# Patient Record
Sex: FEMALE | Race: WHITE | Employment: FULL TIME | URBAN - METROPOLITAN AREA
[De-identification: names, ages, dates, MRNs, and addresses within clinical notes are randomized per-mention and may not be internally consistent; named-entity substitution may affect disease eponyms.]

---

## 2017-01-11 ENCOUNTER — OFFICE VISIT (OUTPATIENT)
Dept: FAMILY MEDICINE CLINIC | Age: 62
End: 2017-01-11

## 2017-01-11 VITALS
DIASTOLIC BLOOD PRESSURE: 86 MMHG | BODY MASS INDEX: 44.72 KG/M2 | RESPIRATION RATE: 12 BRPM | WEIGHT: 243 LBS | SYSTOLIC BLOOD PRESSURE: 138 MMHG | HEIGHT: 62 IN | HEART RATE: 58 BPM | TEMPERATURE: 97.4 F

## 2017-01-11 DIAGNOSIS — F41.9 ANXIETY: ICD-10-CM

## 2017-01-11 DIAGNOSIS — J01.00 ACUTE NON-RECURRENT MAXILLARY SINUSITIS: ICD-10-CM

## 2017-01-11 DIAGNOSIS — B35.1 ONYCHOMYCOSIS OF RIGHT GREAT TOE: ICD-10-CM

## 2017-01-11 DIAGNOSIS — I10 BENIGN ESSENTIAL HTN: Primary | ICD-10-CM

## 2017-01-11 DIAGNOSIS — E78.5 DYSLIPIDEMIA: ICD-10-CM

## 2017-01-11 PROCEDURE — 99213 OFFICE O/P EST LOW 20 MIN: CPT | Performed by: FAMILY MEDICINE

## 2017-01-11 RX ORDER — METOPROLOL SUCCINATE 50 MG/1
50 TABLET, EXTENDED RELEASE ORAL DAILY
Qty: 90 TABLET | Refills: 3 | Status: SHIPPED | OUTPATIENT
Start: 2017-01-11 | End: 2017-06-23 | Stop reason: SDUPTHER

## 2017-01-11 RX ORDER — AMOXICILLIN AND CLAVULANATE POTASSIUM 875; 125 MG/1; MG/1
1 TABLET, FILM COATED ORAL 2 TIMES DAILY
Qty: 20 TABLET | Refills: 0 | Status: SHIPPED | OUTPATIENT
Start: 2017-01-11 | End: 2017-01-21

## 2017-01-11 RX ORDER — TERBINAFINE HYDROCHLORIDE 250 MG/1
250 TABLET ORAL DAILY
Qty: 90 TABLET | Refills: 3 | Status: CANCELLED | OUTPATIENT
Start: 2017-01-11

## 2017-01-11 RX ORDER — SIMVASTATIN 10 MG
10 TABLET ORAL NIGHTLY
Qty: 90 TABLET | Refills: 3 | Status: SHIPPED | OUTPATIENT
Start: 2017-01-11 | End: 2017-06-23 | Stop reason: SDUPTHER

## 2017-01-11 RX ORDER — ALPRAZOLAM 0.5 MG/1
TABLET ORAL
Qty: 30 TABLET | Refills: 1 | Status: CANCELLED | OUTPATIENT
Start: 2017-01-11

## 2017-01-15 ASSESSMENT — ENCOUNTER SYMPTOMS
ABDOMINAL PAIN: 0
COUGH: 0
RHINORRHEA: 1
SHORTNESS OF BREATH: 0
SINUS PRESSURE: 1
BLOOD IN STOOL: 0
WHEEZING: 0

## 2017-06-23 ENCOUNTER — OFFICE VISIT (OUTPATIENT)
Dept: FAMILY MEDICINE CLINIC | Age: 62
End: 2017-06-23

## 2017-06-23 VITALS
HEART RATE: 61 BPM | WEIGHT: 234 LBS | TEMPERATURE: 97.7 F | HEIGHT: 62 IN | SYSTOLIC BLOOD PRESSURE: 132 MMHG | BODY MASS INDEX: 43.06 KG/M2 | RESPIRATION RATE: 14 BRPM | DIASTOLIC BLOOD PRESSURE: 86 MMHG

## 2017-06-23 DIAGNOSIS — E55.9 HYPOVITAMINOSIS D: ICD-10-CM

## 2017-06-23 DIAGNOSIS — E78.5 DYSLIPIDEMIA: ICD-10-CM

## 2017-06-23 DIAGNOSIS — F41.9 ANXIETY: ICD-10-CM

## 2017-06-23 DIAGNOSIS — I10 BENIGN ESSENTIAL HTN: Primary | ICD-10-CM

## 2017-06-23 DIAGNOSIS — Z23 NEED FOR PNEUMOCOCCAL VACCINATION: ICD-10-CM

## 2017-06-23 PROCEDURE — 99213 OFFICE O/P EST LOW 20 MIN: CPT | Performed by: FAMILY MEDICINE

## 2017-06-23 RX ORDER — METOPROLOL SUCCINATE 50 MG/1
50 TABLET, EXTENDED RELEASE ORAL DAILY
Qty: 90 TABLET | Refills: 3 | Status: SHIPPED | OUTPATIENT
Start: 2017-06-23 | End: 2018-05-21 | Stop reason: SDUPTHER

## 2017-06-23 RX ORDER — ERGOCALCIFEROL 1.25 MG/1
50000 CAPSULE ORAL WEEKLY
Qty: 4 CAPSULE | Refills: 5 | Status: SHIPPED | OUTPATIENT
Start: 2017-06-23 | End: 2018-07-02 | Stop reason: SDUPTHER

## 2017-06-23 RX ORDER — SIMVASTATIN 10 MG
10 TABLET ORAL NIGHTLY
Qty: 90 TABLET | Refills: 3 | Status: SHIPPED | OUTPATIENT
Start: 2017-06-23 | End: 2017-12-03 | Stop reason: DRUGHIGH

## 2017-06-23 RX ORDER — ALPRAZOLAM 0.5 MG/1
TABLET ORAL
Qty: 30 TABLET | Refills: 1 | Status: SHIPPED | OUTPATIENT
Start: 2017-06-23 | End: 2017-12-21 | Stop reason: SDUPTHER

## 2017-06-23 ASSESSMENT — PATIENT HEALTH QUESTIONNAIRE - PHQ9
SUM OF ALL RESPONSES TO PHQ QUESTIONS 1-9: 0
2. FEELING DOWN, DEPRESSED OR HOPELESS: 0
1. LITTLE INTEREST OR PLEASURE IN DOING THINGS: 0
SUM OF ALL RESPONSES TO PHQ9 QUESTIONS 1 & 2: 0

## 2017-07-01 ASSESSMENT — ENCOUNTER SYMPTOMS
ABDOMINAL PAIN: 0
SHORTNESS OF BREATH: 0
BLOOD IN STOOL: 0

## 2017-10-10 ENCOUNTER — TELEPHONE (OUTPATIENT)
Dept: FAMILY MEDICINE CLINIC | Age: 62
End: 2017-10-10

## 2017-10-16 ENCOUNTER — TELEPHONE (OUTPATIENT)
Dept: FAMILY MEDICINE CLINIC | Age: 62
End: 2017-10-16

## 2017-10-16 NOTE — TELEPHONE ENCOUNTER
follow up from my phone call 10-10-17 right knee pain , how is patient doing? I spoke with patient and she is still having some pain, comes and goes. My last conversation with her she was going to try to move her appointment up with  due to the discomfort but states today that they did not have an earlier appointment and that she could go to the walk in clinic but probably would not see . Her appointment is 10-26-17 but I did advise her if needed perhaps I could call and get an earlier appointment. She said at this time no need . I will contact patient 10-26-17 to check on her status.

## 2017-10-30 ENCOUNTER — TELEPHONE (OUTPATIENT)
Dept: FAMILY MEDICINE CLINIC | Age: 62
End: 2017-10-30

## 2017-10-30 NOTE — TELEPHONE ENCOUNTER
follow up phone call from consult 10-26-17 right knee , how is patient doing? I spoke with patient and she is waiting for her insurance to authorize gel injection for her knee. This injection will be given in the interim ,patient may be looking at surgery. Patient will contact me with any questions or concerns.

## 2017-12-01 ENCOUNTER — HOSPITAL ENCOUNTER (OUTPATIENT)
Dept: MRI IMAGING | Age: 62
Discharge: HOME | End: 2017-12-01
Payer: COMMERCIAL

## 2017-12-01 ENCOUNTER — OFFICE VISIT (OUTPATIENT)
Dept: FAMILY MEDICINE CLINIC | Age: 62
End: 2017-12-01

## 2017-12-01 VITALS
WEIGHT: 244 LBS | HEART RATE: 74 BPM | RESPIRATION RATE: 18 BRPM | SYSTOLIC BLOOD PRESSURE: 136 MMHG | TEMPERATURE: 97.4 F | DIASTOLIC BLOOD PRESSURE: 86 MMHG | BODY MASS INDEX: 44.63 KG/M2

## 2017-12-01 DIAGNOSIS — Z00.00 HEALTHCARE MAINTENANCE: ICD-10-CM

## 2017-12-01 DIAGNOSIS — F41.9 ANXIETY: ICD-10-CM

## 2017-12-01 DIAGNOSIS — R51.9 INTRACTABLE EPISODIC HEADACHE, UNSPECIFIED HEADACHE TYPE: ICD-10-CM

## 2017-12-01 DIAGNOSIS — R51.9 INTRACTABLE EPISODIC HEADACHE, UNSPECIFIED HEADACHE TYPE: Primary | ICD-10-CM

## 2017-12-01 LAB
ALBUMIN SERPL-MCNC: 4.8 G/DL (ref 3.9–4.9)
ALP BLD-CCNC: 102 U/L (ref 40–130)
ALT SERPL-CCNC: 18 U/L (ref 0–33)
ANION GAP SERPL CALCULATED.3IONS-SCNC: 17 MEQ/L (ref 7–13)
AST SERPL-CCNC: 18 U/L (ref 0–35)
BASOPHILS ABSOLUTE: 0.1 K/UL (ref 0–0.2)
BASOPHILS RELATIVE PERCENT: 1 %
BILIRUB SERPL-MCNC: 0.3 MG/DL (ref 0–1.2)
BUN BLDV-MCNC: 22 MG/DL (ref 8–23)
C-REACTIVE PROTEIN: 4.9 MG/L (ref 0–5)
CALCIUM SERPL-MCNC: 10 MG/DL (ref 8.6–10.2)
CHLORIDE BLD-SCNC: 102 MEQ/L (ref 98–107)
CHOLESTEROL, TOTAL: 221 MG/DL (ref 0–199)
CO2: 27 MEQ/L (ref 22–29)
CREAT SERPL-MCNC: 0.94 MG/DL (ref 0.5–0.9)
EOSINOPHILS ABSOLUTE: 0.4 K/UL (ref 0–0.7)
EOSINOPHILS RELATIVE PERCENT: 6.3 %
GFR AFRICAN AMERICAN: >60
GFR NON-AFRICAN AMERICAN: >60
GLOBULIN: 3 G/DL (ref 2.3–3.5)
GLUCOSE BLD-MCNC: 89 MG/DL (ref 74–109)
HBA1C MFR BLD: 6.4 %
HCT VFR BLD CALC: 41.7 % (ref 37–47)
HDLC SERPL-MCNC: 71 MG/DL (ref 40–59)
HEMOGLOBIN: 14.3 G/DL (ref 12–16)
LDL CHOLESTEROL CALCULATED: 122 MG/DL (ref 0–129)
LYMPHOCYTES ABSOLUTE: 3 K/UL (ref 1–4.8)
LYMPHOCYTES RELATIVE PERCENT: 46.1 %
MCH RBC QN AUTO: 32.7 PG (ref 27–31.3)
MCHC RBC AUTO-ENTMCNC: 34.4 % (ref 33–37)
MCV RBC AUTO: 95 FL (ref 82–100)
MONOCYTES ABSOLUTE: 0.4 K/UL (ref 0.2–0.8)
MONOCYTES RELATIVE PERCENT: 5.8 %
NEUTROPHILS ABSOLUTE: 2.7 K/UL (ref 1.4–6.5)
NEUTROPHILS RELATIVE PERCENT: 40.8 %
PDW BLD-RTO: 13.4 % (ref 11.5–14.5)
PLATELET # BLD: 309 K/UL (ref 130–400)
POTASSIUM SERPL-SCNC: 4.8 MEQ/L (ref 3.5–5.1)
RBC # BLD: 4.39 M/UL (ref 4.2–5.4)
SEDIMENTATION RATE, ERYTHROCYTE: 21 MM (ref 0–30)
SODIUM BLD-SCNC: 146 MEQ/L (ref 132–144)
TOTAL PROTEIN: 7.8 G/DL (ref 6.4–8.1)
TRIGL SERPL-MCNC: 141 MG/DL (ref 0–200)
TSH SERPL DL<=0.05 MIU/L-ACNC: 1.15 UIU/ML (ref 0.27–4.2)
WBC # BLD: 6.6 K/UL (ref 4.8–10.8)

## 2017-12-01 PROCEDURE — 70553 MRI BRAIN STEM W/O & W/DYE: CPT

## 2017-12-01 PROCEDURE — 90688 IIV4 VACCINE SPLT 0.5 ML IM: CPT | Performed by: INTERNAL MEDICINE

## 2017-12-01 PROCEDURE — 99214 OFFICE O/P EST MOD 30 MIN: CPT | Performed by: INTERNAL MEDICINE

## 2017-12-01 PROCEDURE — 6360000004 HC RX CONTRAST MEDICATION: Performed by: INTERNAL MEDICINE

## 2017-12-01 PROCEDURE — 83036 HEMOGLOBIN GLYCOSYLATED A1C: CPT | Performed by: INTERNAL MEDICINE

## 2017-12-01 PROCEDURE — A9579 GAD-BASE MR CONTRAST NOS,1ML: HCPCS | Performed by: INTERNAL MEDICINE

## 2017-12-01 PROCEDURE — 90471 IMMUNIZATION ADMIN: CPT | Performed by: INTERNAL MEDICINE

## 2017-12-01 RX ORDER — LORAZEPAM 0.5 MG/1
1 TABLET ORAL ONCE
Qty: 2 TABLET | Refills: 0 | Status: SHIPPED | OUTPATIENT
Start: 2017-12-01 | End: 2017-12-01

## 2017-12-01 RX ADMIN — GADOTERIDOL 20 ML: 279.3 INJECTION, SOLUTION INTRAVENOUS at 16:12

## 2017-12-01 ASSESSMENT — ENCOUNTER SYMPTOMS
DIARRHEA: 0
COLOR CHANGE: 0
EYE DISCHARGE: 0
EYE REDNESS: 0
ABDOMINAL PAIN: 0
BACK PAIN: 0
SHORTNESS OF BREATH: 0
EYE PAIN: 0
PHOTOPHOBIA: 0
WHEEZING: 0
TROUBLE SWALLOWING: 0
VOICE CHANGE: 0
EYE ITCHING: 0
SORE THROAT: 0
ABDOMINAL DISTENTION: 0
BLOOD IN STOOL: 0
COUGH: 0
CONSTIPATION: 0
NAUSEA: 0

## 2017-12-01 NOTE — PROGRESS NOTES
Subjective:      Patient ID: Cassandra Fields is a 60 y.o. female    Headache and elevated BP x 4 months    HPI    Patient presents with a sharp headache at the top center part of the head on both sides. Pain is rated 8/10 and non radiating. This has been on since July 2017, intermittent and associated with high BP readings (which never occurred without headache). personal stress issues. Readings around 200/99 average. No neck pain or stiffness. No fever or chills. Patient attests to associated scalp tenderness. No thigh/arm weakness/pain. Assoc. fatigue, blurry vision with headache, ringling in the ears, and head pulsation when she bent over. Pain is also behind the eyes, no eye redness or tearing. No nasal congestion. 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     BREAST SURGERY  2004    EXCISION BENIGN RIGHT BREAST LESION     HYSTERECTOMY      TUBAL LIGATION       Social History     Social History    Marital status:      Spouse name: N/A    Number of children: N/A    Years of education: N/A     Occupational History    Not on file. Social History Main Topics    Smoking status: Never Smoker    Smokeless tobacco: Never Used    Alcohol use No      Comment: never    Drug use: Unknown    Sexual activity: Not on file     Other Topics Concern    Not on file     Social History Narrative    No narrative on file     Family History   Problem Relation Age of Onset    Other Mother      PULMONARY DX    Heart Disease Father     Heart Disease Brother      Allergies:  Review of patient's allergies indicates no known allergies.   Patient Active Problem List Psychiatric/Behavioral: Negative for agitation, decreased concentration, dysphoric mood and hallucinations. The patient is not nervous/anxious. Objective:   /86   Pulse 74   Temp 97.4 °F (36.3 °C) (Temporal)   Resp 18   Wt 244 lb (110.7 kg)   BMI 44.63 kg/m²     Physical Exam   Constitutional: She is oriented to person, place, and time. She appears well-developed and well-nourished. No distress. HENT:   Head: Normocephalic and atraumatic. Right Ear: External ear normal.   Left Ear: External ear normal.   Mouth/Throat: Oropharynx is clear and moist. No oropharyngeal exudate. Generalized scalp TTP   Eyes: Right eye exhibits no discharge. Left eye exhibits no discharge. Right conjunctiva is not injected. Right conjunctiva has no hemorrhage. Left conjunctiva is not injected. Left conjunctiva has no hemorrhage. Not dilated fundoscopy attempted but papilledema not confirmed   Cardiovascular: Normal rate, regular rhythm, S1 normal, S2 normal and normal heart sounds. Pulmonary/Chest: Effort normal and breath sounds normal. No accessory muscle usage. No tachypnea. No respiratory distress. She has no wheezes. She has no rales. She exhibits no tenderness. Abdominal: Soft. Bowel sounds are normal. There is no tenderness. Musculoskeletal: Normal range of motion. She exhibits no edema. Lymphadenopathy:     She has no cervical adenopathy. Neurological: She is alert and oriented to person, place, and time. She displays normal reflexes. No cranial nerve deficit. She exhibits normal muscle tone. Coordination normal.   Skin: She is not diaphoretic. Assessment:      1. Intractable episodic headache, unspecified headache type  MRI BRAIN W WO CONTRAST    Sedimentation Rate    C-Reactive Protein   2.  Healthcare maintenance  INFLUENZA, QUADV, 3 YRS AND OLDER, IM, MDV, 0.5ML (FLUZONE QUADV)    Ambulatory referral to Gastroenterology    CBC Auto Differential    Comprehensive Metabolic Panel Lipid Panel    TSH Without Reflex    POCT glycosylated hemoglobin (Hb A1C)         Plan:      Orders Placed This Encounter   Procedures    MRI BRAIN W WO CONTRAST     Standing Status:   Future     Standing Expiration Date:   12/1/2018     Order Specific Question:   Reason for exam:     Answer:   [email or web address]    INFLUENZA, QUADV, 3 YRS AND OLDER, IM, MDV, 0.5ML (FLUZONE QUADV)    CBC Auto Differential     Standing Status:   Future     Standing Expiration Date:   12/1/2018    Comprehensive Metabolic Panel     Standing Status:   Future     Standing Expiration Date:   12/1/2018    Lipid Panel     Standing Status:   Future     Standing Expiration Date:   12/1/2018     Order Specific Question:   Is Patient Fasting?/# of Hours     Answer:   YES    TSH Without Reflex     Standing Status:   Future     Standing Expiration Date:   12/1/2018    Sedimentation Rate     Standing Status:   Future     Standing Expiration Date:   12/1/2018    C-Reactive Protein     Standing Status:   Future     Standing Expiration Date:   12/1/2018    Ambulatory referral to Gastroenterology     Referral Priority:   Routine     Referral Type:   Consult for Advice and Opinion     Referral Reason:   Specialty Services Required     Referred to Provider:   [id number] Carol Sebastian MD     Requested Specialty:   Gastroenterology     Number of Visits Requested:   1    POCT glycosylated hemoglobin (Hb A1C)     Results for POC orders placed in visit on 12/01/17   POCT glycosylated hemoglobin (Hb A1C)   Result Value Ref Range    Hemoglobin A1C 6.4 %       No orders of the defined types were placed in this encounter. Return if symptoms worsen or fail to improve.

## 2017-12-03 DIAGNOSIS — E78.00 HYPERCHOLESTEROLEMIA: ICD-10-CM

## 2017-12-03 DIAGNOSIS — E78.5 DYSLIPIDEMIA: ICD-10-CM

## 2017-12-03 DIAGNOSIS — R51.9 INTRACTABLE HEADACHE, UNSPECIFIED CHRONICITY PATTERN, UNSPECIFIED HEADACHE TYPE: Primary | ICD-10-CM

## 2017-12-03 RX ORDER — SIMVASTATIN 40 MG
40 TABLET ORAL EVERY EVENING
Qty: 90 TABLET | Refills: 3 | Status: SHIPPED | OUTPATIENT
Start: 2017-12-03 | End: 2018-07-02 | Stop reason: SDUPTHER

## 2017-12-14 ENCOUNTER — TELEPHONE (OUTPATIENT)
Dept: FAMILY MEDICINE CLINIC | Age: 62
End: 2017-12-14

## 2017-12-29 ENCOUNTER — OFFICE VISIT (OUTPATIENT)
Dept: FAMILY MEDICINE CLINIC | Age: 62
End: 2017-12-29

## 2017-12-29 VITALS
BODY MASS INDEX: 41.59 KG/M2 | HEART RATE: 59 BPM | WEIGHT: 226 LBS | DIASTOLIC BLOOD PRESSURE: 82 MMHG | TEMPERATURE: 97.3 F | SYSTOLIC BLOOD PRESSURE: 118 MMHG | HEIGHT: 62 IN | RESPIRATION RATE: 16 BRPM

## 2017-12-29 DIAGNOSIS — I10 BENIGN ESSENTIAL HTN: ICD-10-CM

## 2017-12-29 DIAGNOSIS — E55.9 HYPOVITAMINOSIS D: ICD-10-CM

## 2017-12-29 DIAGNOSIS — J40 TRACHEOBRONCHITIS: Primary | ICD-10-CM

## 2017-12-29 DIAGNOSIS — E78.00 ELEVATED CHOLESTEROL: ICD-10-CM

## 2017-12-29 PROCEDURE — 99213 OFFICE O/P EST LOW 20 MIN: CPT | Performed by: FAMILY MEDICINE

## 2017-12-29 RX ORDER — PREDNISONE 20 MG/1
TABLET ORAL
Qty: 6 TABLET | Refills: 0 | Status: SHIPPED | OUTPATIENT
Start: 2017-12-29 | End: 2018-03-30 | Stop reason: ALTCHOICE

## 2017-12-29 RX ORDER — DEXTROMETHORPHAN HYDROBROMIDE AND PROMETHAZINE HYDROCHLORIDE 15; 6.25 MG/5ML; MG/5ML
5 SYRUP ORAL 4 TIMES DAILY PRN
Qty: 118 ML | Refills: 0 | Status: SHIPPED | OUTPATIENT
Start: 2017-12-29 | End: 2018-01-07

## 2017-12-29 RX ORDER — AZITHROMYCIN 250 MG/1
TABLET, FILM COATED ORAL
Qty: 6 TABLET | Refills: 0 | Status: SHIPPED | OUTPATIENT
Start: 2017-12-29 | End: 2018-01-08

## 2017-12-29 NOTE — PROGRESS NOTES
Social History    Marital status:      Spouse name: N/A    Number of children: N/A    Years of education: N/A     Occupational History    Not on file. Social History Main Topics    Smoking status: Never Smoker    Smokeless tobacco: Never Used    Alcohol use No      Comment: never    Drug use: Unknown    Sexual activity: Not on file     Other Topics Concern    Not on file     Social History Narrative    No narrative on file     Family History   Problem Relation Age of Onset    Other Mother      PULMONARY DX    Heart Disease Father     Heart Disease Brother           Current Outpatient Prescriptions on File Prior to Visit   Medication Sig Dispense Refill    ALPRAZolam (XANAX) 0.5 MG tablet TAKE ONE-HALF TABLET BY MOUTH TWICE DAILY AS NEEDED. 30 tablet 1    simvastatin (ZOCOR) 40 MG tablet Take 1 tablet by mouth every evening 90 tablet 3    metoprolol succinate (TOPROL XL) 50 MG extended release tablet Take 1 tablet by mouth daily 90 tablet 3    vitamin D (ERGOCALCIFEROL) 39403 units CAPS capsule Take 1 capsule by mouth once a week 4 capsule 5    aspirin 81 MG tablet Take 81 mg by mouth daily      raloxifene (EVISTA) 60 MG tablet Take 60 mg by mouth daily. No current facility-administered medications on file prior to visit. Objective    Vitals:    12/29/17 0754   BP: 118/82   Pulse: 59   Resp: 16   Temp: 97.3 °F (36.3 °C)   TempSrc: Temporal   Weight: 226 lb (102.5 kg)   Height: 5' 2\" (1.575 m)   ;   Orders Only on 12/01/2017   Component Date Value Ref Range Status    WBC 12/01/2017 6.6  4.8 - 10.8 K/uL Final    RBC 12/01/2017 4.39  4.20 - 5.40 M/uL Final    Hemoglobin 12/01/2017 14.3  12.0 - 16.0 g/dL Final    Hematocrit 12/01/2017 41.7  37.0 - 47.0 % Final    MCV 12/01/2017 95.0  82.0 - 100.0 fL Final    MCH 12/01/2017 32.7* 27.0 - 31.3 pg Final    MCHC 12/01/2017 34.4  33.0 - 37.0 % Final    RDW 12/01/2017 13.4  11.5 - 14.5 % Final    Platelets 78/20/0797 309 are normal. Pupils are equal, round, and reactive to light. No scleral icterus. Fundoscopic exam:       The right eye shows no papilledema. The left eye shows no papilledema. Neck: Normal range of motion. Neck supple. Carotid bruit is not present. No neck rigidity. No thyroid mass and no thyromegaly present. Cardiovascular: Normal rate, regular rhythm, S1 normal, S2 normal and normal heart sounds. No extrasystoles are present. No murmur heard. Pulmonary/Chest: Effort normal. No respiratory distress. She has wheezes in the right upper field. She has rhonchi in the right upper field, the left upper field and the left middle field. She has no rales. Musculoskeletal: She exhibits no edema. Neurological: She is alert and oriented to person, place, and time. She has normal sensation, normal strength, normal reflexes and intact cranial nerves. Gait normal.   Skin: She is not diaphoretic. Psychiatric: Mood, memory and affect normal.   ;Normal sensory/vascular foot exam and no lesions bilaterally    ; Assessment & Plan   1. Tracheobronchitis  promethazine-dextromethorphan (PROMETHAZINE-DM) 6.25-15 MG/5ML syrup    azithromycin (ZITHROMAX) 250 MG tablet    predniSONE (DELTASONE) 20 MG tablet   2. Benign essential HTN     3. Hypovitaminosis D     4. Elevated cholesterol     ;See above orders, as use and side effects reviewed ; The patient is asked to make an attempt to improve diet and exercise patterns to aid in medical management of this problem. Compliance rev  Benefit/xanax>>risk  ; Outpatient Encounter Prescriptions as of 12/29/2017   Medication Sig Dispense Refill    ALPRAZolam (XANAX) 0.5 MG tablet TAKE ONE-HALF TABLET BY MOUTH TWICE DAILY AS NEEDED.  30 tablet 1    simvastatin (ZOCOR) 40 MG tablet Take 1 tablet by mouth every evening 90 tablet 3    metoprolol succinate (TOPROL XL) 50 MG extended release tablet Take 1 tablet by mouth daily 90 tablet 3    vitamin D (ERGOCALCIFEROL) 91547 units

## 2018-01-07 PROBLEM — E55.9 HYPOVITAMINOSIS D: Status: ACTIVE | Noted: 2017-01-01

## 2018-01-07 PROBLEM — I10 BENIGN ESSENTIAL HTN: Status: ACTIVE | Noted: 2018-01-07

## 2018-01-07 ASSESSMENT — ENCOUNTER SYMPTOMS
WHEEZING: 1
COUGH: 1
SORE THROAT: 1
BACK PAIN: 1
ABDOMINAL PAIN: 0
SHORTNESS OF BREATH: 0
ANAL BLEEDING: 0
SINUS PRESSURE: 1
VOICE CHANGE: 1

## 2018-02-01 ENCOUNTER — TELEPHONE (OUTPATIENT)
Dept: FAMILY MEDICINE CLINIC | Age: 63
End: 2018-02-01

## 2018-03-30 ENCOUNTER — OFFICE VISIT (OUTPATIENT)
Dept: FAMILY MEDICINE CLINIC | Age: 63
End: 2018-03-30
Payer: COMMERCIAL

## 2018-03-30 VITALS
RESPIRATION RATE: 16 BRPM | TEMPERATURE: 97.2 F | HEART RATE: 72 BPM | SYSTOLIC BLOOD PRESSURE: 128 MMHG | BODY MASS INDEX: 45.45 KG/M2 | HEIGHT: 62 IN | DIASTOLIC BLOOD PRESSURE: 82 MMHG | WEIGHT: 247 LBS

## 2018-03-30 DIAGNOSIS — B96.89 ACUTE BACTERIAL SINUSITIS: ICD-10-CM

## 2018-03-30 DIAGNOSIS — E55.9 HYPOVITAMINOSIS D: ICD-10-CM

## 2018-03-30 DIAGNOSIS — J01.90 ACUTE BACTERIAL SINUSITIS: ICD-10-CM

## 2018-03-30 DIAGNOSIS — E78.00 ELEVATED CHOLESTEROL: ICD-10-CM

## 2018-03-30 DIAGNOSIS — I10 BENIGN ESSENTIAL HTN: Primary | ICD-10-CM

## 2018-03-30 PROCEDURE — 99213 OFFICE O/P EST LOW 20 MIN: CPT | Performed by: FAMILY MEDICINE

## 2018-03-30 RX ORDER — AMOXICILLIN AND CLAVULANATE POTASSIUM 875; 125 MG/1; MG/1
1 TABLET, FILM COATED ORAL 2 TIMES DAILY
Qty: 20 TABLET | Refills: 0 | Status: SHIPPED | OUTPATIENT
Start: 2018-03-30 | End: 2018-04-09

## 2018-03-30 RX ORDER — IBUPROFEN 800 MG/1
TABLET ORAL
Refills: 1 | COMMUNITY
Start: 2018-03-08 | End: 2019-02-05 | Stop reason: CLARIF

## 2018-03-30 RX ORDER — TOPIRAMATE 100 MG/1
TABLET, FILM COATED ORAL
Refills: 3 | COMMUNITY
Start: 2018-02-08

## 2018-04-09 ASSESSMENT — ENCOUNTER SYMPTOMS
WHEEZING: 0
SORE THROAT: 0
TROUBLE SWALLOWING: 0
RHINORRHEA: 1
SINUS PRESSURE: 1
BLOOD IN STOOL: 0
SINUS PAIN: 1
ABDOMINAL PAIN: 0
VOICE CHANGE: 0
COUGH: 1
STRIDOR: 0

## 2018-05-21 DIAGNOSIS — I10 BENIGN ESSENTIAL HTN: ICD-10-CM

## 2018-05-21 RX ORDER — METOPROLOL SUCCINATE 50 MG/1
TABLET, EXTENDED RELEASE ORAL
Qty: 90 TABLET | Refills: 3 | Status: SHIPPED | OUTPATIENT
Start: 2018-05-21 | End: 2018-07-02 | Stop reason: SDUPTHER

## 2018-07-02 ENCOUNTER — OFFICE VISIT (OUTPATIENT)
Dept: FAMILY MEDICINE CLINIC | Age: 63
End: 2018-07-02
Payer: COMMERCIAL

## 2018-07-02 VITALS
TEMPERATURE: 96.8 F | HEIGHT: 62 IN | SYSTOLIC BLOOD PRESSURE: 122 MMHG | DIASTOLIC BLOOD PRESSURE: 86 MMHG | BODY MASS INDEX: 43.06 KG/M2 | HEART RATE: 65 BPM | RESPIRATION RATE: 16 BRPM | WEIGHT: 234 LBS

## 2018-07-02 DIAGNOSIS — E78.5 DYSLIPIDEMIA: ICD-10-CM

## 2018-07-02 DIAGNOSIS — Z12.11 SCREENING FOR MALIGNANT NEOPLASM OF COLON: ICD-10-CM

## 2018-07-02 DIAGNOSIS — E55.9 HYPOVITAMINOSIS D: ICD-10-CM

## 2018-07-02 DIAGNOSIS — F41.9 ANXIETY: ICD-10-CM

## 2018-07-02 DIAGNOSIS — I10 BENIGN ESSENTIAL HTN: Primary | ICD-10-CM

## 2018-07-02 DIAGNOSIS — M17.11 PRIMARY LOCALIZED OSTEOARTHRITIS OF RIGHT KNEE: ICD-10-CM

## 2018-07-02 PROCEDURE — 99213 OFFICE O/P EST LOW 20 MIN: CPT | Performed by: FAMILY MEDICINE

## 2018-07-02 RX ORDER — ERGOCALCIFEROL 1.25 MG/1
50000 CAPSULE ORAL WEEKLY
Qty: 4 CAPSULE | Refills: 5 | Status: SHIPPED | OUTPATIENT
Start: 2018-07-02 | End: 2018-11-19 | Stop reason: SDUPTHER

## 2018-07-02 RX ORDER — METOPROLOL SUCCINATE 50 MG/1
TABLET, EXTENDED RELEASE ORAL
Qty: 90 TABLET | Refills: 3 | Status: SHIPPED | OUTPATIENT
Start: 2018-07-02

## 2018-07-02 RX ORDER — SIMVASTATIN 40 MG
40 TABLET ORAL EVERY EVENING
Qty: 90 TABLET | Refills: 3 | Status: SHIPPED | OUTPATIENT
Start: 2018-07-02

## 2018-07-02 RX ORDER — ALPRAZOLAM 0.5 MG/1
TABLET ORAL
Qty: 30 TABLET | Refills: 1 | Status: SHIPPED | OUTPATIENT
Start: 2018-07-02 | End: 2018-10-02 | Stop reason: SDUPTHER

## 2018-07-02 RX ORDER — MELOXICAM 7.5 MG/1
7.5 TABLET ORAL 2 TIMES DAILY PRN
Qty: 180 TABLET | Refills: 3 | Status: SHIPPED | OUTPATIENT
Start: 2018-07-02 | End: 2019-02-05 | Stop reason: SDUPTHER

## 2018-07-02 ASSESSMENT — PATIENT HEALTH QUESTIONNAIRE - PHQ9
2. FEELING DOWN, DEPRESSED OR HOPELESS: 0
SUM OF ALL RESPONSES TO PHQ9 QUESTIONS 1 & 2: 0
1. LITTLE INTEREST OR PLEASURE IN DOING THINGS: 0
SUM OF ALL RESPONSES TO PHQ QUESTIONS 1-9: 0

## 2018-07-02 NOTE — LETTER
risks may increase when these medications are combined with other stimulants, such as caffeine pills or energy drinks, certain weight loss supplements and oral decongestants. Dependence withdrawal symptoms may include depressed mood, loss of interest, suicidal thoughts, anxiety, fatigue, appetite changes and agitation. Testosterone replacement therapy:  Potential side effects include increased risk of stroke and heart attack, blood clots, increased blood pressure, increased cholesterol, enlarged prostate, sleep apnea, irritability/aggression and other mood disorders, and decreased fertility. Other:     1. I understand that I have the following responsibilities:  · I will take medications at the dose and frequency prescribed. · I will not increase or change how I take my medications without the approval of the health care provider who signs this Medication Agreement. · I will arrange for refills at the prescribed interval ONLY during regular office hours. I will not ask for refills earlier than agreed, after-hours, on holidays or on weekends. · I will obtain all refills for these medications at  · [address], [address] [facility]  ([phone]), with full consent for my provider and pharmacist to exchange information in writing or verbally. · I will not request any pain medications or controlled substances from other providers and will inform this provider of all other medications I am taking. · I will inform my other health care providers that I am taking these medications and of the existence of this [facility] [id number]. In the event of an emergency, I will provide the same information to the emergency department providers. · I will protect my prescriptions and medications. I understand that lost or misplaced prescriptions will not be replaced.   · I will keep medications only for my own use and will not share them with

## 2018-07-02 NOTE — PROGRESS NOTES
Subjective   Dat Tamayo, 58 y.o. female presents today with:  Chief Complaint   Patient presents with    Hypertension     4 mnth f/u        HPI  Patient presents today for a four month follow up for hypertension. Has occ right medial knee pain-and occ nausea a nd now creat rising: agrees to change from motrin to mobic  ; No cardio-pulmonary probs;compliant with diet/rxs;no new gi-gu complaints   Rev/rxs; No abuse nor side effects on meds   Mood stable/xanax;benefit>>risk  Review of Systems   Constitutional: Negative for fatigue. Eyes: Negative for visual disturbance. Respiratory: Negative for shortness of breath. Cardiovascular: Negative for chest pain and leg swelling. Gastrointestinal: Negative for abdominal pain and blood in stool. Genitourinary: Negative for enuresis, flank pain and frequency. Musculoskeletal: Positive for arthralgias and myalgias. Negative for back pain. Neurological: Negative for weakness, light-headedness and headaches. Psychiatric/Behavioral: The patient is nervous/anxious (stable/rx). 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     BREAST SURGERY  2004    EXCISION BENIGN RIGHT BREAST LESION     HYSTERECTOMY      TUBAL LIGATION       Social History     Social History    Marital status:      Spouse name: N/A    Number of children: N/A    Years of education: N/A     Occupational History    Not on file.      Social History Main Topics    Smoking status: Never Smoker    Smokeless tobacco: Never Used    Alcohol use No      Comment: never   Ana Laura Delcid Drug use: Unknown    Sexual activity: Not on file     Other Topics Concern    Not on file     Social History Narrative    No narrative on file     Family History   Problem Relation Age of Onset    Other Mother         PULMONARY DX    Heart Disease Father     Heart Disease Brother           Current Outpatient Prescriptions on File Prior to Visit   Medication Sig Dispense Refill    topiramate (TOPAMAX) 100 MG tablet TK ONE PO AT BEDTIME  3    ibuprofen (ADVIL;MOTRIN) 800 MG tablet   1    aspirin 81 MG tablet Take 81 mg by mouth daily       No current facility-administered medications on file prior to visit. Objective    Vitals:    07/02/18 0756   BP: 122/86   Site: Left Arm   Position: Sitting   Cuff Size: Large Adult   Pulse: 65   Resp: 16   Temp: 96.8 °F (36 °C)   TempSrc: Temporal   Weight: 234 lb (106.1 kg)   Height: 5' 2\" (1.575 m)     Physical Exam   Constitutional: She is well-developed, well-nourished, and in no distress. No distress. Eyes: No scleral icterus. Neck: Normal range of motion. Neck supple. Carotid bruit is not present. No neck rigidity. No thyroid mass and no thyromegaly present. Cardiovascular: Normal rate, regular rhythm, S1 normal, S2 normal and normal heart sounds. No extrasystoles are present. No murmur heard. Pulmonary/Chest: Effort normal and breath sounds normal.   Musculoskeletal: She exhibits no edema. Right knee: She exhibits bony tenderness. She exhibits normal range of motion, no swelling, no effusion, no erythema and normal meniscus. Tenderness found. Medial joint line (1/4 tx) tenderness noted. Skin: She is not diaphoretic.    Psychiatric: Affect normal.   ;Normal sensory/vascular foot exam and no lesions bilaterally    ;;  Orders Only on 12/01/2017   Component Date Value Ref Range Status    WBC 12/01/2017 6.6  4.8 - 10.8 K/uL Final    RBC 12/01/2017 4.39  4.20 - 5.40 M/uL Final    Hemoglobin 12/01/2017 14.3  12.0 - 16.0 g/dL Final    Hematocrit 12/01/2017 41.7  37.0 - 47.0 % Final    MCV 12/01/2017 95.0  82.0 - 100.0 fL Final    MCH 12/01/2017 32.7* 27.0 - 31.3 pg Final    MCHC 12/01/2017 34.4  33.0 - 37.0 % Final    RDW 12/01/2017 13.4  11.5 - 14.5 % Final    Platelets 89/71/9466 309  130 - 400 K/uL Final    Neutrophils % 12/01/2017 40.8  % Final    Lymphocytes % 12/01/2017 46.1  % Final    Monocytes % 12/01/2017 5.8  % Final    Eosinophils % 12/01/2017 6.3  % Final    Basophils % 12/01/2017 1.0  % Final    Neutrophils # 12/01/2017 2.7  1.4 - 6.5 K/uL Final    Lymphocytes # 12/01/2017 3.0  1.0 - 4.8 K/uL Final    Monocytes # 12/01/2017 0.4  0.2 - 0.8 K/uL Final    Eosinophils # 12/01/2017 0.4  0.0 - 0.7 K/uL Final    Basophils # 12/01/2017 0.1  0.0 - 0.2 K/uL Final    Sodium 12/01/2017 146* 132 - 144 mEq/L Final    Potassium 12/01/2017 4.8  3.5 - 5.1 mEq/L Final    Chloride 12/01/2017 102  98 - 107 mEq/L Final    CO2 12/01/2017 27  22 - 29 mEq/L Final    Anion Gap 12/01/2017 17* 7 - 13 mEq/L Final    Glucose 12/01/2017 89  74 - 109 mg/dL Final    BUN 12/01/2017 22  8 - 23 mg/dL Final    CREATININE 12/01/2017 0.94* 0.50 - 0.90 mg/dL Final    GFR Non- 12/01/2017 >60.0  >60 Final    Comment: >60 mL/min/1.73m2 EGFR, calc. for ages 25 and older using the  MDRD formula (not corrected for weight), is valid for stable  renal function.  GFR  12/01/2017 >60.0  >60 Final    Comment: >60 mL/min/1.73m2 EGFR, calc. for ages 25 and older using the  MDRD formula (not corrected for weight), is valid for stable  renal function.       Calcium 12/01/2017 10.0  8.6 - 10.2 mg/dL Final    Total Protein 12/01/2017 7.8  6.4 - 8.1 g/dL Final    Alb 12/01/2017 4.8  3.9 - 4.9 g/dL Final    Total Bilirubin 12/01/2017 0.3  0.0 - 1.2 mg/dL Final    Alkaline Phosphatase 12/01/2017 102  40 - 130 U/L Final    ALT 12/01/2017 18  0 - 33 U/L Final    AST 12/01/2017 18  0 - 35 U/L Final    Globulin 12/01/2017 3.0  2.3 - 3.5 g/dL Final    Cholesterol, Total 12/01/2017 221* 0 - 199 mg/dL Final    ATP III Cholesterol Classification is Borderline High.  Triglycerides 12/01/2017 141  0 - 200 mg/dL Final    ATP III Triglycerides Classification is Normal.    HDL 12/01/2017 71* 40 - 59 mg/dL Final    Comment: ATP III HDL Cholesterol Classification is high. Expected Values:    Males:    >55 = No Risk            35-55 = Moderate Risk            <35 = High Risk    Females:  >65 = No Risk            45-65 = Moderate Risk            <45 = High Risk    NCEP Guidelines: Third Report May 2001  >59 = negative risk factor for CHD  <40 = major risk factor for CHD      LDL Calculated 12/01/2017 122  0 - 129 mg/dL Final    ATP III LDL Classification is Near Optimal.    TSH 12/01/2017 1.150  0.270 - 4.200 uIU/mL Final    Sed Rate 12/01/2017 21  0 - 30 mm Final    CRP 12/01/2017 4.9  0.0 - 5.0 mg/L Final    lab rev, and dx/rx/plan  Assessment & Plan    Diagnosis Orders   1. Benign essential HTN  metoprolol succinate (TOPROL XL) 50 MG extended release tablet    Basic Metabolic Panel   2. Hypovitaminosis D  vitamin D (ERGOCALCIFEROL) 65332 units CAPS capsule   3. Dyslipidemia  simvastatin (ZOCOR) 40 MG tablet   4. Anxiety  ALPRAZolam (XANAX) 0.5 MG tablet   5. Screening for malignant neoplasm of colon  Ambulatory referral to Gastroenterology   6. Primary localized osteoarthritis of right knee  meloxicam (MOBIC) 7.5 MG tablet   creat rising--go to mobic;;See above orders, as use and side effects reviewed   [facility] ; Outpatient Encounter Prescriptions as of 7/2/2018   Medication Sig Dispense Refill    metoprolol succinate (TOPROL XL) 50 MG extended release tablet TAKE ONE TABLET BY MOUTH ONCE DAILY 90 tablet 3    simvastatin (ZOCOR) 40 MG tablet Take 1 tablet by mouth every evening 90 tablet 3    ALPRAZolam (XANAX) 0.5 MG tablet TAKE ONE-HALF TABLET BY MOUTH TWICE DAILY AS NEEDED.  30 tablet 1    topiramate (TOPAMAX) 100 MG tablet TK ONE PO AT BEDTIME  3    by mouth 2 times daily as needed for Pain     Dispense:  180 tablet     Refill:  3     Medications Discontinued During This Encounter   Medication Reason    vitamin D (ERGOCALCIFEROL) 01836 units CAPS capsule REORDER    metoprolol succinate (TOPROL XL) 50 MG extended release tablet REORDER    simvastatin (ZOCOR) 40 MG tablet REORDER    ALPRAZolam (XANAX) 0.5 MG tablet REORDER     Return in about 3 months (around 10/2/2018) for knee. Call or return to clinic prn if these symptoms worsen or fail to improve as anticipated.       Miguel Atkinson, DO

## 2018-07-08 ASSESSMENT — ENCOUNTER SYMPTOMS
BLOOD IN STOOL: 0
BACK PAIN: 0
ABDOMINAL PAIN: 0
SHORTNESS OF BREATH: 0

## 2018-10-01 DIAGNOSIS — I10 BENIGN ESSENTIAL HTN: ICD-10-CM

## 2018-10-01 LAB
ANION GAP SERPL CALCULATED.3IONS-SCNC: 15 MEQ/L (ref 7–13)
BUN BLDV-MCNC: 19 MG/DL (ref 8–23)
CALCIUM SERPL-MCNC: 9.5 MG/DL (ref 8.6–10.2)
CHLORIDE BLD-SCNC: 106 MEQ/L (ref 98–107)
CO2: 24 MEQ/L (ref 22–29)
CREAT SERPL-MCNC: 0.98 MG/DL (ref 0.5–0.9)
GFR AFRICAN AMERICAN: >60
GFR NON-AFRICAN AMERICAN: 57.1
GLUCOSE BLD-MCNC: 102 MG/DL (ref 74–109)
POTASSIUM SERPL-SCNC: 5 MEQ/L (ref 3.5–5.1)
SODIUM BLD-SCNC: 145 MEQ/L (ref 132–144)

## 2018-10-02 ENCOUNTER — OFFICE VISIT (OUTPATIENT)
Dept: FAMILY MEDICINE CLINIC | Age: 63
End: 2018-10-02
Payer: COMMERCIAL

## 2018-10-02 VITALS
BODY MASS INDEX: 44.35 KG/M2 | HEART RATE: 66 BPM | DIASTOLIC BLOOD PRESSURE: 80 MMHG | RESPIRATION RATE: 16 BRPM | HEIGHT: 62 IN | WEIGHT: 241 LBS | TEMPERATURE: 97.1 F | SYSTOLIC BLOOD PRESSURE: 128 MMHG

## 2018-10-02 DIAGNOSIS — Z23 NEED FOR INFLUENZA VACCINATION: ICD-10-CM

## 2018-10-02 DIAGNOSIS — J01.90 ACUTE BACTERIAL SINUSITIS: ICD-10-CM

## 2018-10-02 DIAGNOSIS — E55.9 HYPOVITAMINOSIS D: ICD-10-CM

## 2018-10-02 DIAGNOSIS — N18.30 STAGE 3 CHRONIC KIDNEY DISEASE (HCC): ICD-10-CM

## 2018-10-02 DIAGNOSIS — R73.01 IFG (IMPAIRED FASTING GLUCOSE): ICD-10-CM

## 2018-10-02 DIAGNOSIS — F41.9 ANXIETY: ICD-10-CM

## 2018-10-02 DIAGNOSIS — B96.89 ACUTE BACTERIAL SINUSITIS: ICD-10-CM

## 2018-10-02 DIAGNOSIS — I10 BENIGN ESSENTIAL HTN: Primary | ICD-10-CM

## 2018-10-02 PROCEDURE — 90471 IMMUNIZATION ADMIN: CPT | Performed by: FAMILY MEDICINE

## 2018-10-02 PROCEDURE — 99213 OFFICE O/P EST LOW 20 MIN: CPT | Performed by: FAMILY MEDICINE

## 2018-10-02 PROCEDURE — 90686 IIV4 VACC NO PRSV 0.5 ML IM: CPT | Performed by: FAMILY MEDICINE

## 2018-10-02 RX ORDER — ALPRAZOLAM 0.5 MG/1
TABLET ORAL
Qty: 30 TABLET | Refills: 1 | Status: SHIPPED | OUTPATIENT
Start: 2018-10-02 | End: 2019-02-05 | Stop reason: SDUPTHER

## 2018-10-02 RX ORDER — PROMETHAZINE HYDROCHLORIDE 25 MG/1
TABLET ORAL
Refills: 3 | COMMUNITY
Start: 2018-08-21

## 2018-10-02 RX ORDER — RALOXIFENE HYDROCHLORIDE 60 MG/1
TABLET, FILM COATED ORAL
Refills: 3 | COMMUNITY
Start: 2018-08-28

## 2018-10-02 RX ORDER — AMOXICILLIN AND CLAVULANATE POTASSIUM 875; 125 MG/1; MG/1
1 TABLET, FILM COATED ORAL 2 TIMES DAILY
Qty: 20 TABLET | Refills: 0 | Status: SHIPPED | OUTPATIENT
Start: 2018-10-02 | End: 2018-10-12

## 2018-10-02 NOTE — PROGRESS NOTES
Migraine     Peripheral edema      Past Surgical History:   Procedure Laterality Date    BREAST BIOPSY      LEFT BREAST    BREAST BIOPSY Right 2/12/15    [facility].     BREAST SURGERY  2004    EXCISION BENIGN RIGHT BREAST LESION     HYSTERECTOMY      TUBAL LIGATION       Social History     Social History    Marital status:      Spouse name: N/A    Number of children: N/A    Years of education: N/A     Occupational History    Not on file. Social History Main Topics    Smoking status: Never Smoker    Smokeless tobacco: Never Used    Alcohol use No      Comment: never    Drug use: Unknown    Sexual activity: Not on file     Other Topics Concern    Not on file     Social History Narrative    No narrative on file     Family History   Problem Relation Age of Onset    Other Mother         PULMONARY DX    Heart Disease Father     Heart Disease Brother           Current Outpatient Prescriptions on File Prior to Visit   Medication Sig Dispense Refill    vitamin D (ERGOCALCIFEROL) 15744 units CAPS capsule Take 1 capsule by mouth once a week 4 capsule 5    metoprolol succinate (TOPROL XL) 50 MG extended release tablet TAKE ONE TABLET BY MOUTH ONCE DAILY 90 tablet 3    simvastatin (ZOCOR) 40 MG tablet Take 1 tablet by mouth every evening 90 tablet 3    topiramate (TOPAMAX) 100 MG tablet TK ONE PO AT BEDTIME  3    ibuprofen (ADVIL;MOTRIN) 800 MG tablet   1    aspirin 81 MG tablet Take 81 mg by mouth daily      meloxicam (MOBIC) 7.5 MG tablet Take 1 tablet by mouth 2 times daily as needed for Pain 180 tablet 3     No current facility-administered medications on file prior to visit.         Objective    Vitals:    10/02/18 0830   BP: 128/80   Site: Left Upper Arm   Position: Sitting   Cuff Size: Large Adult   Pulse: 66   Resp: 16   Temp: 97.1 °F (36.2 °C)   TempSrc: Temporal   Weight: 241 lb (109.3 kg)   Height: 5' 2\" (1.575 m)     Physical Exam   Constitutional: She is well-developed, well-nourished, Procedures    INFLUENZA, QUADV, 3 YRS AND OLDER, IM, PF, PREFILL SYR OR SDV, 0.5ML (FLUZONE QUADV, PF)    Hemoglobin A1C     Standing Status:   Future     Standing Expiration Date:   10/2/2019    Basic Metabolic Panel     Standing Status:   Future     Standing Expiration Date:   10/2/2019     Orders Placed This Encounter   Medications    ALPRAZolam (XANAX) 0.5 MG tablet     Sig: TAKE ONE-HALF TABLET BY MOUTH TWICE DAILY AS NEEDED. Dispense:  30 tablet     Refill:  1     oarrs done    amoxicillin-clavulanate (AUGMENTIN) 875-125 MG per tablet     Sig: Take 1 tablet by mouth 2 times daily for 10 days     Dispense:  20 tablet     Refill:  0     Medications Discontinued During This Encounter   Medication Reason    ALPRAZolam (XANAX) 0.5 MG tablet REORDER     Return in about 4 months (around 2/2/2019) for ifg/ckd. Call or return to clinic prn if these symptoms worsen or fail to improve as anticipated.  ;  Controlled Substances Monitoring:     RX Monitoring 10/2/2018   Attestation The Prescription Monitoring Report for this patient was reviewed today. Documentation Possible medication side effects, risk of tolerance/dependence & alternative treatments discussed. ;No signs of potential drug abuse or diversion identified. Acute Pain Prescriptions -   Chronic Pain Dose reduction has been attempted. Medication Contracts Existing medication contract.    ;Controlled Med Review:    Indication-Reviewed --daily anxiety and occ attacks  Reviewed that condition still requires assistance with medication  Reviewed that condition impacts on psychological and/or physical function  Reviewed that medication does indeed improve function/pain  Reviewed patient/physician roles in compliance   Reviewed acceptable alternatives [CBT/meds/exercise/PT]  Reviewed Medication Agreement  Reviewed use/abuse/diversion of meds  No significant interactions/side effects reported          Serena Sheridan DO

## 2018-10-09 ASSESSMENT — ENCOUNTER SYMPTOMS
SINUS PRESSURE: 1
VOICE CHANGE: 0
ABDOMINAL PAIN: 0
BLOOD IN STOOL: 0
SHORTNESS OF BREATH: 0
SINUS PAIN: 1
CHOKING: 0
COUGH: 0
TROUBLE SWALLOWING: 0

## 2018-11-17 ENCOUNTER — OFFICE VISIT (OUTPATIENT)
Dept: FAMILY MEDICINE CLINIC | Age: 63
End: 2018-11-17
Payer: COMMERCIAL

## 2018-11-17 VITALS
HEART RATE: 84 BPM | TEMPERATURE: 97 F | HEIGHT: 62 IN | OXYGEN SATURATION: 96 % | DIASTOLIC BLOOD PRESSURE: 82 MMHG | RESPIRATION RATE: 16 BRPM | WEIGHT: 239 LBS | BODY MASS INDEX: 43.98 KG/M2 | SYSTOLIC BLOOD PRESSURE: 130 MMHG

## 2018-11-17 DIAGNOSIS — H65.03 BILATERAL ACUTE SEROUS OTITIS MEDIA, RECURRENCE NOT SPECIFIED: Primary | ICD-10-CM

## 2018-11-17 PROCEDURE — 99213 OFFICE O/P EST LOW 20 MIN: CPT | Performed by: NURSE PRACTITIONER

## 2018-11-17 RX ORDER — FLUTICASONE PROPIONATE 50 MCG
2 SPRAY, SUSPENSION (ML) NASAL DAILY
Qty: 1 BOTTLE | Refills: 0 | Status: SHIPPED | OUTPATIENT
Start: 2018-11-17

## 2018-11-17 ASSESSMENT — ENCOUNTER SYMPTOMS
COUGH: 0
SORE THROAT: 0

## 2018-11-19 DIAGNOSIS — E55.9 HYPOVITAMINOSIS D: ICD-10-CM

## 2018-11-19 RX ORDER — ERGOCALCIFEROL 1.25 MG/1
CAPSULE ORAL
Qty: 4 CAPSULE | Refills: 5 | Status: SHIPPED | OUTPATIENT
Start: 2018-11-19

## 2018-11-23 ENCOUNTER — TELEPHONE (OUTPATIENT)
Dept: FAMILY MEDICINE CLINIC | Age: 63
End: 2018-11-23

## 2019-02-02 DIAGNOSIS — R73.01 IFG (IMPAIRED FASTING GLUCOSE): ICD-10-CM

## 2019-02-02 DIAGNOSIS — N18.30 STAGE 3 CHRONIC KIDNEY DISEASE (HCC): ICD-10-CM

## 2019-02-02 DIAGNOSIS — I10 BENIGN ESSENTIAL HTN: ICD-10-CM

## 2019-02-02 LAB
ANION GAP SERPL CALCULATED.3IONS-SCNC: 13 MEQ/L (ref 7–13)
BUN BLDV-MCNC: 19 MG/DL (ref 8–23)
CALCIUM SERPL-MCNC: 9.3 MG/DL (ref 8.6–10.2)
CHLORIDE BLD-SCNC: 107 MEQ/L (ref 98–107)
CO2: 26 MEQ/L (ref 22–29)
CREAT SERPL-MCNC: 0.89 MG/DL (ref 0.5–0.9)
GFR AFRICAN AMERICAN: >60
GFR NON-AFRICAN AMERICAN: >60
GLUCOSE BLD-MCNC: 114 MG/DL (ref 74–109)
HBA1C MFR BLD: 6.2 % (ref 4.8–5.9)
POTASSIUM SERPL-SCNC: 4.8 MEQ/L (ref 3.5–5.1)
SODIUM BLD-SCNC: 146 MEQ/L (ref 132–144)

## 2019-02-05 ENCOUNTER — OFFICE VISIT (OUTPATIENT)
Dept: FAMILY MEDICINE CLINIC | Age: 64
End: 2019-02-05
Payer: COMMERCIAL

## 2019-02-05 VITALS
SYSTOLIC BLOOD PRESSURE: 128 MMHG | HEART RATE: 61 BPM | TEMPERATURE: 97 F | HEIGHT: 62 IN | DIASTOLIC BLOOD PRESSURE: 76 MMHG | BODY MASS INDEX: 44.16 KG/M2 | RESPIRATION RATE: 14 BRPM | WEIGHT: 240 LBS

## 2019-02-05 DIAGNOSIS — I10 BENIGN ESSENTIAL HTN: ICD-10-CM

## 2019-02-05 DIAGNOSIS — E78.5 DYSLIPIDEMIA: ICD-10-CM

## 2019-02-05 DIAGNOSIS — Z23 NEED FOR PROPHYLACTIC VACCINATION AND INOCULATION AGAINST VARICELLA: ICD-10-CM

## 2019-02-05 DIAGNOSIS — Z12.11 SCREENING FOR MALIGNANT NEOPLASM OF COLON: ICD-10-CM

## 2019-02-05 DIAGNOSIS — Z79.899 ENCOUNTER FOR LONG-TERM CURRENT USE OF HIGH RISK MEDICATION: ICD-10-CM

## 2019-02-05 DIAGNOSIS — M17.0 PRIMARY OSTEOARTHRITIS OF BOTH KNEES: ICD-10-CM

## 2019-02-05 DIAGNOSIS — R73.01 IFG (IMPAIRED FASTING GLUCOSE): ICD-10-CM

## 2019-02-05 DIAGNOSIS — F41.9 ANXIETY: Primary | ICD-10-CM

## 2019-02-05 PROCEDURE — 99214 OFFICE O/P EST MOD 30 MIN: CPT | Performed by: FAMILY MEDICINE

## 2019-02-05 RX ORDER — POLYETHYLENE GLYCOL 3350, SODIUM CHLORIDE, SODIUM BICARBONATE, POTASSIUM CHLORIDE 420; 11.2; 5.72; 1.48 G/4L; G/4L; G/4L; G/4L
4000 POWDER, FOR SOLUTION ORAL ONCE
Qty: 4000 ML | Refills: 0 | Status: SHIPPED | OUTPATIENT
Start: 2019-02-05 | End: 2019-02-05

## 2019-02-05 RX ORDER — MELOXICAM 7.5 MG/1
TABLET ORAL
Refills: 3 | COMMUNITY
Start: 2018-12-26

## 2019-02-05 RX ORDER — ALPRAZOLAM 0.5 MG/1
TABLET ORAL
Qty: 30 TABLET | Refills: 0 | Status: SHIPPED | OUTPATIENT
Start: 2019-02-05 | End: 2019-03-05 | Stop reason: SDUPTHER

## 2019-02-05 RX ORDER — ALPRAZOLAM 0.5 MG/1
0.5 TABLET ORAL DAILY PRN
COMMUNITY

## 2019-02-08 DIAGNOSIS — Z79.899 ENCOUNTER FOR LONG-TERM CURRENT USE OF HIGH RISK MEDICATION: ICD-10-CM

## 2019-02-12 ENCOUNTER — TELEPHONE (OUTPATIENT)
Dept: FAMILY MEDICINE CLINIC | Age: 64
End: 2019-02-12

## 2019-02-12 LAB
ALCOHOL URINE: NEGATIVE MG/DL
AMPHETAMINE SCREEN, URINE: NEGATIVE NG/ML
BARBITURATE SCREEN URINE: NEGATIVE NG/ML
BENZODIAZEPINE SCREEN, URINE: POSITIVE NG/ML
CANNABINOID SCREEN URINE: NEGATIVE NG/ML
COCAINE METABOLITE SCREEN URINE: NEGATIVE NG/ML
CREATININE URINE: 49.8 MG/DL (ref 20–400)
Lab: NORMAL
MDMA URINE: NEGATIVE NG/ML
METHADONE SCREEN, URINE: NEGATIVE NG/ML
OPIATE SCREEN URINE: NEGATIVE NG/ML
OXYCODONE SCREEN URINE: NEGATIVE NG/ML
PHENCYCLIDINE SCREEN URINE: NEGATIVE NG/ML
PROPOXYPHENE SCREEN: NEGATIVE NG/ML

## 2019-02-12 ASSESSMENT — ENCOUNTER SYMPTOMS
BACK PAIN: 1
ABDOMINAL PAIN: 0
NAUSEA: 0
BLOOD IN STOOL: 0
SHORTNESS OF BREATH: 0
COUGH: 0

## 2019-02-25 ENCOUNTER — TELEPHONE (OUTPATIENT)
Dept: FAMILY MEDICINE CLINIC | Age: 64
End: 2019-02-25

## 2019-03-05 ENCOUNTER — OFFICE VISIT (OUTPATIENT)
Dept: FAMILY MEDICINE CLINIC | Age: 64
End: 2019-03-05
Payer: COMMERCIAL

## 2019-03-05 VITALS
TEMPERATURE: 96.8 F | SYSTOLIC BLOOD PRESSURE: 104 MMHG | HEIGHT: 62 IN | RESPIRATION RATE: 16 BRPM | WEIGHT: 232 LBS | BODY MASS INDEX: 42.69 KG/M2 | DIASTOLIC BLOOD PRESSURE: 72 MMHG | HEART RATE: 72 BPM

## 2019-03-05 DIAGNOSIS — F41.9 ANXIETY: Primary | ICD-10-CM

## 2019-03-05 DIAGNOSIS — E55.9 HYPOVITAMINOSIS D: ICD-10-CM

## 2019-03-05 DIAGNOSIS — R73.01 IFG (IMPAIRED FASTING GLUCOSE): ICD-10-CM

## 2019-03-05 DIAGNOSIS — I10 BENIGN ESSENTIAL HTN: ICD-10-CM

## 2019-03-05 PROCEDURE — 99213 OFFICE O/P EST LOW 20 MIN: CPT | Performed by: FAMILY MEDICINE

## 2019-03-05 RX ORDER — ALPRAZOLAM 0.5 MG/1
TABLET ORAL
Qty: 30 TABLET | Refills: 0 | Status: SHIPPED | OUTPATIENT
Start: 2019-03-05 | End: 2019-05-06

## 2019-03-05 ASSESSMENT — PATIENT HEALTH QUESTIONNAIRE - PHQ9
SUM OF ALL RESPONSES TO PHQ9 QUESTIONS 1 & 2: 0
1. LITTLE INTEREST OR PLEASURE IN DOING THINGS: 0
SUM OF ALL RESPONSES TO PHQ QUESTIONS 1-9: 0
SUM OF ALL RESPONSES TO PHQ QUESTIONS 1-9: 0
2. FEELING DOWN, DEPRESSED OR HOPELESS: 0

## 2019-03-13 ASSESSMENT — ENCOUNTER SYMPTOMS
ABDOMINAL PAIN: 0
BLOOD IN STOOL: 0
SHORTNESS OF BREATH: 0

## 2023-02-13 PROBLEM — N60.99 ATYPICAL DUCTAL HYPERPLASIA OF BREAST: Status: ACTIVE | Noted: 2023-02-13

## 2023-02-13 PROBLEM — G43.909 MIGRAINE: Status: ACTIVE | Noted: 2023-02-13

## 2023-02-13 PROBLEM — E78.5 DYSLIPIDEMIA: Status: ACTIVE | Noted: 2023-02-13

## 2023-02-13 PROBLEM — H69.91 DYSFUNCTION OF RIGHT EUSTACHIAN TUBE: Status: ACTIVE | Noted: 2023-02-13

## 2023-02-13 PROBLEM — I10 HYPERTENSION: Status: ACTIVE | Noted: 2023-02-13

## 2023-02-13 PROBLEM — R63.5 ABNORMAL WEIGHT GAIN: Status: ACTIVE | Noted: 2023-02-13

## 2023-02-13 PROBLEM — E55.9 VITAMIN D DEFICIENCY: Status: ACTIVE | Noted: 2023-02-13

## 2023-02-13 PROBLEM — F41.9 ANXIETY DISORDER: Status: ACTIVE | Noted: 2023-02-13

## 2023-02-13 PROBLEM — D05.00 BREAST NEOPLASM, TIS (LCIS): Status: ACTIVE | Noted: 2023-02-13

## 2023-02-13 PROBLEM — H81.11 BENIGN PAROXYSMAL POSITIONAL VERTIGO OF RIGHT EAR: Status: RESOLVED | Noted: 2023-02-13 | Resolved: 2023-02-13

## 2023-02-13 PROBLEM — M17.0 PRIMARY OSTEOARTHRITIS OF BOTH KNEES: Status: ACTIVE | Noted: 2023-02-13

## 2023-02-13 PROBLEM — J01.00 ACUTE NON-RECURRENT MAXILLARY SINUSITIS: Status: RESOLVED | Noted: 2023-02-13 | Resolved: 2023-02-13

## 2023-02-13 RX ORDER — SIMVASTATIN 40 MG/1
40 TABLET, FILM COATED ORAL DAILY
COMMUNITY
Start: 2017-12-03 | End: 2023-03-30

## 2023-02-13 RX ORDER — ASPIRIN 81 MG/1
81 TABLET ORAL DAILY
COMMUNITY

## 2023-02-13 RX ORDER — ALPRAZOLAM 0.5 MG/1
0.5 TABLET ORAL DAILY
COMMUNITY
Start: 2019-04-10 | End: 2023-04-14 | Stop reason: SDUPTHER

## 2023-02-13 RX ORDER — ERGOCALCIFEROL 1.25 MG/1
1.25 CAPSULE ORAL
COMMUNITY
Start: 2018-07-02 | End: 2023-07-07 | Stop reason: SDUPTHER

## 2023-02-13 RX ORDER — IBUPROFEN 600 MG/1
600 TABLET ORAL
COMMUNITY
Start: 2022-07-22 | End: 2023-05-16

## 2023-02-13 RX ORDER — METOPROLOL SUCCINATE 50 MG/1
50 TABLET, EXTENDED RELEASE ORAL DAILY
COMMUNITY
Start: 2017-06-23 | End: 2023-04-11

## 2023-02-13 RX ORDER — MECLIZINE HCL 12.5 MG 12.5 MG/1
12.5 TABLET ORAL 3 TIMES DAILY PRN
COMMUNITY
Start: 2022-10-21 | End: 2024-01-08 | Stop reason: SDUPTHER

## 2023-03-10 ENCOUNTER — TELEPHONE (OUTPATIENT)
Dept: PRIMARY CARE | Facility: CLINIC | Age: 68
End: 2023-03-10
Payer: MEDICARE

## 2023-03-10 DIAGNOSIS — Z12.31 ENCOUNTER FOR SCREENING MAMMOGRAM FOR BREAST CANCER: ICD-10-CM

## 2023-03-30 DIAGNOSIS — E78.5 HYPERLIPIDEMIA, UNSPECIFIED: ICD-10-CM

## 2023-03-30 RX ORDER — SIMVASTATIN 40 MG/1
TABLET, FILM COATED ORAL
Qty: 90 TABLET | Refills: 3 | Status: SHIPPED | OUTPATIENT
Start: 2023-03-30 | End: 2024-01-31 | Stop reason: ALTCHOICE

## 2023-04-07 ENCOUNTER — HOSPITAL ENCOUNTER (OUTPATIENT)
Dept: WOMENS IMAGING | Age: 68
Discharge: HOME | End: 2023-04-07
Payer: COMMERCIAL

## 2023-04-07 DIAGNOSIS — R29.890 LOSS OF HEIGHT: ICD-10-CM

## 2023-04-07 DIAGNOSIS — E28.319 PREMATURE MENOPAUSE: ICD-10-CM

## 2023-04-07 PROCEDURE — 77080 DXA BONE DENSITY AXIAL: CPT

## 2023-04-11 DIAGNOSIS — I10 ESSENTIAL (PRIMARY) HYPERTENSION: ICD-10-CM

## 2023-04-11 RX ORDER — METOPROLOL SUCCINATE 50 MG/1
TABLET, EXTENDED RELEASE ORAL
Qty: 90 TABLET | Refills: 3 | Status: SHIPPED | OUTPATIENT
Start: 2023-04-11 | End: 2024-01-08 | Stop reason: SDUPTHER

## 2023-04-14 ENCOUNTER — OFFICE VISIT (OUTPATIENT)
Dept: PRIMARY CARE | Facility: CLINIC | Age: 68
End: 2023-04-14
Payer: MEDICARE

## 2023-04-14 VITALS
WEIGHT: 221 LBS | HEIGHT: 63 IN | RESPIRATION RATE: 16 BRPM | TEMPERATURE: 96.8 F | OXYGEN SATURATION: 96 % | DIASTOLIC BLOOD PRESSURE: 72 MMHG | HEART RATE: 63 BPM | BODY MASS INDEX: 39.16 KG/M2 | SYSTOLIC BLOOD PRESSURE: 118 MMHG

## 2023-04-14 DIAGNOSIS — Z00.00 ROUTINE GENERAL MEDICAL EXAMINATION AT HEALTH CARE FACILITY: ICD-10-CM

## 2023-04-14 DIAGNOSIS — F41.9 ANXIETY DISORDER, UNSPECIFIED TYPE: ICD-10-CM

## 2023-04-14 DIAGNOSIS — Z79.899 MEDICATION MANAGEMENT: Primary | ICD-10-CM

## 2023-04-14 PROCEDURE — 1159F MED LIST DOCD IN RCRD: CPT | Performed by: FAMILY MEDICINE

## 2023-04-14 PROCEDURE — 1160F RVW MEDS BY RX/DR IN RCRD: CPT | Performed by: FAMILY MEDICINE

## 2023-04-14 PROCEDURE — 80354 DRUG SCREENING FENTANYL: CPT

## 2023-04-14 PROCEDURE — 80358 DRUG SCREENING METHADONE: CPT

## 2023-04-14 PROCEDURE — 3074F SYST BP LT 130 MM HG: CPT | Performed by: FAMILY MEDICINE

## 2023-04-14 PROCEDURE — 80346 BENZODIAZEPINES1-12: CPT

## 2023-04-14 PROCEDURE — 80361 OPIATES 1 OR MORE: CPT

## 2023-04-14 PROCEDURE — 80307 DRUG TEST PRSMV CHEM ANLYZR: CPT

## 2023-04-14 PROCEDURE — 80373 DRUG SCREENING TRAMADOL: CPT

## 2023-04-14 PROCEDURE — 3078F DIAST BP <80 MM HG: CPT | Performed by: FAMILY MEDICINE

## 2023-04-14 PROCEDURE — 1170F FXNL STATUS ASSESSED: CPT | Performed by: FAMILY MEDICINE

## 2023-04-14 PROCEDURE — 99212 OFFICE O/P EST SF 10 MIN: CPT | Performed by: FAMILY MEDICINE

## 2023-04-14 PROCEDURE — 1036F TOBACCO NON-USER: CPT | Performed by: FAMILY MEDICINE

## 2023-04-14 PROCEDURE — 80368 SEDATIVE HYPNOTICS: CPT

## 2023-04-14 PROCEDURE — 80365 DRUG SCREENING OXYCODONE: CPT

## 2023-04-14 RX ORDER — ALPRAZOLAM 0.5 MG/1
0.5 TABLET ORAL DAILY
Qty: 30 TABLET | Refills: 0 | Status: SHIPPED | OUTPATIENT
Start: 2023-04-14 | End: 2023-07-07 | Stop reason: SDUPTHER

## 2023-04-14 ASSESSMENT — ACTIVITIES OF DAILY LIVING (ADL)
MANAGING_FINANCES: INDEPENDENT
TAKING_MEDICATION: INDEPENDENT
DOING_HOUSEWORK: INDEPENDENT
GROCERY_SHOPPING: INDEPENDENT
BATHING: INDEPENDENT
DRESSING: INDEPENDENT

## 2023-04-14 ASSESSMENT — PATIENT HEALTH QUESTIONNAIRE - PHQ9
SUM OF ALL RESPONSES TO PHQ9 QUESTIONS 1 AND 2: 0
2. FEELING DOWN, DEPRESSED OR HOPELESS: NOT AT ALL
1. LITTLE INTEREST OR PLEASURE IN DOING THINGS: NOT AT ALL

## 2023-04-14 ASSESSMENT — ENCOUNTER SYMPTOMS
OCCASIONAL FEELINGS OF UNSTEADINESS: 0
DEPRESSION: 0
LOSS OF SENSATION IN FEET: 0

## 2023-04-14 NOTE — PROGRESS NOTES
OARRS:  Mor Neri, DO on 4/14/2023  9:48 AM  I believe that it is clinically appropriate for Beth Quinones to be prescribed this medication    Is the patient prescribed a combination of a benzodiazepine and opioid?  No    Last Urine Drug Screen / ordered today: Yes  Recent Results (from the past 60745 hour(s))   OPIATE/OPIOID/BENZO PRESCRIPTION COMPLIANCE    Collection Time: 04/14/23 10:19 AM   Result Value Ref Range    DRUG SCREEN COMMENT URINE SEE BELOW     Creatine, Urine 295.4 mg/dL    Amphetamine Screen, Urine PRESUMPTIVE NEGATIVE NEGATIVE    Barbiturate Screen, Urine PRESUMPTIVE NEGATIVE NEGATIVE    Cannabinoid Screen, Urine PRESUMPTIVE NEGATIVE NEGATIVE    Cocaine Screen, Urine PRESUMPTIVE NEGATIVE NEGATIVE    PCP Screen, Urine PRESUMPTIVE NEGATIVE NEGATIVE     Results are as expected.     Controlled Substance Agreement:  Date of the Last Agreement: 04-  Reviewed Controlled Substance Agreement including but not limited to the benefits, risks, and alternatives to treatment with a Controlled Substance medication(s).    Benzodiazepines:  What is the patient's goal of therapy?  Daily quality of life issue regarding her anxiety especially in the evening hours from domestic issues.  Benefit still outweighs her risk and no evidence of abuse side effects or divergence  Is this being achieved with current treatment? yes    MARCELA-7:  No data recorded    Activities of Daily Living:   Is your overall impression that this patient is benefiting (symptom reduction outweighs side effects) from benzodiazepine therapy? Yes     1. Physical Functioning: Better  2. Family Relationship: Better  3. Social Relationship: Better  4. Mood: Better  5. Sleep Patterns: Better  6. Overall Function: BetterSubjective   Reason for Visit: Beth Quinones is an 67 y.o. female here for a Medicare Wellness visit.   59-year-old female is here for Medicare annual wellness review          Reviewed all medications by prescribing practitioner  or clinical pharmacist (such as prescriptions, OTCs, herbal therapies and supplements) and documented in the medical record.    HPI  Pleasant 67-year-old female with a history of hypertension and mild dyslipidemia is here for Medicare wellness review.  Otherwise she is done very well with her metoprolol in the morning as well as baby aspirin and Zocor in the evening.  She is tries to maintain a fairly good low-salt low carbohydrate and especially low-fat diet.  Patient remains active employed without any resting or exertional chest pain shortness breath palpitations or new GI- issues.  Does take Xanax 0.5 mg in the evening which is when her 's Alzheimer's behavior is exacerbated.  She is done quite well with this and this is a quality-of-life issue benefit still outweighs the risk no evidence of abuse or side effects.  Benzodiazepine template was completed-OARRS completed  Med agreement and urine tox screen was also performed today.  Patient Care Team:  Mor Neri DO as PCP - General  Mor Neri DO as PCP - [employer] PCP     Review of Systems   Constitutional:  Negative for fatigue and fever.   Eyes:  Negative for visual disturbance.   Respiratory:  Negative for cough, chest tightness and shortness of breath.    Cardiovascular:  Negative for chest pain, palpitations and leg swelling.   Gastrointestinal:  Negative for abdominal pain and blood in stool.   Genitourinary:  Negative for dysuria, frequency and hematuria.   Musculoskeletal:  Positive for arthralgias. Negative for myalgias.   Skin:  Negative for rash.   Neurological:  Negative for dizziness, weakness and headaches.   Psychiatric/Behavioral:  Positive for sleep disturbance. Negative for behavioral problems and suicidal ideas. The patient is nervous/anxious.        Objective   Vitals:  /72 (BP Location: Right arm, Patient Position: Sitting, BP Cuff Size: Large adult)   Pulse 63   Temp 36 °C (96.8 °F) (Temporal)   Resp  "16   Ht 1.6 m (5' 3\")   Wt 100 kg (221 lb)   SpO2 96%   BMI 39.15 kg/m²       LOUISE BENSON MD  MRN: [id number]  Patient Name: JENNIFER SANON     STUDY:  DIGITAL SCREENING BILATERAL MAMMOGRAM WITH BREAST TOMOSYNTHESIS AND  WITH CAD;  3/17/2023 8:32 am     INDICATION:  Routine screening.     COMPARISON:  08/16/2021     ACCESSION NUMBER(S):  [id number]     ORDERING CLINICIAN:  ARVIND NEWMAN     FINDINGS:  2D and tomosynthesis images were reviewed at 1 mm slice thickness.  Images were obtained in MLO and CC projections.     There are areas of scattered fibroglandular tissue. No suspicious  masses or calcifications are identified. There are stable areas of  asymmetry bilaterally.     This study was interpreted with CAD.     IMPRESSION:  No mammographic evidence of malignancy.     BI-RADS CATEGORY:     Category: 1 - Negative.  Recommendation: 1 Year Screening.     Result History    Physical Exam  Vital signs reviewed and normal  Neck neck is supple no masses adenopathy bruits or rigidity thyroid normal  Respiratory-chest clear anteriorly and posteriorly  Cardiovascular RSR without significant murmurs gallop or ectopy  Assessment/Plan   Problem List Items Addressed This Visit          Other    Anxiety disorder   We will continue Xanax if needed in the evenings of benefits always a rest  OARRS completed.  Template for benzodiazepines completed  Med agreement and urine tox screen ordered  Continue above statin therapy antihypertensive therapy.  Follow-up in 2 to 3 months for annual lab.  Otherwise continue health routines  Mammography will be performed later this winter which is up-to-date.  He is up-to-date  Immunization up-to-date and suggested getting shingles shot this summer.  Otherwise clinically stable and above medicines  @discharge  The above diagnosis and treatment plan was discussed with the patient patient will continue appropriate diet and exercise as reviewed  Patient will recheck earlier if any interval problems " of significance or clinical worsening of the above problems.  Agrees above surveillance.  All question were addressed regarding above meds

## 2023-04-16 ASSESSMENT — ENCOUNTER SYMPTOMS
COUGH: 0
NERVOUS/ANXIOUS: 1
WEAKNESS: 0
DIZZINESS: 0
SHORTNESS OF BREATH: 0
HEMATURIA: 0
HEADACHES: 0
SLEEP DISTURBANCE: 1
BLOOD IN STOOL: 0
MYALGIAS: 0
ARTHRALGIAS: 1
FEVER: 0
CHEST TIGHTNESS: 0
ABDOMINAL PAIN: 0
FREQUENCY: 0
DYSURIA: 0
FATIGUE: 0
PALPITATIONS: 0

## 2023-04-19 LAB
6-ACETYLMORPHINE: <25 NG/ML
7-AMINOCLONAZEPAM: <25 NG/ML
ALPHA-HYDROXYALPRAZOLAM: 349 NG/ML
ALPHA-HYDROXYMIDAZOLAM: <25 NG/ML
ALPRAZOLAM: 106 NG/ML
AMPHETAMINE (PRESENCE) IN URINE BY SCREEN METHOD: ABNORMAL
BARBITURATES PRESENCE IN URINE BY SCREEN METHOD: ABNORMAL
CANNABINOIDS IN URINE BY SCREEN METHOD: ABNORMAL
CHLORDIAZEPOXIDE: <25 NG/ML
CLONAZEPAM: <25 NG/ML
COCAINE (PRESENCE) IN URINE BY SCREEN METHOD: ABNORMAL
CODEINE: <50 NG/ML
CREATINE, URINE FOR DRUG: 295.4 MG/DL
DIAZEPAM: <25 NG/ML
DRUG SCREEN COMMENT URINE: ABNORMAL
EDDP: <25 NG/ML
FENTANYL CONFIRMATION, URINE: <2.5 NG/ML
HYDROCODONE: <25 NG/ML
HYDROMORPHONE: <25 NG/ML
LORAZEPAM: <25 NG/ML
METHADONE CONFIRMATION,URINE: <25 NG/ML
MIDAZOLAM: <25 NG/ML
MORPHINE URINE: <50 NG/ML
NORDIAZEPAM: <25 NG/ML
NORFENTANYL: <2.5 NG/ML
NORHYDROCODONE: <25 NG/ML
NOROXYCODONE: <25 NG/ML
O-DESMETHYLTRAMADOL: <50 NG/ML
OXAZEPAM: <25 NG/ML
OXYCODONE: <25 NG/ML
OXYMORPHONE: <25 NG/ML
PHENCYCLIDINE (PRESENCE) IN URINE BY SCREEN METHOD: ABNORMAL
TEMAZEPAM: <25 NG/ML
TRAMADOL: <50 NG/ML
ZOLPIDEM METABOLITE (ZCA): <25 NG/ML
ZOLPIDEM: <25 NG/ML

## 2023-05-16 DIAGNOSIS — M17.0 PRIMARY OSTEOARTHRITIS OF BOTH KNEES: ICD-10-CM

## 2023-05-16 RX ORDER — IBUPROFEN 600 MG/1
TABLET ORAL
Qty: 60 TABLET | Refills: 3 | Status: SHIPPED | OUTPATIENT
Start: 2023-05-16 | End: 2023-09-19 | Stop reason: SDUPTHER

## 2023-07-07 ENCOUNTER — OFFICE VISIT (OUTPATIENT)
Dept: PRIMARY CARE | Facility: CLINIC | Age: 68
End: 2023-07-07
Payer: MEDICARE

## 2023-07-07 VITALS
DIASTOLIC BLOOD PRESSURE: 76 MMHG | BODY MASS INDEX: 36.62 KG/M2 | TEMPERATURE: 97.3 F | OXYGEN SATURATION: 98 % | SYSTOLIC BLOOD PRESSURE: 120 MMHG | HEIGHT: 62 IN | RESPIRATION RATE: 16 BRPM | HEART RATE: 71 BPM | WEIGHT: 199 LBS

## 2023-07-07 DIAGNOSIS — F41.9 ANXIETY DISORDER, UNSPECIFIED TYPE: Primary | ICD-10-CM

## 2023-07-07 DIAGNOSIS — E78.5 DYSLIPIDEMIA: ICD-10-CM

## 2023-07-07 DIAGNOSIS — E55.9 VITAMIN D DEFICIENCY: ICD-10-CM

## 2023-07-07 DIAGNOSIS — I10 PRIMARY HYPERTENSION: ICD-10-CM

## 2023-07-07 PROCEDURE — 99213 OFFICE O/P EST LOW 20 MIN: CPT | Performed by: FAMILY MEDICINE

## 2023-07-07 PROCEDURE — 3078F DIAST BP <80 MM HG: CPT | Performed by: FAMILY MEDICINE

## 2023-07-07 PROCEDURE — 3074F SYST BP LT 130 MM HG: CPT | Performed by: FAMILY MEDICINE

## 2023-07-07 PROCEDURE — 1160F RVW MEDS BY RX/DR IN RCRD: CPT | Performed by: FAMILY MEDICINE

## 2023-07-07 PROCEDURE — 1159F MED LIST DOCD IN RCRD: CPT | Performed by: FAMILY MEDICINE

## 2023-07-07 PROCEDURE — 1036F TOBACCO NON-USER: CPT | Performed by: FAMILY MEDICINE

## 2023-07-07 RX ORDER — ERGOCALCIFEROL 1.25 MG/1
1.25 CAPSULE ORAL
Qty: 13 CAPSULE | Refills: 3 | Status: SHIPPED | OUTPATIENT
Start: 2023-07-07 | End: 2024-01-08 | Stop reason: SDUPTHER

## 2023-07-07 RX ORDER — ALPRAZOLAM 0.5 MG/1
0.5 TABLET ORAL DAILY
Qty: 30 TABLET | Refills: 0 | Status: SHIPPED | OUTPATIENT
Start: 2023-07-07 | End: 2023-09-19 | Stop reason: SDUPTHER

## 2023-07-07 NOTE — PROGRESS NOTES
Subjective   Patient ID: Beth Quinones is a 68 y.o. female who presents for Anxiety (3 month follow up ).  HPI  Is an 68-year-old lady with a history of dyslipidemia hypertension and also anxiety is here to recheck on her progress on Xanax in the evening has a lot of stressors with her 's Alzheimer's and just 1 a day has made definite improvement of her life with her anxiety and insomnia.  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  Remains on statin therapy as well as metoprolol for hypertension.  Safety issues reviewed  OARRS performed  Benefit still outweighs the risk.  Benzodiazepine template completed  Patient otherwise active without active chest pain shortness of breath palpitations or new GI- issues  Patient resting a low-salt low fat diet well  Meds reviewed.  No reported side effects  OARRS:  Mor Neri, DO on 7/7/2023  8:28 AM  I have personally reviewed the OARRS report for Beth Quinones. I have considered the risks of abuse, dependence, addiction and diversion    Is the patient prescribed a combination of a benzodiazepine and opioid?  No    Last Urine Drug Screen / ordered today: Yes  Recent Results (from the past 90005 hour(s))   OPIATE/OPIOID/BENZO PRESCRIPTION COMPLIANCE    Collection Time: 04/14/23 10:19 AM   Result Value Ref Range    DRUG SCREEN COMMENT URINE SEE BELOW     Creatine, Urine 295.4 mg/dL    Amphetamine Screen, Urine PRESUMPTIVE NEGATIVE NEGATIVE    Barbiturate Screen, Urine PRESUMPTIVE NEGATIVE NEGATIVE    Cannabinoid Screen, Urine PRESUMPTIVE NEGATIVE NEGATIVE    Cocaine Screen, Urine PRESUMPTIVE NEGATIVE NEGATIVE    PCP Screen, Urine PRESUMPTIVE NEGATIVE NEGATIVE    7-Aminoclonazepam <25 Cutoff <25 ng/mL    Alpha-Hydroxyalprazolam 349 (A) Cutoff <25 ng/mL    Alpha-Hydroxymidazolam <25 Cutoff <25 ng/mL    Alprazolam 106 (A) Cutoff <25 ng/mL    Chlordiazepoxide <25 Cutoff <25 ng/mL    Clonazepam <25 Cutoff <25 ng/mL    Diazepam <25 Cutoff <25 ng/mL     Lorazepam <25 Cutoff <25 ng/mL    Midazolam <25 Cutoff <25 ng/mL    Nordiazepam <25 Cutoff <25 ng/mL    Oxazepam <25 Cutoff <25 ng/mL    Temazepam <25 Cutoff <25 ng/mL    Zolpidem <25 Cutoff <25 ng/mL    Zolpidem Metabolite (ZCA) <25 Cutoff <25 ng/mL    6-Acetylmorphine <25 Cutoff <25 ng/mL    Codeine <50 Cutoff <50 ng/mL    Hydrocodone <25 Cutoff <25 ng/mL    Hydromorphone <25 Cutoff <25 ng/mL    Morphine Urine <50 Cutoff <50 ng/mL    Norhydrocodone <25 Cutoff <25 ng/mL    Noroxycodone <25 Cutoff <25 ng/mL    Oxycodone <25 Cutoff <25 ng/mL    Oxymorphone <25 Cutoff <25 ng/mL    Tramadol <50 Cutoff <50 ng/mL    O-Desmethyltramadol <50 Cutoff <50 ng/mL    Fentanyl <2.5 Cutoff<2.5 ng/mL    Norfentanyl <2.5 Cutoff<2.5 ng/mL    METHADONE CONFIRMATION,URINE <25 Cutoff <25 ng/mL    EDDP <25 Cutoff <25 ng/mL     Results are as expected.     Controlled Substance Agreement:  Date of the Last Agreement: 4-  Reviewed Controlled Substance Agreement including but not limited to the benefits, risks, and alternatives to treatment with a Controlled Substance medication(s).    Benzodiazepines:  What is the patient's goal of therapy?  Daily a quality life control of her anxiety because of family stressors.  Benefits always risk no evidence of abuse side effects or divergence  Is this being achieved with current treatment? yes    MARCELA-7:  No data recorded    Activities of Daily Living:   Is your overall impression that this patient is benefiting (symptom reduction outweighs side effects) from benzodiazepine therapy? Yes     1. Physical Functioning: Better  2. Family Relationship: Better  3. Social Relationship: Better  4. Mood: Better  5. Sleep Patterns: Better  6. Overall Function: Better  Review of Systems   Constitutional:  Negative for fatigue and fever.   Eyes:  Negative for visual disturbance.   Respiratory:  Negative for cough, chest tightness and shortness of breath.    Cardiovascular:  Negative for chest pain,  "palpitations and leg swelling.   Gastrointestinal:  Negative for abdominal pain and blood in stool.   Genitourinary:  Negative for dysuria, frequency and hematuria.   Musculoskeletal:  Positive for back pain. Negative for arthralgias and myalgias.   Skin:  Negative for rash.   Neurological:  Negative for weakness and headaches.   Psychiatric/Behavioral:  Positive for sleep disturbance. Negative for behavioral problems and suicidal ideas. The patient is nervous/anxious.        Objective   /76 (BP Location: Right arm, Patient Position: Sitting, BP Cuff Size: Large adult)   Pulse 71   Temp 36.3 °C (97.3 °F) (Temporal)   Resp 16   Ht 1.575 m (5' 2\")   Wt 90.3 kg (199 lb)   SpO2 98%   BMI 36.40 kg/m²      suggestion  Information displayed in this report may not trend or trigger automated decision support.     C-REACTIVE PROTEIN (CRP)  Order: [id number]   Ref Range & Units 3 mo ago   CRP <0.9 mg/dL <0.3   Resulting Agency  [facility]     Specimen Collected: 03/31/23 14:30    Performed by: [facility]      [facility]  Outside Information      suggestion  Information displayed in this report may not trend or trigger automated decision support.      Contains abnormal data COMP METABOLIC PANEL  Order: [id number]   Ref Range & Units 3 mo ago   Protein, Total 6.3 - 8.0 g/dL 7.3   Albumin 3.9 - 4.9 g/dL 4.5   Calcium, Total 8.5 - 10.2 mg/dL 9.5   Bilirubin, Total 0.2 - 1.3 mg/dL 0.4   Alkaline Phosphatase 34 - 123 U/L 104   AST 13 - 35 U/L 21   ALT 7 - 38 U/L 13   Glucose 74 - 99 mg/dL 99   Comment: The American Diabetes Association (ADA) provides guidance for cutoff values for fasting glucose and random glucose. The ADA defines fasting as no caloric intake for at least 8 hours. Fasting plasma glucose results between 100 to 125 mg/dL indicate increased risk for diabetes (prediabetes).  Fasting plasma glucose results greater than or equal to 126 mg/dL meet the criteria for diagnosis of " diabetes. In the absence of unequivocal hyperglycemia, results should be confirmed by repeat testing. In a patient with classic symptoms of hyperglycemia or hyperglycemic crisis, random plasma glucose results greater than or equal to 200 mg/dL meet the criteria for diagnosis of diabetes.  Reference: Standards of Medical Care in Diabetes 2016, American Diabetes Association. Diabetes Care. 2016.39(Suppl 1).   BUN 7 - 21 mg/dL 20   Creatinine 0.58 - 0.96 mg/dL 1.02 High    Sodium 136 - 144 mmol/L 140   Potassium 3.7 - 5.1 mmol/L 3.8   Chloride 97 - 105 mmol/L 103   CO2 22 - 30 mmol/L 28   Anion Gap 9 - 18 mmol/L 9   Estimated Glomerular Filtration Rate >=60 mL/min/1.73m² 60   Comment: Estimated Gl   olesterol, LDL Direct  Order: [id number]  Status: Final result       Visible to patient: Yes (seen)    0 Result Notes       1 HM Topic         Component Ref Range & Units 1 yr ago 2 yr ago 3 yr ago   LDL Direct 0 - 129 mg/dL 105 125  CM   Comment: Elevated levels of LDL cholesterol are recognized as a key  factor in the development of atherosclerosis and CHD. The  direct LDL cholesterol test can be used to assess  cardiovascular risk and monitor therapy as a follow up to  a lipid profile when triglycerides are significantly elevated.   Resulting [facility]                 Thyroid Stimulating Hormone  Order: [id number]  Status: Final result       Visible to patient: Yes (not seen)    0 Result Notes       Component Ref Range & Units 2 yr ago   TSH 0.44 - 3.98 mIU/L 1.93   Comment:  TSH testing is performed using different testing   methodology at [facility] than at other   [facility]. Direct result comparisons should   only be made within the same method.   Resulting [facility]                   LAB COLOGUARD® COLON CANCER SCREEN: [id number]  Order: [id number]  Collected 12/27/2020 07:45       Status: Final result         Component Ref  Range & Units    NONINV COLON CA DNA+OCC BLD SCRN STL QL Not Applicable Negative   Comment: A negative result indicates a low likelihood that a colorectal cancer (CRC) or an advanced adenoma (adenomatous polyps with more advanced pre-malignant features) is present. The chance that a person with a negative Cologuard test has a colorecta              Physical Exam  Signs reviewed and normal notably lost about 18 to 20 pounds in the last year and a half.  General-inspection reveals otherwise pleasant or active individual in no acute distress  Neck neck is all without masses adenopathy bruits or rigidity  Cardiovascular-RSR without significant murmurs gallop or ectopy  Respiratory-chest clear anteriorly and posteriorly  Peripheral vascular no symmetric or asymmetric edema no sensorimotor vascular deficits  Mood mood is stable appropriately anxious but otherwise no striking anxiety depression or cognitive issues  Above lab reviewed in the last year      Assessment/Plan   Problem List Items Addressed This Visit          Endocrine/Metabolic    Vitamin D deficiency       Mental Health    Anxiety disorder   We will continue her blood pressure medicine and cholesterol medicine as well as low-salt low-fat diet  Template for benzodiazepine as well as safety issues reviewed  We will continue Xanax if needed at nighttime  Follow-up in 3 months or earlier if interval problems continue health routines keep good female health maintenance and continue SBE  @discharge  The above diagnosis and treatment plan was discussed with the patient patient will continue appropriate diet and exercise as reviewed  Patient will recheck earlier if any interval problems of significance or clinical worsening of the above problems.  Agrees above surveillance.  All question were addressed regarding above meds

## 2023-07-11 ASSESSMENT — ENCOUNTER SYMPTOMS
WEAKNESS: 0
NERVOUS/ANXIOUS: 1
PALPITATIONS: 0
FEVER: 0
DYSURIA: 0
FATIGUE: 0
SHORTNESS OF BREATH: 0
CHEST TIGHTNESS: 0
BACK PAIN: 1
HEADACHES: 0
ARTHRALGIAS: 0
FREQUENCY: 0
MYALGIAS: 0
BLOOD IN STOOL: 0
COUGH: 0
SLEEP DISTURBANCE: 1
ABDOMINAL PAIN: 0
HEMATURIA: 0

## 2023-08-29 ENCOUNTER — OFFICE VISIT (OUTPATIENT)
Dept: PRIMARY CARE | Facility: CLINIC | Age: 68
End: 2023-08-29
Payer: MEDICARE

## 2023-08-29 VITALS
OXYGEN SATURATION: 97 % | TEMPERATURE: 97.7 F | DIASTOLIC BLOOD PRESSURE: 74 MMHG | HEIGHT: 62 IN | WEIGHT: 222 LBS | HEART RATE: 72 BPM | BODY MASS INDEX: 40.85 KG/M2 | SYSTOLIC BLOOD PRESSURE: 136 MMHG
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
## 2023-08-29 PROCEDURE — 1160F RVW MEDS BY RX/DR IN RCRD: CPT | Performed by: NURSE PRACTITIONER

## 2023-08-29 PROCEDURE — 99213 OFFICE O/P EST LOW 20 MIN: CPT | Performed by: NURSE PRACTITIONER

## 2023-08-29 PROCEDURE — 3075F SYST BP GE 130 - 139MM HG: CPT | Performed by: NURSE PRACTITIONER

## 2023-08-29 PROCEDURE — 1159F MED LIST DOCD IN RCRD: CPT | Performed by: NURSE PRACTITIONER

## 2023-08-29 PROCEDURE — 1036F TOBACCO NON-USER: CPT | Performed by: NURSE PRACTITIONER

## 2023-08-29 PROCEDURE — 3078F DIAST BP <80 MM HG: CPT | Performed by: NURSE PRACTITIONER

## 2023-08-29 RX ORDER — DOXYCYCLINE 100 MG/1
100 CAPSULE ORAL 2 TIMES DAILY
Qty: 20 CAPSULE | Refills: 0 | Status: SHIPPED | OUTPATIENT
Start: 2023-08-29 | End: 2023-09-08

## 2023-08-29 RX ORDER — VITAMIN B COMPLEX
TABLET ORAL
COMMUNITY
Start: 2011-02-26

## 2023-08-29 RX ORDER — MELOXICAM 7.5 MG/1
1 TABLET ORAL 2 TIMES DAILY PRN
COMMUNITY
Start: 2022-10-13 | End: 2023-10-19

## 2023-08-29 RX ORDER — MUPIROCIN 20 MG/G
OINTMENT TOPICAL 3 TIMES DAILY
Qty: 22 G | Refills: 0 | Status: SHIPPED | OUTPATIENT
Start: 2023-08-29 | End: 2023-09-08

## 2023-08-29 ASSESSMENT — ENCOUNTER SYMPTOMS
WOUND: 1
MUSCULOSKELETAL NEGATIVE: 1
CARDIOVASCULAR NEGATIVE: 1
FATIGUE: 0
CHILLS: 0
FEVER: 0
APPETITE CHANGE: 0
RESPIRATORY NEGATIVE: 1
GASTROINTESTINAL NEGATIVE: 1

## 2023-08-29 NOTE — PROGRESS NOTES
"Subjective   Patient ID: Beth Quinones is a 68 y.o. female who presents for Insect Bite.    Patient was mowing the lawn on Saturday and she thinks she may have been bitten by something. Pt says that it keep getting bigger. It is not very itchy. Pt put hibacleanse on it. No history of staph infections. Patient is feeling well overall.          Review of Systems   Constitutional:  Negative for appetite change, chills, fatigue and fever.   HENT: Negative.     Respiratory: Negative.     Cardiovascular: Negative.    Gastrointestinal: Negative.    Musculoskeletal: Negative.    Skin:  Positive for wound.     Visit Vitals  /74   Pulse 72   Temp 36.5 °C (97.7 °F) (Temporal)   Ht 1.575 m (5' 2\")   Wt 101 kg (222 lb)   SpO2 97%   BMI 40.60 kg/m²   Smoking Status Never   BSA 2.1 m²        Physical Exam  Vitals reviewed.   Constitutional:       General: She is not in acute distress.     Appearance: Normal appearance. She is not ill-appearing.   HENT:      Head: Atraumatic.   Eyes:      Conjunctiva/sclera: Conjunctivae normal.   Cardiovascular:      Rate and Rhythm: Normal rate and regular rhythm.      Pulses: Normal pulses.      Heart sounds: Normal heart sounds. No murmur heard.  Pulmonary:      Effort: Pulmonary effort is normal.      Breath sounds: Normal breath sounds. No rhonchi.   Musculoskeletal:         General: Normal range of motion.   Skin:     General: Skin is warm and dry.      Findings: Lesion present.      Comments: Patient with an infected insect bite on the left lower leg measuring 1 cm x 1cm with central scabbing and scant purulent drainage. There is slight surrounding redness but skin is soft and non tender.    Neurological:      General: No focal deficit present.      Mental Status: She is alert.   Psychiatric:         Mood and Affect: Mood normal.         Behavior: Behavior normal.     Assessment/Plan   Problem List Items Addressed This Visit    None  Visit Diagnoses       Bug bite with infection, " initial encounter    -  Primary    Relevant Medications    doxycycline (Vibramycin) 100 mg capsule    mupirocin (Bactroban) 2 % ointment          Patient started on mupirocin and doxycycline at this time. She is to keep area clean and dry. Pt to wash area with Gold Dial antibacterial soap. Advised ER for any increased redness, tenderness, purulent drainage or new/concerning symptoms; she agreed. Pt to follow up in 2-3 days if no better despite the use of the medications; she agreed.

## 2023-09-19 ENCOUNTER — OFFICE VISIT (OUTPATIENT)
Dept: PRIMARY CARE | Facility: CLINIC | Age: 68
End: 2023-09-19
Payer: MEDICARE

## 2023-09-19 VITALS
HEIGHT: 62 IN | OXYGEN SATURATION: 97 % | RESPIRATION RATE: 16 BRPM | WEIGHT: 216 LBS | SYSTOLIC BLOOD PRESSURE: 132 MMHG | BODY MASS INDEX: 39.75 KG/M2 | TEMPERATURE: 96.4 F | HEART RATE: 77 BPM | DIASTOLIC BLOOD PRESSURE: 74 MMHG

## 2023-09-19 DIAGNOSIS — E78.5 DYSLIPIDEMIA: ICD-10-CM

## 2023-09-19 DIAGNOSIS — J01.00 ACUTE NON-RECURRENT MAXILLARY SINUSITIS: ICD-10-CM

## 2023-09-19 DIAGNOSIS — F41.9 ANXIETY DISORDER, UNSPECIFIED TYPE: Primary | ICD-10-CM

## 2023-09-19 DIAGNOSIS — I10 PRIMARY HYPERTENSION: ICD-10-CM

## 2023-09-19 DIAGNOSIS — M17.0 PRIMARY OSTEOARTHRITIS OF BOTH KNEES: ICD-10-CM

## 2023-09-19 PROCEDURE — 1160F RVW MEDS BY RX/DR IN RCRD: CPT | Performed by: FAMILY MEDICINE

## 2023-09-19 PROCEDURE — 3075F SYST BP GE 130 - 139MM HG: CPT | Performed by: FAMILY MEDICINE

## 2023-09-19 PROCEDURE — 1036F TOBACCO NON-USER: CPT | Performed by: FAMILY MEDICINE

## 2023-09-19 PROCEDURE — 99214 OFFICE O/P EST MOD 30 MIN: CPT | Performed by: FAMILY MEDICINE

## 2023-09-19 PROCEDURE — 1159F MED LIST DOCD IN RCRD: CPT | Performed by: FAMILY MEDICINE

## 2023-09-19 PROCEDURE — 3078F DIAST BP <80 MM HG: CPT | Performed by: FAMILY MEDICINE

## 2023-09-19 RX ORDER — AZITHROMYCIN 250 MG/1
TABLET, FILM COATED ORAL
Qty: 6 TABLET | Refills: 0 | Status: SHIPPED | OUTPATIENT
Start: 2023-09-19 | End: 2024-01-08 | Stop reason: ALTCHOICE

## 2023-09-19 RX ORDER — IBUPROFEN 600 MG/1
600 TABLET ORAL
Qty: 60 TABLET | Refills: 3 | Status: SHIPPED | OUTPATIENT
Start: 2023-09-19 | End: 2024-01-03

## 2023-09-19 RX ORDER — ALPRAZOLAM 0.5 MG/1
0.5 TABLET ORAL DAILY
Qty: 30 TABLET | Refills: 0 | Status: SHIPPED | OUTPATIENT
Start: 2023-09-19 | End: 2023-10-16 | Stop reason: SDUPTHER

## 2023-09-19 NOTE — PROGRESS NOTES
Subjective   Patient ID: Beth Quinones is a 68 y.o. female who presents for Sinusitis and Anxiety.  HPI  68-year-old with a history of anxiety dyslipidemia hypertension here to recheck a lot of family stressors and grief he takes her Xanax in the evening.  Otherwise patient denies any resting or exertional chest pain shortness of breath or palpitations.  No new GI  issues.  Hands-on statin therapy in the evening and metoprolol once a day for hypertension  Otherwise spouse is in psychiatric rehab at this time undergoing a lot of stress.  We did review low-salt low carbohydrate and low-fat diet as well as healthy routines.  Chronic meds reviewed no reported side effects  OARRS:  Mor Neri, DO on 9/19/2023  9:41 AM  I have personally reviewed the OARRS report for Beth Quinones. I have considered the risks of abuse, dependence, addiction and diversion    Is the patient prescribed a combination of a benzodiazepine and opioid?  No    Last Urine Drug Screen / ordered today: Yes  Recent Results (from the past 8760 hour(s))   OPIATE/OPIOID/BENZO PRESCRIPTION COMPLIANCE    Collection Time: 04/14/23 10:19 AM   Result Value Ref Range    DRUG SCREEN COMMENT URINE SEE BELOW     Creatine, Urine 295.4 mg/dL    Amphetamine Screen, Urine PRESUMPTIVE NEGATIVE NEGATIVE    Barbiturate Screen, Urine PRESUMPTIVE NEGATIVE NEGATIVE    Cannabinoid Screen, Urine PRESUMPTIVE NEGATIVE NEGATIVE    Cocaine Screen, Urine PRESUMPTIVE NEGATIVE NEGATIVE    PCP Screen, Urine PRESUMPTIVE NEGATIVE NEGATIVE    7-Aminoclonazepam <25 Cutoff <25 ng/mL    Alpha-Hydroxyalprazolam 349 (A) Cutoff <25 ng/mL    Alpha-Hydroxymidazolam <25 Cutoff <25 ng/mL    Alprazolam 106 (A) Cutoff <25 ng/mL    Chlordiazepoxide <25 Cutoff <25 ng/mL    Clonazepam <25 Cutoff <25 ng/mL    Diazepam <25 Cutoff <25 ng/mL    Lorazepam <25 Cutoff <25 ng/mL    Midazolam <25 Cutoff <25 ng/mL    Nordiazepam <25 Cutoff <25 ng/mL    Oxazepam <25 Cutoff <25 ng/mL    Temazepam <25  Cutoff <25 ng/mL    Zolpidem <25 Cutoff <25 ng/mL    Zolpidem Metabolite (ZCA) <25 Cutoff <25 ng/mL    6-Acetylmorphine <25 Cutoff <25 ng/mL    Codeine <50 Cutoff <50 ng/mL    Hydrocodone <25 Cutoff <25 ng/mL    Hydromorphone <25 Cutoff <25 ng/mL    Morphine Urine <50 Cutoff <50 ng/mL    Norhydrocodone <25 Cutoff <25 ng/mL    Noroxycodone <25 Cutoff <25 ng/mL    Oxycodone <25 Cutoff <25 ng/mL    Oxymorphone <25 Cutoff <25 ng/mL    Tramadol <50 Cutoff <50 ng/mL    O-Desmethyltramadol <50 Cutoff <50 ng/mL    Fentanyl <2.5 Cutoff<2.5 ng/mL    Norfentanyl <2.5 Cutoff<2.5 ng/mL    METHADONE CONFIRMATION,URINE <25 Cutoff <25 ng/mL    EDDP <25 Cutoff <25 ng/mL     Results are as expected.         Controlled Substance Agreement:  Date of the Last Agreement: 4-  Reviewed Controlled Substance Agreement including but not limited to the benefits, risks, and alternatives to treatment with a Controlled Substance medication(s).    Benzodiazepines:  What is the patient's goal of therapy?  The quality life control her nighttime anxiety which is certainly increased with a lot of family stressors benefits always a risk and Oarrs abuse or side effects  Is this being achieved with current treatment? yes    MARCELA-7:  No data recorded    Activities of Daily Living:   Is your overall impression that this patient is benefiting (symptom reduction outweighs side effects) from benzodiazepine therapy? Yes     1. Physical Functioning: Better  2. Family Relationship: Better  3. Social Relationship: Better  4. Mood: Better  5. Sleep Patterns: Better  6. Overall Function: Better  Review of Systems   Constitutional:  Negative for fatigue and fever.   HENT:  Positive for rhinorrhea, sinus pressure and sinus pain.    Eyes:  Negative for visual disturbance.   Respiratory:  Negative for cough, chest tightness and shortness of breath.    Cardiovascular:  Negative for chest pain, palpitations and leg swelling.   Gastrointestinal:  Negative for  "abdominal pain and blood in stool.   Genitourinary:  Negative for dysuria, frequency and hematuria.   Musculoskeletal:  Negative for arthralgias and myalgias.   Skin:  Negative for rash.   Neurological:  Negative for weakness and headaches.   Psychiatric/Behavioral:  Positive for sleep disturbance. Negative for behavioral problems, confusion and suicidal ideas. The patient is nervous/anxious.        Objective   /74 (BP Location: Right arm, Patient Position: Sitting, BP Cuff Size: Large adult)   Pulse 77   Temp 35.8 °C (96.4 °F) (Temporal)   Resp 16   Ht 1.575 m (5' 2\")   Wt 98 kg (216 lb)   SpO2 97%   BMI 39.51 kg/m²           Physical Exam  Vital signs reviewed and normal  General-reveals a pleasant interactive individual in no acute distress  ENT moderate turbinate swelling right greater than left with yellow rhinorrhea tenderness the right maxillary sinus both TMs scarred retracted without hyperemia oral exam is benign  Neck is supple no masses adenopathy bruits or rigidity thyroid is normal  Respiratory-chest clear anteriorly and posteriorly  Cardiovascular RSR without murmurs gallop or ectopy  Peripheral vascular no symmetric or asymmetric edema vascular sensorimotor deficits  Mood mood is appropriately anxious but otherwise no striking anxiety depressive or cognitive issues  Orthopedic tenderness of the medial aspect of both knees without redness warmth or effusion some achiness in the wrist bilaterally but otherwise no acute synovitis the upper and lower extremities demotion neck is normal  Neuro-no cranial nerve deficits and no focal deficit upper and lower extremities.     C-REACTIVE PROTEIN (CRP)  Order: [id number]  Component  Ref Range & Units 5 mo ago   CRP  <0.9 mg/dL <0.3   Resulting Agency [facility]     Specimen Collected: 03/31/23 14:30    Performed by: [facility]  Outside Information      suggestion  Information displayed in this report " may not trend or trigger automated decision support.      Contains abnormal data COMP METABOLIC PANEL  Order: [id number]  Component  Ref Range & Units 5 mo ago   Protein, Total  6.3 - 8.0 g/dL 7.3   Albumin  3.9 - 4.9 g/dL 4.5   Calcium, Total  8.5 - 10.2 mg/dL 9.5   Bilirubin, Total  0.2 - 1.3 mg/dL 0.4   Alkaline Phosphatase  34 - 123 U/L 104   AST  13 - 35 U/L 21   ALT  7 - 38 U/L 13   Glucose  74 - 99 mg/dL 99   Comment: The American Diabetes Association (ADA) provides guidance for cutoff values for fasting glucose and random glucose. The ADA defines fasting as no caloric intake for at least 8 hours. Fasting plasma glucose results between 100 to 125 mg/dL indicate increased risk for diabetes (prediabetes).  Fasting plasma glucose results greater than or equal to 126 mg/dL meet the criteria for diagnosis of diabetes. In the absence of unequivocal hyperglycemia, results should be confirmed by repeat testing. In a patient with classic symptoms of hyperglycemia or hyperglycemic crisis, random plasma glucose results greater than or equal to 200 mg/dL meet the criteria for diagnosis of diabetes.  Reference: Standards of Medical Care in Diabetes 2016, American Diabetes Association. Diabetes Care. 2016.39(Suppl 1).   BUN  7 - 21 mg/dL 20   Creatinine  0.58 - 0.96 mg/dL 1.02 High    Sodium  136 - 144 mmol/L 140   Potassium  3.7 - 5.1 mmol/L 3.8   Chloride  97 - 105 mmol/L 103   CO2  22 - 30 mmol/L 28   Anion Gap  9 - 18 mmol/L 9   Estimated Glomerular Filtration Rate  >=60 mL/min/1.73m² 60   Comment: Estimated     SED RATE WESTERGREN  Order: [id number]  Component  Ref Range & Units 5 mo ago   Sed Rate, Westergren  0 - 20 mm/hr 9   Resulting Agency [facility]     Specimen Collected: 03/31/23 14:30    Performed by: [facility] Last Resulted: 03/31/23 19:24   Received From: [facility]  R       Assessment/Plan   Problem List Items Addressed This Visit       Anxiety disorder     Primary osteoarthritis of both knees   Otology did not feel she had creeks acute rheumatoid arthritis more inflammatory osteoarthritis because of persistent pain in her knees shoulders and wrist on Mobic, will switch to 600 mg of Motrin twice a day  We will continue her Xanax at nighttime as benefits always a risk  Benzodiazepine template completed  Safety issues reviewed  OARRS performed  Statin therapy as well as beta-blocker and aspirin.  Follow-up in 3 months or earlier if interval problems continue healthy routines the best she can we will use low-dose Sudafed at the daytime and normal saline to the nose Z-Sinan added for the recent sinusitis call if interval worsening  @discharge  The above diagnosis and treatment plan was discussed with the patient patient will continue appropriate diet and exercise as reviewed  Patient will recheck earlier if any interval problems of significance or clinical worsening of the above problems.  Agrees above surveillance.  All question were addressed regarding above meds

## 2023-09-24 PROBLEM — J01.00 ACUTE NON-RECURRENT MAXILLARY SINUSITIS: Status: ACTIVE | Noted: 2023-02-13

## 2023-09-24 ASSESSMENT — ENCOUNTER SYMPTOMS
WEAKNESS: 0
SHORTNESS OF BREATH: 0
CHEST TIGHTNESS: 0
SINUS PAIN: 1
NERVOUS/ANXIOUS: 1
HEMATURIA: 0
COUGH: 0
CONFUSION: 0
ARTHRALGIAS: 0
HEADACHES: 0
RHINORRHEA: 1
SLEEP DISTURBANCE: 1
FEVER: 0
FATIGUE: 0
SINUS PRESSURE: 1
DYSURIA: 0
ABDOMINAL PAIN: 0
PALPITATIONS: 0
FREQUENCY: 0
MYALGIAS: 0
BLOOD IN STOOL: 0

## 2023-10-16 ENCOUNTER — OFFICE VISIT (OUTPATIENT)
Dept: PRIMARY CARE | Facility: CLINIC | Age: 68
End: 2023-10-16
Payer: MEDICARE

## 2023-10-16 VITALS
OXYGEN SATURATION: 97 % | DIASTOLIC BLOOD PRESSURE: 80 MMHG | BODY MASS INDEX: 40.48 KG/M2 | RESPIRATION RATE: 16 BRPM | HEART RATE: 66 BPM | HEIGHT: 62 IN | WEIGHT: 220 LBS | SYSTOLIC BLOOD PRESSURE: 116 MMHG | TEMPERATURE: 96.9 F

## 2023-10-16 DIAGNOSIS — I10 PRIMARY HYPERTENSION: Primary | ICD-10-CM

## 2023-10-16 DIAGNOSIS — E78.5 DYSLIPIDEMIA: ICD-10-CM

## 2023-10-16 DIAGNOSIS — J01.00 ACUTE NON-RECURRENT MAXILLARY SINUSITIS: ICD-10-CM

## 2023-10-16 DIAGNOSIS — F41.9 ANXIETY DISORDER, UNSPECIFIED TYPE: ICD-10-CM

## 2023-10-16 DIAGNOSIS — R63.5 WEIGHT GAIN: ICD-10-CM

## 2023-10-16 PROCEDURE — 1160F RVW MEDS BY RX/DR IN RCRD: CPT | Performed by: FAMILY MEDICINE

## 2023-10-16 PROCEDURE — 3074F SYST BP LT 130 MM HG: CPT | Performed by: FAMILY MEDICINE

## 2023-10-16 PROCEDURE — 1036F TOBACCO NON-USER: CPT | Performed by: FAMILY MEDICINE

## 2023-10-16 PROCEDURE — 99214 OFFICE O/P EST MOD 30 MIN: CPT | Performed by: FAMILY MEDICINE

## 2023-10-16 PROCEDURE — 3079F DIAST BP 80-89 MM HG: CPT | Performed by: FAMILY MEDICINE

## 2023-10-16 PROCEDURE — 1159F MED LIST DOCD IN RCRD: CPT | Performed by: FAMILY MEDICINE

## 2023-10-16 RX ORDER — CEFDINIR 300 MG/1
300 CAPSULE ORAL 2 TIMES DAILY
Qty: 20 CAPSULE | Refills: 0 | Status: SHIPPED | OUTPATIENT
Start: 2023-10-16 | End: 2023-10-26

## 2023-10-16 RX ORDER — ALPRAZOLAM 0.5 MG/1
0.5 TABLET ORAL DAILY
Qty: 30 TABLET | Refills: 0 | Status: SHIPPED | OUTPATIENT
Start: 2023-10-17 | End: 2024-01-08 | Stop reason: SDUPTHER

## 2023-10-16 NOTE — PROGRESS NOTES
Subjective   Patient ID: Beth Qiunones is a 68 y.o. female who presents for Sinusitis (4 week follow up ).  HPI  Pleasant 68-year-old female with a history of generalized anxiety rather revolving around a lot of stressors from her  with dementia.  Does take it perhaps once a day if needed and benefits still outweigh the risk  Safety is reviewed  Benzodiazepine template completed  Patient remains on metoprolol for hypertension as well as O4 Zocor for dyslipidemia and Motrin instead of Mobic for her knee and hip arthritis.  Otherwise tries to follow a low-salt low-fat diet fairly well.  Denies any resting or exertional chest pain shortness with palpitations or new GI- issues.  Chronic meds reviewed.  No reported side effects.  Is up to date on her shots and needs TSH with recent weight gain as well as recheck cholesterol panel.  Earlier chemistries were normal.  OARRS:  Mor Neri, DO on 10/19/2023  2:43 PM  I have personally reviewed the OARRS report for Beth Quinones. I have considered the risks of abuse, dependence, addiction and diversion    Is the patient prescribed a combination of a benzodiazepine and opioid?  No    Last Urine Drug Screen / ordered today: Yes  Recent Results (from the past 8760 hour(s))   OPIATE/OPIOID/BENZO PRESCRIPTION COMPLIANCE    Collection Time: 04/14/23 10:19 AM   Result Value Ref Range    DRUG SCREEN COMMENT URINE SEE BELOW     Creatine, Urine 295.4 mg/dL    Amphetamine Screen, Urine PRESUMPTIVE NEGATIVE NEGATIVE    Barbiturate Screen, Urine PRESUMPTIVE NEGATIVE NEGATIVE    Cannabinoid Screen, Urine PRESUMPTIVE NEGATIVE NEGATIVE    Cocaine Screen, Urine PRESUMPTIVE NEGATIVE NEGATIVE    PCP Screen, Urine PRESUMPTIVE NEGATIVE NEGATIVE    7-Aminoclonazepam <25 Cutoff <25 ng/mL    Alpha-Hydroxyalprazolam 349 (A) Cutoff <25 ng/mL    Alpha-Hydroxymidazolam <25 Cutoff <25 ng/mL    Alprazolam 106 (A) Cutoff <25 ng/mL    Chlordiazepoxide <25 Cutoff <25 ng/mL    Clonazepam <25  Cutoff <25 ng/mL    Diazepam <25 Cutoff <25 ng/mL    Lorazepam <25 Cutoff <25 ng/mL    Midazolam <25 Cutoff <25 ng/mL    Nordiazepam <25 Cutoff <25 ng/mL    Oxazepam <25 Cutoff <25 ng/mL    Temazepam <25 Cutoff <25 ng/mL    Zolpidem <25 Cutoff <25 ng/mL    Zolpidem Metabolite (ZCA) <25 Cutoff <25 ng/mL    6-Acetylmorphine <25 Cutoff <25 ng/mL    Codeine <50 Cutoff <50 ng/mL    Hydrocodone <25 Cutoff <25 ng/mL    Hydromorphone <25 Cutoff <25 ng/mL    Morphine Urine <50 Cutoff <50 ng/mL    Norhydrocodone <25 Cutoff <25 ng/mL    Noroxycodone <25 Cutoff <25 ng/mL    Oxycodone <25 Cutoff <25 ng/mL    Oxymorphone <25 Cutoff <25 ng/mL    Tramadol <50 Cutoff <50 ng/mL    O-Desmethyltramadol <50 Cutoff <50 ng/mL    Fentanyl <2.5 Cutoff<2.5 ng/mL    Norfentanyl <2.5 Cutoff<2.5 ng/mL    METHADONE CONFIRMATION,URINE <25 Cutoff <25 ng/mL    EDDP <25 Cutoff <25 ng/mL     Results are as expected.         Controlled Substance Agreement:  Date of the Last Agreement: 04-  Reviewed Controlled Substance Agreement including but not limited to the benefits, risks, and alternatives to treatment with a Controlled Substance medication(s).    Benzodiazepines:  What is the patient's goal of therapy?  Daily as needed quality life control of her anxiety and review of family dynamics and stressors.  Benefit still outweighs the risk and Oarrs abuse or side effects  Is this being achieved with current treatment? yes    MARCELA-7:  No data recorded    Activities of Daily Living:   Is your overall impression that this patient is benefiting (symptom reduction outweighs side effects) from benzodiazepine therapy? Yes     1. Physical Functioning: Better  2. Family Relationship: Better  3. Social Relationship: Better  4. Mood: Better  5. Sleep Patterns: Better  6. Overall Function: Better  Review of Systems   Constitutional:  Positive for unexpected weight change. Negative for fatigue and fever.   HENT:  Positive for rhinorrhea, sinus pain and sore  "throat. Negative for ear pain and voice change.    Eyes:  Negative for visual disturbance.   Respiratory:  Negative for cough, chest tightness and shortness of breath.    Cardiovascular:  Negative for chest pain, palpitations and leg swelling.   Gastrointestinal:  Negative for abdominal pain and blood in stool.   Genitourinary:  Negative for dysuria, frequency and hematuria.   Musculoskeletal:  Positive for arthralgias and myalgias.   Skin:  Negative for rash.   Neurological:  Negative for weakness, light-headedness and headaches.   Psychiatric/Behavioral:  Positive for sleep disturbance. Negative for behavioral problems, confusion and suicidal ideas. The patient is nervous/anxious.        Objective   /80 (BP Location: Right arm, Patient Position: Sitting, BP Cuff Size: Large adult)   Pulse 66   Temp 36.1 °C (96.9 °F) (Temporal)   Resp 16   Ht 1.575 m (5' 2\")   Wt 99.8 kg (220 lb)   SpO2 97%   BMI 40.24 kg/m²     Order: [id number]  Component  Ref Range & Units 6 mo ago   WBC  3.70 - 11.00 k/uL 6.18   RBC  3.90 - 5.20 m/uL 4.20   Hemoglobin  11.5 - 15.5 g/dL 13.1   Hematocrit  36.0 - 46.0 % 39.1   MCV  80.0 - 100.0 fL 93.1   MCH  26.0 - 34.0 pg 31.2   MCHC  30.5 - 36.0 g/dL 33.5   RDW-CV  11.5 - 15.0 % 12.2   Platelet Count  150 - 400 k/uL 280   MPV  9.0 - 12.7 fL 9.4   Neutrophils %  % 48.5   Abs Neut  1.45 - 7.50 k/uL 3.00   Lymphocytes %  % 39.0   Abs Lymph  1.00 - 4.00 k/uL 2.41   Monocytes %  % 5.7   Abs Mono  <0.87 k/uL 0.35   Eosinophils %  % 5.5   Abs Eosin  <0.46 k/uL 0.34   Basophils %  % 1.1   Abs Baso  <0.11 k/uL 0.07   Immature Granulocytes %  % 0.2   Abs Immature Gran  <0.10 k/uL <0.03   NRBC  /100 WBC 0.0   Absolute nRBC  <0.01 k/uL <0.01   Diff Type Auto   Resulting Agency [facility]   Narrative  Performed by [facility]  This is an appended report.  These results have been appended to a previously verified report.    Specimen Collected: 03/31/23 14:30  "   Performed by: [facility] Last Resulted: 03/31/23 14:52   Received From: [facility]  Result Received: 07/07/23 08:04    Received Information  Result Report    CBC + DIFF (Order #[id number]) on 3/31/23     Lab Component SmartPhcasee Guide    [facility]  Outside Information      suggestion  Information displayed in this report may not trend or trigger automated decision support.      Contains abnormal data COMP METABOLIC PANEL  Order: [id number]  Component  Ref Range & Units 6 mo ago   Protein, Total  6.3 - 8.0 g/dL 7.3   Albumin  3.9 - 4.9 g/dL 4.5   Calcium, Total  8.5 - 10.2 mg/dL 9.5   Bilirubin, Total  0.2 - 1.3 mg/dL 0.4   Alkaline Phosphatase  34 - 123 U/L 104   AST  13 - 35 U/L 21   ALT  7 - 38 U/L 13   Glucose  74 - 99 mg/dL 99   Comment: The American Diabetes Association (ADA) provides guidance for cutoff values for fasting glucose and random glucose. The ADA defines fasting as no caloric intake for at least 8 hours. Fasting plasma glucose results between 100 to 125 mg/dL indicate increased risk for diabetes (prediabetes).  Fasting plasma glucose results greater than or equal to 126 mg/dL meet the criteria for diagnosis of diabetes. In the absence of unequivocal hyperglycemia, results should be confirmed by repeat testing. In a patient with classic symptoms of hyperglycemia or hyperglycemic crisis, random plasma glucose results greater than or equal to 200 mg/dL meet the criteria for diagnosis of diabetes.  Reference: Standards of Medical Care in Diabetes 2016, American Diabetes Association. Diabetes Care. 2016.39(Suppl 1).   BUN  7 - 21 mg/dL 20   Creatinine  0.58 - 0.96 mg/dL 1.02 High    Sodium  136 - 144 mmol/L 140   Potassium  3.7 - 5.1 mmol/L 3.8   Chloride  97 - 105 mmol/L 103   CO2  22 - 30 mmol/L 28   Anion Gap  9 - 18 mmol/L 9   Estimated Glomerular Filtration Rate  >=60 mL/min/1.73m²    Cholesterol, LDL Direct  Order: [id number]  Status: Final result       Visible to patient:  Yes (seen)    0 Result Notes       1 HM Topic        Component  Ref Range & Units 1 yr ago 2 yr ago 3 yr ago   LDL Direct  0 - 129 mg/dL 105 125  CM   Comment: Elevated levels of LDL cholesterol are recognized as a key  factor in the development of atherosclerosis and CHD. The  direct LDL cholesterol test can be used to assess  cardiovascular risk and monitor therapy as a follow up to  a lipid profile when triglycerides are significantly elevated.   Resulting [facility]              Specimen Collected: 07/05/22 11:02 Last Resulted: 07/05/22 11:40                Cholesterol, LDL Direct  Order: [id number]  Status: Final result       Visible to patient: Yes (seen)    0 Result Notes       1 HM Topic        Component  Ref Range & Units 1 yr ago 2 yr ago 3 yr ago   LDL Direct  0 - 129 mg/dL 105 125  CM   Comment: Elevated levels of LDL cholesterol are recognized as a key  factor in the development of atherosclerosis and CHD. The  direct LDL cholesterol test can be used to assess  cardiovascular risk and monitor therapy as a follow up to  a lipid profile when triglycerides are significantly elevated.   Resulting [facility]              Specimen Collected: 07/05/22 11:02 Last Resulted: 07/05/22 11:40                            Physical Exam  Vital signs reviewed and normal  General-inspection of the pleasant mildly overweight individual in no acute distress  ENT eyes are clear PERRLA bilaterally minimal tenderness of right maxillary sinus does with engorged Sirmans regular and left with yellow rhinorrhea minimal tenderness of right maxillary sinus inferiorly.  Both TMs are retracted scarred without hyperemia oral exam shows posterior injection only  Neck neck is supple no mass adenopathy bruits rigidity thyroid is normal  Cardiovascular-RSR without significant murmurs gallop or ectopy  Pulmonary-chest clear without wheezing  rales or rhonchi  Musculoskeletal some tenderness lateral hips especially the anterior medial aspect of both knees no redness warmth or effusion of the knees  Peripheral vascular no symmetric or asymmetric edema and no motor sensorivascular deficits  Mood mood is appropriately anxious but otherwise appropriate and otherwise no significant cognitive issues or depression  Reviewed earlier labs and the need for the improved diet.  Thyroid and lipid panel pending    Assessment/Plan   Problem List Items Addressed This Visit       Anxiety disorder   Above chronic medicines  Safety issues reviewed regarding her benzodiazepine  Template for benzodiazepine completed  Continue Xanax if needed and continue her above cholesterol medicine as well as hypertensive medicines  Continue low-salt low carbohydrate and low-fat diet  Recheck in 2 months after fasting CHEM and lipid panel call if interval problems otherwise continue healthy routines daily domestic stressors  @discharge  The above diagnosis and treatment plan was discussed with the patient patient will continue appropriate diet and exercise as reviewed  Patient will recheck earlier if any interval problems of significance or clinical worsening of the above problems.  Agrees above surveillance.  All question were addressed regarding above meds

## 2023-10-19 PROBLEM — R63.5 WEIGHT GAIN: Status: ACTIVE | Noted: 2023-10-19

## 2023-10-19 ASSESSMENT — ENCOUNTER SYMPTOMS
SHORTNESS OF BREATH: 0
LIGHT-HEADEDNESS: 0
PALPITATIONS: 0
SINUS PAIN: 1
VOICE CHANGE: 0
CONFUSION: 0
FATIGUE: 0
FEVER: 0
RHINORRHEA: 1
FREQUENCY: 0
NERVOUS/ANXIOUS: 1
ARTHRALGIAS: 1
DYSURIA: 0
HEMATURIA: 0
CHEST TIGHTNESS: 0
ABDOMINAL PAIN: 0
SLEEP DISTURBANCE: 1
MYALGIAS: 1
UNEXPECTED WEIGHT CHANGE: 1
COUGH: 0
SORE THROAT: 1
WEAKNESS: 0
HEADACHES: 0
BLOOD IN STOOL: 0

## 2023-11-07 NOTE — PROGRESS NOTES
How Severe Is It?: moderate Pt aware that order has been placed in chart. Please help arrange appointment for patient. Thank you.   Is This A New Presentation, Or A Follow-Up?: Hair Loss Additional History: Pt received cortisone shot at previous dermatologist in 2021, which improved alopecia. While on a trip to [place] in September, she was prescribed Clobetasol and minoxidil topicals. Was also given biofolic shampoo J[place].  Was also prescribed tacrolimus for dryness.\\nCurrently has an IUD implant which she plans to remove soon

## 2023-12-14 ENCOUNTER — OFFICE VISIT (OUTPATIENT)
Dept: ORTHOPEDIC SURGERY | Facility: CLINIC | Age: 68
End: 2023-12-14
Payer: MEDICARE

## 2023-12-14 DIAGNOSIS — M17.0 PRIMARY OSTEOARTHRITIS OF BOTH KNEES: Primary | ICD-10-CM

## 2023-12-14 PROCEDURE — 1160F RVW MEDS BY RX/DR IN RCRD: CPT | Performed by: INTERNAL MEDICINE

## 2023-12-14 PROCEDURE — 20610 DRAIN/INJ JOINT/BURSA W/O US: CPT | Performed by: INTERNAL MEDICINE

## 2023-12-14 PROCEDURE — 99213 OFFICE O/P EST LOW 20 MIN: CPT | Performed by: INTERNAL MEDICINE

## 2023-12-14 PROCEDURE — 1036F TOBACCO NON-USER: CPT | Performed by: INTERNAL MEDICINE

## 2023-12-14 PROCEDURE — 1159F MED LIST DOCD IN RCRD: CPT | Performed by: INTERNAL MEDICINE

## 2023-12-14 RX ORDER — BETAMETHASONE SODIUM PHOSPHATE AND BETAMETHASONE ACETATE 3; 3 MG/ML; MG/ML
3 INJECTION, SUSPENSION INTRA-ARTICULAR; INTRALESIONAL; INTRAMUSCULAR; SOFT TISSUE
Status: COMPLETED | OUTPATIENT
Start: 2023-12-14 | End: 2023-12-14

## 2023-12-14 RX ORDER — LIDOCAINE HYDROCHLORIDE 10 MG/ML
5 INJECTION INFILTRATION; PERINEURAL
Status: COMPLETED | OUTPATIENT
Start: 2023-12-14 | End: 2023-12-14

## 2023-12-14 RX ADMIN — LIDOCAINE HYDROCHLORIDE 5 ML: 10 INJECTION INFILTRATION; PERINEURAL at 09:08

## 2023-12-14 RX ADMIN — BETAMETHASONE SODIUM PHOSPHATE AND BETAMETHASONE ACETATE 3 ML: 3; 3 INJECTION, SUSPENSION INTRA-ARTICULAR; INTRALESIONAL; INTRAMUSCULAR; SOFT TISSUE at 09:08

## 2023-12-14 NOTE — PROGRESS NOTES
CC:   Chief Complaint   Patient presents with    Left Knee - Pain, Injections     Degenerative joint disease of both knees    Right Knee - Pain       HPI: Beth is a 68 y.o. female presents today with bilateral knee pain secondary  to known arthritis in both knees.  She had a cortisone injection in both knees 3 and half months ago, which gave her relief up until now.  She is not interested in any type of surgical options time such as knee replacement.  She is requesting repeat cortisone injection today.        Review of Systems   GENERAL: Negative for malaise, significant weight loss, fever  MUSCULOSKELETAL: See HPI  NEURO:  Negative for numbness / tingling     Past Medical History  Past Medical History:   Diagnosis Date    Acute maxillary sinusitis, unspecified 04/20/2022    Acute non-recurrent maxillary sinusitis    Acute non-recurrent maxillary sinusitis 02/13/2023    Chronic sinusitis, unspecified 12/16/2020    Sinobronchitis    Pain, unspecified     Pain    Personal history of other specified conditions 05/27/2015    History of abnormal mammogram       Medication review  Medication Documentation Review Audit       Reviewed by Mor Neri DO (Physician) on 10/16/23 at 0924      Medication Order Taking? Sig Documenting Provider Last Dose Status   ALPRAZolam (Xanax) 0.5 mg tablet [id number] Yes Take 1 tablet (0.5 mg) by mouth once daily. Mor Neri DO Taking Active   aspirin 81 mg EC tablet [id number] Yes Take 1 tablet (81 mg) by mouth once daily. Historical Provider, MD Taking Active   azithromycin (Zithromax) 250 mg tablet [id number]  Take 2 tabs (500 mg) by mouth today, than 1 daily for 4 days. Mor Neri DO  Active   calcium carb/D3/magnesium/zinc (calcium carb-D3-mag cmb11-zinc) [phone] mg-unit-mg-mg tablet [id number] Yes Take by mouth. Historical Provider, MD Taking Active   ergocalciferol (Vitamin D-2) 1.25 MG (32941 UT) capsule [id number] Yes Take 1 capsule (1,250 mcg) by mouth 1 (one) time  per week. Mor Neri, DO Taking Active   ibuprofen 600 mg tablet [id number] Yes Take 1 tablet (600 mg) by mouth 2 times a day with meals. Mor Neri, DO Taking Active   meclizine (Antivert) 12.5 mg tablet [id number] Yes Take 1 tablet (12.5 mg) by mouth 3 times a day as needed for dizziness. Historical Provider, MD Taking Active   meloxicam (Mobic) 7.5 mg tablet [id number] Yes Take 1 tablet (7.5 mg) by mouth 2 times a day as needed for pain. Historical Provider, MD Taking Active   metoprolol succinate XL (Toprol-XL) 50 mg 24 hr tablet [id number] Yes TAKE 1 TABLET BY MOUTH EVERY DAY Mor Neri, DO Taking Active   simvastatin (Zocor) 40 mg tablet [id number] Yes TAKE 1 TABLET BY MOUTH EVERY DAY Mor Neri, DO Taking Active                    Allergies  No Known Allergies    Social History  Social History     Socioeconomic History    Marital status:      Spouse name: Not on file    Number of children: Not on file    Years of education: Not on file    Highest education level: Not on file   Occupational History    Not on file   Tobacco Use    Smoking status: Never     Passive exposure: Past    Smokeless tobacco: Never   Substance and Sexual Activity    Alcohol use: Not Currently    Drug use: Never    Sexual activity: Not on file   Other Topics Concern    Not on file   Social History Narrative    Not on file     Social Determinants of Health     Financial Resource Strain: Not on file   Food Insecurity: Not on file   Transportation Needs: Not on file   Physical Activity: Not on file   Stress: Not on file   Social Connections: Not on file   Intimate Partner Violence: Not on file   Housing Stability: Not on file       Surgical History  Past Surgical History:   Procedure Laterality Date    BREAST BIOPSY  05/30/2015    Biopsy Breast Open    CHOLECYSTECTOMY  02/09/2015    Cholecystectomy    HYSTERECTOMY  02/09/2015    Hysterectomy    OTHER SURGICAL HISTORY  05/27/2015    Breast Biopsy During Breast Surgery        Physical Exam:  GENERAL:  Patient is awake, alert, and oriented to person place and time.  Patient appears well nourished and well kept.  Affect Calm, Not Acutely Distressed.  HEENT:  Normocephalic, Atraumatic, EOMI  CARDIOVASCULAR:  Hemodynamically stable.  RESPIRATORY:  Normal respirations with unlabored breathing.  Extremity: Right knee shows skin is intact.  1+ effusion.  She can flex right knee to 120 degrees and full extension at 0 degrees.  Negative valgus and varus stress test.  Pain over the medial and lateral joint line.  Patellar and quadricep mechanism intact.  There is no erythema or warmth.  There is no clinical sign infection.    Left knee shows 1+ effusion left knee.  She can flex left knee to 120 degrees and full extension at 0 degrees.  Negative valgus and varus stress test.  Pain of the medial and lateral joint line.  Patellar and quadricep mechanism intact.  There is no erythema warmth.  There is no clinical signs infection.      Diagnostics: X-rays reviewed        Procedure: L Inj/Asp: bilateral knee on 12/14/2023 9:08 AM  Indications: pain  Details: 22 G needle, lateral approach  Medications (Right): 3 mL betamethasone acet,sod phos 6 mg/mL; 5 mL lidocaine 10 mg/mL (1 %)  Medications (Left): 3 mL betamethasone acet,sod phos 6 mg/mL; 5 mL lidocaine 10 mg/mL (1 %)  Outcome: tolerated well, no immediate complications  Procedure, treatment alternatives, risks and benefits explained, specific risks discussed. Consent was given by the patient. Immediately prior to procedure a time out was called to verify the correct patient, procedure, equipment, support staff and site/side marked as required. Patient was prepped and draped in the usual sterile fashion.             Assessment: Bilateral knee pain secondary to knee osteoarthritis    Plan: Beth presents here for bowel knee pain secondary to known osteoarthritis in both knees.  She had a intra-articular corticosteroid injection almost about  3-1/2 Months ago, and she is requesting a repeat injection today.  She does not want a knee replacement this time.  Risk and benefits of the procedure discussed with the patient, she like to go forward with the procedure.  After injection she had improvement of her pain and symptoms.  She states like to follow-up as needed.  We discussed we could repeat the injection anytime after 3 to 4 months.    No orders of the defined types were placed in this encounter.     At the conclusion of the visit there were no further questions by the patient/family regarding their plan of care.  Patient was instructed to call or return with any issues, questions, or concerns regarding their injury and/or treatment plan described above.     12/14/23 at 9:05 AM - Siena Sky MD    Office: [phone]    This note was prepared using voice recognition software.  The details of this note are correct and have been reviewed, and corrected to the best of my ability.  Some grammatical errors may persist related to the Dragon software.

## 2024-01-03 ENCOUNTER — LAB (OUTPATIENT)
Dept: LAB | Facility: LAB | Age: 69
End: 2024-01-03
Payer: MEDICARE

## 2024-01-03 DIAGNOSIS — E78.5 DYSLIPIDEMIA: ICD-10-CM

## 2024-01-03 DIAGNOSIS — M17.0 PRIMARY OSTEOARTHRITIS OF BOTH KNEES: ICD-10-CM

## 2024-01-03 DIAGNOSIS — R63.5 WEIGHT GAIN: ICD-10-CM

## 2024-01-03 LAB
CHOLEST SERPL-MCNC: 248 MG/DL (ref 0–199)
CHOLESTEROL/HDL RATIO: 3.6
HDLC SERPL-MCNC: 69 MG/DL
LDLC SERPL CALC-MCNC: 151 MG/DL
NON HDL CHOLESTEROL: 179 MG/DL (ref 0–149)
TRIGL SERPL-MCNC: 142 MG/DL (ref 0–149)
TSH SERPL-ACNC: 2.04 MIU/L (ref 0.44–3.98)
VLDL: 28 MG/DL (ref 0–40)

## 2024-01-03 PROCEDURE — 80061 LIPID PANEL: CPT

## 2024-01-03 PROCEDURE — 84443 ASSAY THYROID STIM HORMONE: CPT

## 2024-01-03 PROCEDURE — 36415 COLL VENOUS BLD VENIPUNCTURE: CPT

## 2024-01-03 RX ORDER — IBUPROFEN 600 MG/1
600 TABLET ORAL
Qty: 60 TABLET | Refills: 3 | Status: SHIPPED | OUTPATIENT
Start: 2024-01-03 | End: 2024-01-08 | Stop reason: SDUPTHER

## 2024-01-08 ENCOUNTER — OFFICE VISIT (OUTPATIENT)
Dept: PRIMARY CARE | Facility: CLINIC | Age: 69
End: 2024-01-08
Payer: MEDICARE

## 2024-01-08 VITALS
HEART RATE: 74 BPM | HEIGHT: 62 IN | WEIGHT: 219 LBS | TEMPERATURE: 97.1 F | DIASTOLIC BLOOD PRESSURE: 72 MMHG | BODY MASS INDEX: 40.3 KG/M2 | RESPIRATION RATE: 16 BRPM | SYSTOLIC BLOOD PRESSURE: 116 MMHG | OXYGEN SATURATION: 98 %

## 2024-01-08 DIAGNOSIS — M17.0 PRIMARY OSTEOARTHRITIS OF BOTH KNEES: ICD-10-CM

## 2024-01-08 DIAGNOSIS — E78.5 HYPERLIPIDEMIA, UNSPECIFIED: ICD-10-CM

## 2024-01-08 DIAGNOSIS — F41.9 ANXIETY DISORDER, UNSPECIFIED TYPE: ICD-10-CM

## 2024-01-08 DIAGNOSIS — E55.9 VITAMIN D DEFICIENCY: ICD-10-CM

## 2024-01-08 DIAGNOSIS — I10 PRIMARY HYPERTENSION: ICD-10-CM

## 2024-01-08 DIAGNOSIS — I10 ESSENTIAL (PRIMARY) HYPERTENSION: ICD-10-CM

## 2024-01-08 DIAGNOSIS — H69.91 DYSFUNCTION OF RIGHT EUSTACHIAN TUBE: ICD-10-CM

## 2024-01-08 DIAGNOSIS — E78.5 DYSLIPIDEMIA: Primary | ICD-10-CM

## 2024-01-08 PROCEDURE — 3074F SYST BP LT 130 MM HG: CPT | Performed by: FAMILY MEDICINE

## 2024-01-08 PROCEDURE — 1160F RVW MEDS BY RX/DR IN RCRD: CPT | Performed by: FAMILY MEDICINE

## 2024-01-08 PROCEDURE — 1159F MED LIST DOCD IN RCRD: CPT | Performed by: FAMILY MEDICINE

## 2024-01-08 PROCEDURE — 3078F DIAST BP <80 MM HG: CPT | Performed by: FAMILY MEDICINE

## 2024-01-08 PROCEDURE — 99214 OFFICE O/P EST MOD 30 MIN: CPT | Performed by: FAMILY MEDICINE

## 2024-01-08 PROCEDURE — 1036F TOBACCO NON-USER: CPT | Performed by: FAMILY MEDICINE

## 2024-01-08 RX ORDER — SIMVASTATIN 40 MG/1
40 TABLET, FILM COATED ORAL DAILY
Qty: 90 TABLET | Refills: 3 | Status: CANCELLED | OUTPATIENT
Start: 2024-01-08

## 2024-01-08 RX ORDER — ATORVASTATIN CALCIUM 40 MG/1
40 TABLET, FILM COATED ORAL DAILY
Qty: 30 TABLET | Refills: 5 | Status: SHIPPED | OUTPATIENT
Start: 2024-01-08 | End: 2024-05-01

## 2024-01-08 RX ORDER — METOPROLOL SUCCINATE 50 MG/1
50 TABLET, EXTENDED RELEASE ORAL DAILY
Qty: 90 TABLET | Refills: 3 | Status: SHIPPED | OUTPATIENT
Start: 2024-01-08

## 2024-01-08 RX ORDER — ALPRAZOLAM 0.5 MG/1
0.5 TABLET ORAL DAILY
Qty: 30 TABLET | Refills: 0 | Status: SHIPPED | OUTPATIENT
Start: 2024-01-08 | End: 2024-04-21 | Stop reason: ALTCHOICE

## 2024-01-08 RX ORDER — IBUPROFEN 600 MG/1
600 TABLET ORAL
Qty: 180 TABLET | Refills: 3 | Status: SHIPPED | OUTPATIENT
Start: 2024-01-08

## 2024-01-08 RX ORDER — SIMVASTATIN 40 MG/1
TABLET, FILM COATED ORAL
Qty: 90 TABLET | Refills: 3 | OUTPATIENT
Start: 2024-01-08

## 2024-01-08 RX ORDER — MECLIZINE HCL 12.5 MG 12.5 MG/1
12.5 TABLET ORAL 3 TIMES DAILY PRN
Qty: 30 TABLET | Refills: 3 | Status: SHIPPED | OUTPATIENT
Start: 2024-01-08

## 2024-01-08 RX ORDER — ERGOCALCIFEROL 1.25 MG/1
1.25 CAPSULE ORAL
Qty: 13 CAPSULE | Refills: 3 | Status: SHIPPED | OUTPATIENT
Start: 2024-01-08

## 2024-01-08 NOTE — PROGRESS NOTES
Subjective   Patient ID: Beth Quinones is a 68 y.o. female who presents for Anxiety (3 month follow up ).  HPI  Pleasant 68-year-old is here to check different issues 1 of which is to recheck her Xanax as she takes in the evening or afternoon before she visits her  at the dementia rehab center she has had a lot of family stressors and this is definitely improved her anxiety and anxiety attacks.  Benefit still outweighs the risk    Safety is reviewed  OARRS reviewed  Benzodiazepine template completed  Patient is here to review her normal TSH but elevated total cholesterol yet at 248 and LDL at 151 on 40 of Zocor we did review switching her to a different statin therapy to see if this would improve the cholesterol panel.  Recent liver enzymes were normal as well as thyroid.  Does take metoprolol for hypertension Motrin for arthritis of the knees and vitamin D over-the-counter.  Has some nasal congestion with bilateral ear pressure but otherwise no high fever shaking chills.  Denies any recent chest pain short of breath palpitations or new GI- issues.  Chronic meds reviewed no reported side effects.  OARRS:  Mor Neri DO on 1/8/2024  8:44 AM  I have personally reviewed the OARRS report for Beth Quinones. I have considered the risks of abuse, dependence, addiction and diversion    Is the patient prescribed a combination of a benzodiazepine and opioid?  No    Last Urine Drug Screen / ordered today: Yes  Recent Results (from the past 8760 hour(s))   OPIATE/OPIOID/BENZO PRESCRIPTION COMPLIANCE    Collection Time: 04/14/23 10:19 AM   Result Value Ref Range    DRUG SCREEN COMMENT URINE SEE BELOW     Creatine, Urine 295.4 mg/dL    Amphetamine Screen, Urine PRESUMPTIVE NEGATIVE NEGATIVE    Barbiturate Screen, Urine PRESUMPTIVE NEGATIVE NEGATIVE    Cannabinoid Screen, Urine PRESUMPTIVE NEGATIVE NEGATIVE    Cocaine Screen, Urine PRESUMPTIVE NEGATIVE NEGATIVE    PCP Screen, Urine PRESUMPTIVE NEGATIVE NEGATIVE     7-Aminoclonazepam <25 Cutoff <25 ng/mL    Alpha-Hydroxyalprazolam 349 (A) Cutoff <25 ng/mL    Alpha-Hydroxymidazolam <25 Cutoff <25 ng/mL    Alprazolam 106 (A) Cutoff <25 ng/mL    Chlordiazepoxide <25 Cutoff <25 ng/mL    Clonazepam <25 Cutoff <25 ng/mL    Diazepam <25 Cutoff <25 ng/mL    Lorazepam <25 Cutoff <25 ng/mL    Midazolam <25 Cutoff <25 ng/mL    Nordiazepam <25 Cutoff <25 ng/mL    Oxazepam <25 Cutoff <25 ng/mL    Temazepam <25 Cutoff <25 ng/mL    Zolpidem <25 Cutoff <25 ng/mL    Zolpidem Metabolite (ZCA) <25 Cutoff <25 ng/mL    6-Acetylmorphine <25 Cutoff <25 ng/mL    Codeine <50 Cutoff <50 ng/mL    Hydrocodone <25 Cutoff <25 ng/mL    Hydromorphone <25 Cutoff <25 ng/mL    Morphine Urine <50 Cutoff <50 ng/mL    Norhydrocodone <25 Cutoff <25 ng/mL    Noroxycodone <25 Cutoff <25 ng/mL    Oxycodone <25 Cutoff <25 ng/mL    Oxymorphone <25 Cutoff <25 ng/mL    Tramadol <50 Cutoff <50 ng/mL    O-Desmethyltramadol <50 Cutoff <50 ng/mL    Fentanyl <2.5 Cutoff<2.5 ng/mL    Norfentanyl <2.5 Cutoff<2.5 ng/mL    METHADONE CONFIRMATION,URINE <25 Cutoff <25 ng/mL    EDDP <25 Cutoff <25 ng/mL     Results are as expected.         Controlled Substance Agreement:  Date of the Last Agreement: 04-  Reviewed Controlled Substance Agreement including but not limited to the benefits, risks, and alternatives to treatment with a Controlled Substance medication(s).    Benzodiazepines:  What is the patient's goal of therapy?  Daily  of generalized anxiety with her family history of disabilities and stressors.  Benefits always a risk and evidence abuse side effects or divergence  Is this being achieved with current treatment? yes    MARCELA-7:  No data recorded    Activities of Daily Living:   Is your overall impression that this patient is benefiting (symptom reduction outweighs side effects) from benzodiazepine therapy? Yes     1. Physical Functioning: Better  2. Family Relationship: Better  3. Social Relationship:  "Better  4. Mood: Better  5. Sleep Patterns: Better  6. Overall Function: Better  Review of Systems   Constitutional:  Negative for fatigue and fever.   HENT:  Positive for ear pain and rhinorrhea. Negative for sinus pressure, sinus pain and sore throat.    Eyes:  Negative for visual disturbance.   Respiratory:  Negative for cough, chest tightness and shortness of breath.    Cardiovascular:  Negative for chest pain, palpitations and leg swelling.   Gastrointestinal:  Negative for abdominal pain and blood in stool.   Genitourinary:  Negative for dysuria, frequency and hematuria.   Musculoskeletal:  Positive for arthralgias, joint swelling and myalgias.   Skin:  Negative for rash.   Neurological:  Negative for weakness, light-headedness and headaches.   Psychiatric/Behavioral:  Positive for sleep disturbance. Negative for behavioral problems, confusion, decreased concentration and suicidal ideas. The patient is nervous/anxious.        Objective   /72 (BP Location: Right arm, Patient Position: Sitting, BP Cuff Size: Large adult)   Pulse 74   Temp 36.2 °C (97.1 °F) (Temporal)   Resp 16   Ht 1.575 m (5' 2\")   Wt 99.3 kg (219 lb)   SpO2 98%   BMI 40.06 kg/m²         TSH  Order: [id number]  Status: Final result       Visible to patient: Yes (not seen)       Dx: Weight gain    0 Result Notes       Component  Ref Range & Units 5 d ago 2 yr ago   Thyroid Stimulating Hormone  0.44 - 3.98 mIU/L 2.04 1.93 CM   Resulting Agency [facility]              Narrative  Performed by: [facility]  TSH testing is performed using different testing methodology at [facility] than at other [facility] hospitals. Direct result comparisons should only be made within the same method.      Specimen Colle          Contains abnormal data Lipid panel  Order: [id number]  Status: Final result       Visible to patient: Yes (not seen)       Dx: Dyslipidemia    0 Result Notes       1  Topic      Component  Ref Range & Units 5 d " ago   Cholesterol  0 - 199 mg/dL 248 High    Comment:      Age      Desirable   Borderline High   High     0-19 Y     0 - 169       170 - 199     >/= 200    20-24 Y     0 - 189       190 - 224     >/= 225        >24 Y     0 - 199       200 - 239     >/= 240   **All ranges are based on fasting samples. Specific   therapeutic targets will vary based on patient-specific   cardiac risk.    Pediatric guidelines reference:Pediatrics 2011, 128(S5).Adult guidelines reference: NCEP ATPIII Guidelines,SHERITA 2001, 258:2486-97    Venipuncture immediately after or during the administration of Metamizole may lead to falsely low results. Testing should be performed immediately prior to Metamizole dosing.   HDL-Cholesterol  mg/dL 69.0   Comment:  Age       Very Low   Low     Normal    High    0-19 Y    < 35      < 40     40-45     ----  20-24 Y    ----     < 40      >45      ----        >24 Y      ----     < 40     40-60      >60   Cholesterol/HDL Ratio 3.6   Comment:  Ref Values  Desirable  < 3.4  High Risk  > 5.0   LDL Calculated  <=99 mg/dL 151 High    Comment:                            Near   Borderline      AGE      Desirable  Optimal    High     High     Very High     0-19 Y     0 - 109     ---    110-129   >/= 130     ----    20-24 Y     0 - 119     ---    120-159   >/= 160     ----      >24 Y     0 -  99   100-129  130-159   160-189     >/=190   VLDL  0 - 40 mg/dL 28   Triglycerides  0 - 149 mg/dL 142   Comment:   Age         Desirable   Borderline High   High     Very High   0 D-90 D    19 - 174              [facility]  Outside Information      suggestion  Information displayed in this report may not trend or trigger automated decision support.      Contains abnormal data COMP METABOLIC PANEL  Order: [id number]  Component  Ref Range & Units 9 mo ago   Protein, Total  6.3 - 8.0 g/dL 7.3   Albumin  3.9 - 4.9 g/dL 4.5   Calcium, Total  8.5 - 10.2 mg/dL 9.5   Bilirubin, Total  0.2 - 1.3 mg/dL 0.4   Alkaline Phosphatase  34  - 123 U/L 104   AST  13 - 35 U/L 21   ALT  7 - 38 U/L 13   Glucose  74 - 99 mg/dL 99   Comment: The American Diabetes Association (ADA) provides guidance for cutoff values for fasting glucose and random glucose. The ADA defines fasting as no caloric intake for at least 8 hours. Fasting plasma glucose results between 100 to 125 mg/dL indicate increased risk for diabetes (prediabetes).  Fasting plasma glucose results greater than or equal to 126 mg/dL meet the criteria for diagnosis of diabetes. In the absence of unequivocal hyperglycemia, results should be confirmed by repeat testing. In a patient with classic symptoms of hyperglycemia or hyperglycemic crisis, random plasma glucose results greater than or equal to 200 mg/dL meet the criteria for diagnosis of diabetes.  Reference: Standards of Medical Care in Diabetes 2016, American Diabetes Association. Diabetes Care. 2016.39(Suppl 1).   BUN  7 - 21 mg/dL 20   Creatinine  0.58 - 0.96 mg/dL 1.02 High    Sodium  136 - 144 mmol/L 140   Potassium  3.7 - 5.1 mmol/L 3.8   Chloride  97 - 105 mmol/L 103   CO2  22 - 30 mmol/L 28   Anion Gap  9 - 18 mmol/L 9   Estimated Glomerular Filtration Rate      Physical Exam  Vital signs reviewed and normal  General-inspection reveals a pleasant obese individual in no acute distress  ENT both TMs retracted with air-fluid level without hyperemia canals patent nose with some deviation with engorged turbinates clear rhinorrhea only oral exam shows posterior injection only  Neck neck is supple no mass adenopathy bruits or rigidity no JVD thyroid is normal  Chest chest is clear without wheezing rales or rhonchi  Cardiovascular RSR with very soft grade 1/2 over 6 systolic murmur at aortic area but no gallop or ectopy  Abdominal no organomegaly mass rebound mid upper abdomen no CVA tenderness  Musculoskeletal-tenderness reproduced over the medial aspect of both knees otherwise no redness warmth or effusion no tenderness to the upper  extremities or acute synovitis the upper and lower extremities  Peripheral vascular no symmetric asymmetric edema no motor sensorivascular deficit the lower extremities  Mood mood is stable slightly more relaxed without significant anxiety depressive or cognitive issues  Skin no rash petechiae or jaundice      Assessment/Plan   Problem List Items Addressed This Visit       Anxiety disorder    Dysfunction of right eustachian tube    Primary osteoarthritis of both knees    Vitamin D deficiency     Other Visit Diagnoses       Essential (primary) hypertension        Hyperlipidemia, unspecified            With elevated total cholesterol and LDL despite Zocor 40 mg, will switch to Lipitor 40 mg and improve low-fat diet we will check ALT and also LDL in 3 months  Continue Motrin if needed for joints  Template for benzodiazepine as well as OARRS completed we will continue Xanax in the before going to rehab to help her anxiety.  Continue low-salt low-fat diet  Meds reviewed will use a normal saline as well as Mucinex eustachian tube dysfunction call if interval worsening in the next 10 to 14 days  Otherwise seems less anxious as she does not have to worry about her  living at home with his dementia but more in a rehab at this time with the caregivers.  Call if interval problems  @discharge  The above diagnosis and treatment plan was discussed with the patient patient will continue appropriate diet and exercise as reviewed  Patient will recheck earlier if any interval problems of significance or clinical worsening of the above problems.  Agrees above surveillance.  All question were addressed regarding above meds

## 2024-01-11 PROBLEM — I10 ESSENTIAL (PRIMARY) HYPERTENSION: Status: ACTIVE | Noted: 2024-01-11

## 2024-01-11 ASSESSMENT — ENCOUNTER SYMPTOMS
PALPITATIONS: 0
SLEEP DISTURBANCE: 1
RHINORRHEA: 1
CONFUSION: 0
ABDOMINAL PAIN: 0
HEADACHES: 0
DYSURIA: 0
MYALGIAS: 1
BLOOD IN STOOL: 0
SINUS PAIN: 0
JOINT SWELLING: 1
SHORTNESS OF BREATH: 0
SORE THROAT: 0
HEMATURIA: 0
DECREASED CONCENTRATION: 0
SINUS PRESSURE: 0
FATIGUE: 0
ARTHRALGIAS: 1
CHEST TIGHTNESS: 0
COUGH: 0
NERVOUS/ANXIOUS: 1
FREQUENCY: 0
FEVER: 0
LIGHT-HEADEDNESS: 0
WEAKNESS: 0

## 2024-01-31 ENCOUNTER — OFFICE VISIT (OUTPATIENT)
Dept: PRIMARY CARE | Facility: CLINIC | Age: 69
End: 2024-01-31
Payer: MEDICARE

## 2024-01-31 VITALS
WEIGHT: 222 LBS | RESPIRATION RATE: 20 BRPM | SYSTOLIC BLOOD PRESSURE: 142 MMHG | TEMPERATURE: 97.9 F | HEART RATE: 84 BPM | OXYGEN SATURATION: 98 % | DIASTOLIC BLOOD PRESSURE: 88 MMHG | BODY MASS INDEX: 40.6 KG/M2

## 2024-01-31 DIAGNOSIS — B96.89 ACUTE BACTERIAL SINUSITIS: Primary | ICD-10-CM

## 2024-01-31 DIAGNOSIS — J01.90 ACUTE BACTERIAL SINUSITIS: Primary | ICD-10-CM

## 2024-01-31 PROCEDURE — 1036F TOBACCO NON-USER: CPT | Performed by: NURSE PRACTITIONER

## 2024-01-31 PROCEDURE — 99213 OFFICE O/P EST LOW 20 MIN: CPT | Performed by: NURSE PRACTITIONER

## 2024-01-31 PROCEDURE — 3077F SYST BP >= 140 MM HG: CPT | Performed by: NURSE PRACTITIONER

## 2024-01-31 PROCEDURE — 1159F MED LIST DOCD IN RCRD: CPT | Performed by: NURSE PRACTITIONER

## 2024-01-31 PROCEDURE — 3079F DIAST BP 80-89 MM HG: CPT | Performed by: NURSE PRACTITIONER

## 2024-01-31 PROCEDURE — 1160F RVW MEDS BY RX/DR IN RCRD: CPT | Performed by: NURSE PRACTITIONER

## 2024-01-31 RX ORDER — AMOXICILLIN AND CLAVULANATE POTASSIUM 875; 125 MG/1; MG/1
875 TABLET, FILM COATED ORAL 2 TIMES DAILY
Qty: 20 TABLET | Refills: 0 | Status: SHIPPED | OUTPATIENT
Start: 2024-01-31 | End: 2024-02-10

## 2024-01-31 RX ORDER — FLUTICASONE PROPIONATE 50 MCG
1 SPRAY, SUSPENSION (ML) NASAL DAILY
Qty: 16 G | Refills: 0 | Status: SHIPPED | OUTPATIENT
Start: 2024-01-31 | End: 2024-03-01

## 2024-01-31 ASSESSMENT — ENCOUNTER SYMPTOMS
FEVER: 0
NAUSEA: 0
WHEEZING: 0
HEADACHES: 0
CHEST TIGHTNESS: 0
FATIGUE: 0
MYALGIAS: 0
SINUS PRESSURE: 1
CHILLS: 0
ABDOMINAL PAIN: 0
SINUS PAIN: 1
DIARRHEA: 0
APPETITE CHANGE: 0
SORE THROAT: 0
SHORTNESS OF BREATH: 0
VOMITING: 0
COUGH: 0
RHINORRHEA: 1

## 2024-01-31 NOTE — PROGRESS NOTES
Subjective   Patient ID: Beth Quinones is a 68 y.o. female who presents for Sinusitis.    Patient's symptoms started on Saturday with sinus pressure, stuffy nose, post nasal drainage. Patient has green nasal drainage from her nose. Patient is vaccinated against COVID with the most recent vaccine. Pt took a COVID test on Sunday and it was negative. Pt declines need for COVID testing today. Pt took mucinex and it helped. Pt also used afrin.     Review of Systems   Constitutional:  Negative for appetite change, chills, fatigue and fever.   HENT:  Positive for congestion, postnasal drip, rhinorrhea, sinus pressure and sinus pain. Negative for sore throat.    Respiratory:  Negative for cough, chest tightness, shortness of breath and wheezing.    Cardiovascular:  Negative for chest pain.   Gastrointestinal:  Negative for abdominal pain, diarrhea, nausea and vomiting.   Musculoskeletal:  Negative for myalgias.   Neurological:  Negative for headaches.     Objective   /88   Pulse 84   Temp 36.6 °C (97.9 °F) (Temporal)   Resp 20   Wt 101 kg (222 lb)   SpO2 98%   BMI 40.60 kg/m²     Physical Exam  Vitals reviewed.   Constitutional:       General: She is not in acute distress.     Appearance: Normal appearance. She is not ill-appearing or toxic-appearing.   HENT:      Head: Atraumatic.      Right Ear: Tympanic membrane, ear canal and external ear normal.      Left Ear: Tympanic membrane, ear canal and external ear normal.      Nose: Congestion and rhinorrhea present.      Right Sinus: Frontal sinus tenderness present. No maxillary sinus tenderness.      Left Sinus: Frontal sinus tenderness present. No maxillary sinus tenderness.      Mouth/Throat:      Pharynx: Posterior oropharyngeal erythema present. No oropharyngeal exudate.   Eyes:      Conjunctiva/sclera: Conjunctivae normal.   Cardiovascular:      Rate and Rhythm: Normal rate and regular rhythm.      Heart sounds: Normal heart sounds. No murmur  heard.  Pulmonary:      Effort: Pulmonary effort is normal.      Breath sounds: Normal breath sounds. No wheezing or rhonchi.   Musculoskeletal:         General: Normal range of motion.   Skin:     General: Skin is warm and dry.   Neurological:      General: No focal deficit present.      Mental Status: She is alert.   Psychiatric:         Mood and Affect: Mood normal.         Behavior: Behavior normal.     Assessment/Plan   Problem List Items Addressed This Visit    None  Visit Diagnoses         Codes    Acute bacterial sinusitis    -  Primary J01.90, B96.89    Relevant Medications    fluticasone (Flonase) 50 mcg/actuation nasal spray    amoxicillin-pot clavulanate (Augmentin) 875-125 mg tablet        Patient started on augmentin and flonase as well. Discussed with patient that afrin is not to be taken for more than two days in a row; she verbalized understanding. Advised patient on use of humidifier and hot steam treatments. Discussed that patient is to drink plenty of fluids and stay well hydrated. Can take tylenol as needed for any fevers or discomfort. Discussed that patient is to go to the ER for any chest pain, difficulty breathing, shortness of breath or new/concerning symptoms; she agreed. Pt to follow up in 2-3 days if no better despite the use of the medications.

## 2024-02-02 LAB — FECAL OCCULT BLD IMMUNOASSAY EXXTERNAL: NEGATIVE

## 2024-04-12 ENCOUNTER — LAB (OUTPATIENT)
Dept: LAB | Facility: LAB | Age: 69
End: 2024-04-12
Payer: MEDICARE

## 2024-04-12 DIAGNOSIS — E78.5 DYSLIPIDEMIA: ICD-10-CM

## 2024-04-12 LAB
ALT SERPL W P-5'-P-CCNC: 15 U/L (ref 7–45)
LDLC SERPL DIRECT ASSAY-MCNC: 86 MG/DL (ref 0–129)

## 2024-04-12 PROCEDURE — 84460 ALANINE AMINO (ALT) (SGPT): CPT

## 2024-04-12 PROCEDURE — 83721 ASSAY OF BLOOD LIPOPROTEIN: CPT

## 2024-04-12 PROCEDURE — 36415 COLL VENOUS BLD VENIPUNCTURE: CPT

## 2024-04-16 ENCOUNTER — OFFICE VISIT (OUTPATIENT)
Dept: PRIMARY CARE | Facility: CLINIC | Age: 69
End: 2024-04-16
Payer: MEDICARE

## 2024-04-16 VITALS
DIASTOLIC BLOOD PRESSURE: 70 MMHG | HEIGHT: 62 IN | OXYGEN SATURATION: 97 % | TEMPERATURE: 96.9 F | WEIGHT: 220 LBS | HEART RATE: 71 BPM | SYSTOLIC BLOOD PRESSURE: 116 MMHG | BODY MASS INDEX: 40.48 KG/M2 | RESPIRATION RATE: 16 BRPM

## 2024-04-16 DIAGNOSIS — E55.9 VITAMIN D DEFICIENCY: ICD-10-CM

## 2024-04-16 DIAGNOSIS — Z00.00 ROUTINE GENERAL MEDICAL EXAMINATION AT HEALTH CARE FACILITY: ICD-10-CM

## 2024-04-16 DIAGNOSIS — M17.0 PRIMARY OSTEOARTHRITIS OF BOTH KNEES: ICD-10-CM

## 2024-04-16 DIAGNOSIS — E78.5 DYSLIPIDEMIA: ICD-10-CM

## 2024-04-16 DIAGNOSIS — I10 ESSENTIAL (PRIMARY) HYPERTENSION: ICD-10-CM

## 2024-04-16 DIAGNOSIS — Z00.00 MEDICARE ANNUAL WELLNESS VISIT, SUBSEQUENT: Primary | ICD-10-CM

## 2024-04-16 DIAGNOSIS — B96.89 ACUTE BACTERIAL SINUSITIS: ICD-10-CM

## 2024-04-16 DIAGNOSIS — J01.90 ACUTE BACTERIAL SINUSITIS: ICD-10-CM

## 2024-04-16 PROCEDURE — 99212 OFFICE O/P EST SF 10 MIN: CPT | Performed by: FAMILY MEDICINE

## 2024-04-16 PROCEDURE — 1160F RVW MEDS BY RX/DR IN RCRD: CPT | Performed by: FAMILY MEDICINE

## 2024-04-16 PROCEDURE — 3078F DIAST BP <80 MM HG: CPT | Performed by: FAMILY MEDICINE

## 2024-04-16 PROCEDURE — G0439 PPPS, SUBSEQ VISIT: HCPCS | Performed by: FAMILY MEDICINE

## 2024-04-16 PROCEDURE — 1159F MED LIST DOCD IN RCRD: CPT | Performed by: FAMILY MEDICINE

## 2024-04-16 PROCEDURE — 1170F FXNL STATUS ASSESSED: CPT | Performed by: FAMILY MEDICINE

## 2024-04-16 PROCEDURE — 1036F TOBACCO NON-USER: CPT | Performed by: FAMILY MEDICINE

## 2024-04-16 PROCEDURE — 3074F SYST BP LT 130 MM HG: CPT | Performed by: FAMILY MEDICINE

## 2024-04-16 RX ORDER — AMOXICILLIN 500 MG/1
500 CAPSULE ORAL 3 TIMES DAILY
Qty: 30 CAPSULE | Refills: 0 | Status: SHIPPED | OUTPATIENT
Start: 2024-04-16 | End: 2024-04-26

## 2024-04-16 ASSESSMENT — ACTIVITIES OF DAILY LIVING (ADL)
DOING_HOUSEWORK: INDEPENDENT
MANAGING_FINANCES: INDEPENDENT
DRESSING: INDEPENDENT
TAKING_MEDICATION: INDEPENDENT
GROCERY_SHOPPING: INDEPENDENT
BATHING: INDEPENDENT

## 2024-04-16 ASSESSMENT — ENCOUNTER SYMPTOMS
DEPRESSION: 0
OCCASIONAL FEELINGS OF UNSTEADINESS: 0
LOSS OF SENSATION IN FEET: 0

## 2024-04-16 NOTE — PROGRESS NOTES
Subjective   Reason for Visit: Beth Quinones is an 68 y.o. female here for a Medicare Wellness visit.          Reviewed all medications by prescribing practitioner or clinical pharmacist (such as prescriptions, OTCs, herbal therapies and supplements) and documented in the medical record.    HPI  1-Alta/Enrrique--- you have these people for her durable power   68-year-old female here for Medicare most review  Past medical history positive for anxiety but is totally off Xanax and she no longer has her  under room,  but he is in rehab  Does take Lipitor at nighttime for dyslipidemia and metoprolol and milligrams in the morning for hypertension  Patient remains active without exertional or resting chest pain short of breath or palpitations no GI/ issues.  No change in SBE and advised mammography later this autumn  Otherwise mood has been stable.  Unemployed because of visitation to her  rehab -otherwise mood as well as the history is negative for acute process.  Only new problem is a sinus pressure and mild headache of the last week.  Despite over-the-counter Tylenol and decongestants her sinus pressure-pain continues  Otherwise meds reviewed.  No reported side effects  Patient Care Team:  Mor Neri DO as PCP - General  Mor Neri DO as PCP - [employer] PCP     Review of Systems   Constitutional:  Negative for fatigue, fever and unexpected weight change.   HENT:  Positive for rhinorrhea, sinus pressure and sinus pain. Negative for ear pain, sore throat and voice change.    Eyes:  Negative for visual disturbance.   Respiratory:  Negative for cough, chest tightness and shortness of breath.    Cardiovascular:  Negative for chest pain, palpitations and leg swelling.   Gastrointestinal:  Negative for abdominal pain and blood in stool.   Genitourinary:  Negative for dysuria, frequency and hematuria.   Musculoskeletal:  Positive for arthralgias. Negative for gait problem, joint  "swelling and myalgias.   Skin:  Negative for rash.   Neurological:  Positive for headaches. Negative for weakness, light-headedness and numbness.   Hematological:  Negative for adenopathy.   Psychiatric/Behavioral:  Negative for behavioral problems, confusion, sleep disturbance and suicidal ideas.        Objective   Vitals:  /70 (BP Location: Right arm, Patient Position: Sitting, BP Cuff Size: Large adult)   Pulse 71   Temp 36.1 °C (96.9 °F) (Temporal)   Resp 16   Ht 1.575 m (5' 2\")   Wt 99.8 kg (220 lb)   SpO2 97%   BMI 40.24 kg/m²     LDL cholesterol, direct  Order: [id number]   Status: Final result       Visible to patient: Yes (seen)       Dx: Dyslipidemia    2 Result Notes       1  Topic           Component  Ref Range & Units 9 d ago 1 yr ago 2 yr ago 4 yr ago   LDL, Direct  0 - 129 mg/dL 86 105   CM   Resulting Agency          [facility]  Order: [id number]   Status: Final result       Visible to patient: Yes (seen)       Dx: Dyslipidemia    2 Result Notes         Component  Ref Range & Units 9 d ago 1 yr ago 2 yr ago 4 yr ago   [facility]  7 - 45 U/L 15 15 CM 17 CM 18 CM   Comment: Patients treated with Sulfasalazine may generate falsely decreased results for ALT.   Resulting Agency          [facility]  Order: [id number]   Resulted 2/2/2024 19:06       Status: Edited Result - FINAL       Visible to patient: Yes (seen)    Specimen Information: Stool   0 Result Notes       1  Topic      Component    [facility] Blood Immunoassay External Negative               Last Resulted: 02/02/24 19:06                [employer] website [email or web address].    3. Patients with diagnosis of osteoporosis or at high risk for fracture  should have regular bone mineral density tests. For patients eligible for  Medicare, routine testing is allowed once every 2 years. The testing  frequency can be increased to one year for patients who have rapidly  progressing disease, those who are receiving or discontinuing " medical therapy  to restore bone mass or have additional risk factors.    Template code:  [id number]_DX_dxa    Unavailable  Narrative    EXAMINATION:  BONE DENSITOMETRY    4/7/2023 8:36 am    TECHNIQUE:  A bone density dual x-ray absorptiometry (DXA) scan was performed of the  lumbar spine and bilateral hips on a lunar advanced prodigy DEXA system.    COMPARISON:  None.    HISTORY:  ORDERING SYSTEM PROVIDED HISTORY: Loss of height, Premature menopause    Gender: F    Age: 66 y/o    FINDINGS:  LUMBAR SPINE: L1-L4    BMD: 1.485 g/cm2    T-score: 2.3    Z-score: 2.8    LEFT TOTAL HIP:    BMD: 1.144 g/cm2    T-score: 1.1    Z-score: 1.6    LEFT FEMORAL NECK:  BMD: 1.05 g/cm2  T-score: 0.1    Z-score: 1.0    FRAX:    Dictated by : ALBA RANGEL MD    This examination was interpreted and the report reviewed and  electronically signed by:  ALBA RANGEL MD on Mar 31 2023  4:10PM  EST  Narrative    * * *Final Report* * *    DATE OF EXAM: Mar 31 2023  3:20PM      VHX   5308  -  XR CERVICAL  2V AP/LAT  / ACCESSION #  [id number]    PROCEDURE REASON: Neck Stiffness        * * * * Physician Interpretation * * * *     CERVICAL SPINE X-RAYS    HISTORY:  CHRONIC NECK PAIN, NO INJURY.   Neck Stiffness    TECHNIQUE: 2 views of the cervical spine    COMPARISON:  None    RESULT:  Counting reference:  Craniocervical junction.    Multilevel degenerative change, moderate to severe at C5-C6 and mild  throughout the remainder of cervical spine.  No acute fracture or  subluxation.  No prevertebral soft tissue swelling.  There are  uncovertebral osteophytes noted at C4-C5 and C5-C6.  Exam End: --    Specimen Collected: 03/31/23 15:20 Last Resulted: 03/31/23 16:12   Received From: [facility]      Physical Exam  Vital signs reviewed and normal  ENT eyes clear PERRLA bilaterally nose shows mild septal deviation to the right with yellow rhinorrhea minimal tenderness right maxillary sinus air-fluid level and retraction the right TM  without redness left TM is unremarkable oral exam is benign  Neck neck supple no mass adenopathy bruits rigidity thyroid is normal  Chest-chest is clear anteriorly and posteriorly  Cardiovascular RSR without murmurs gallop or ectopy    Assessment/Plan   Problem List Items Addressed This Visit    None  It is okay to remain off of as needed Xanax at this time  Continue her statin therapy low-fat diet and continue low-salt diet as well as her metoprolol  Will recheck in about 3 to 4 months at which time we will update her mammography due this autumn.  DEXA scan was essentially unremarkable last year  Recent Hemoccult was negative as well no GI red flags no change in SBE but continue breast exam.  Continue antihistamine at night normal saline in the daytime will add Amoxil with recent sinusitis call if interval worsening otherwise follow-up in about 3 to 4 months  @discharge  The above diagnosis and treatment plan was discussed with the patient patient will continue appropriate diet and exercise as reviewed  Patient will recheck earlier if any interval problems of significance or clinical worsening of the above problems.  Agrees above surveillance.  All question were addressed regarding above meds

## 2024-04-18 ENCOUNTER — APPOINTMENT (OUTPATIENT)
Dept: ORTHOPEDIC SURGERY | Facility: CLINIC | Age: 69
End: 2024-04-18
Payer: MEDICARE

## 2024-04-21 PROBLEM — Z00.00 MEDICARE ANNUAL WELLNESS VISIT, SUBSEQUENT: Status: ACTIVE | Noted: 2024-04-21

## 2024-04-21 PROBLEM — B96.89 ACUTE BACTERIAL SINUSITIS: Status: ACTIVE | Noted: 2024-04-21

## 2024-04-21 PROBLEM — J01.90 ACUTE BACTERIAL SINUSITIS: Status: ACTIVE | Noted: 2024-04-21

## 2024-04-21 ASSESSMENT — ENCOUNTER SYMPTOMS
VOICE CHANGE: 0
RHINORRHEA: 1
LIGHT-HEADEDNESS: 0
FATIGUE: 0
FEVER: 0
SINUS PAIN: 1
SINUS PRESSURE: 1
ABDOMINAL PAIN: 0
ADENOPATHY: 0
SHORTNESS OF BREATH: 0
BLOOD IN STOOL: 0
PALPITATIONS: 0
WEAKNESS: 0
ARTHRALGIAS: 1
SLEEP DISTURBANCE: 0
SORE THROAT: 0
NUMBNESS: 0
DYSURIA: 0
MYALGIAS: 0
UNEXPECTED WEIGHT CHANGE: 0
CONFUSION: 0
CHEST TIGHTNESS: 0
HEMATURIA: 0
FREQUENCY: 0
COUGH: 0
JOINT SWELLING: 0
HEADACHES: 1

## 2024-05-01 DIAGNOSIS — E78.5 DYSLIPIDEMIA: ICD-10-CM

## 2024-05-01 RX ORDER — ATORVASTATIN CALCIUM 40 MG/1
40 TABLET, FILM COATED ORAL DAILY
Qty: 90 TABLET | Refills: 1 | Status: SHIPPED | OUTPATIENT
Start: 2024-05-01

## 2024-05-02 ENCOUNTER — OFFICE VISIT (OUTPATIENT)
Dept: ORTHOPEDIC SURGERY | Facility: CLINIC | Age: 69
End: 2024-05-02
Payer: MEDICARE

## 2024-05-02 DIAGNOSIS — M17.0 PRIMARY OSTEOARTHRITIS OF BOTH KNEES: Primary | ICD-10-CM

## 2024-05-02 PROCEDURE — 99214 OFFICE O/P EST MOD 30 MIN: CPT | Performed by: INTERNAL MEDICINE

## 2024-05-02 PROCEDURE — 1159F MED LIST DOCD IN RCRD: CPT | Performed by: INTERNAL MEDICINE

## 2024-05-02 PROCEDURE — 1160F RVW MEDS BY RX/DR IN RCRD: CPT | Performed by: INTERNAL MEDICINE

## 2024-05-02 PROCEDURE — 20610 DRAIN/INJ JOINT/BURSA W/O US: CPT | Performed by: INTERNAL MEDICINE

## 2024-05-02 RX ORDER — BETAMETHASONE SODIUM PHOSPHATE AND BETAMETHASONE ACETATE 3; 3 MG/ML; MG/ML
2 INJECTION, SUSPENSION INTRA-ARTICULAR; INTRALESIONAL; INTRAMUSCULAR; SOFT TISSUE
Status: COMPLETED | OUTPATIENT
Start: 2024-05-02 | End: 2024-05-02

## 2024-05-02 RX ORDER — LIDOCAINE HYDROCHLORIDE 10 MG/ML
5 INJECTION INFILTRATION; PERINEURAL
Status: COMPLETED | OUTPATIENT
Start: 2024-05-02 | End: 2024-05-02

## 2024-05-02 RX ADMIN — LIDOCAINE HYDROCHLORIDE 5 ML: 10 INJECTION INFILTRATION; PERINEURAL at 13:37

## 2024-05-02 RX ADMIN — BETAMETHASONE SODIUM PHOSPHATE AND BETAMETHASONE ACETATE 2 ML: 3; 3 INJECTION, SUSPENSION INTRA-ARTICULAR; INTRALESIONAL; INTRAMUSCULAR; SOFT TISSUE at 13:37

## 2024-05-02 NOTE — PROGRESS NOTES
CC:   No chief complaint on file.      HPI: Beth is a 68 y.o. female presents today for bilateral ultrasound-guided cortisone injections to both knees for known arthritis in both knees. She states that she had significant relief form the last cortisone injections in December 2023. She would dee to try gel injections in the future. She also complains of bilateral wrist pain and reports recent she feel like her hand is getting weaker.         Review of Systems   GENERAL: Negative for malaise, significant weight loss, fever  MUSCULOSKELETAL: See HPI  NEURO:  Negative for numbness / tingling     Past Medical History  Past Medical History:   Diagnosis Date    Acute maxillary sinusitis, unspecified 04/20/2022    Acute non-recurrent maxillary sinusitis    Acute non-recurrent maxillary sinusitis 02/13/2023    Chronic sinusitis, unspecified 12/16/2020    Sinobronchitis    Pain, unspecified     Pain    Personal history of other specified conditions 05/27/2015    History of abnormal mammogram       Medication review  Medication Documentation Review Audit       Reviewed by Mor Neri DO (Physician) on 04/21/24 at 1436      Medication Order Taking? Sig Documenting Provider Last Dose Status     Discontinued 04/21/24 1424   amoxicillin (Amoxil) 500 mg capsule [id number]  Take 1 capsule (500 mg) by mouth 3 times a day for 10 days. Mor Neri DO  Active   aspirin 81 mg EC tablet [id number] Yes Take 1 tablet (81 mg) by mouth once daily. Historical Provider, MD Taking Active   atorvastatin (Lipitor) 40 mg tablet [id number] Yes Take 1 tablet (40 mg) by mouth once daily. Mor Neri DO Taking Active   calcium carb/D3/magnesium/zinc (calcium carb-D3-mag cmb11-zinc) [phone] mg-unit-mg-mg tablet [id number] Yes Take by mouth. Historical Provider, MD Taking Active   ergocalciferol (Vitamin D-2) 1.25 MG (44971 UT) capsule [id number] Yes Take 1 capsule (1,250 mcg) by mouth 1 (one) time per week. Mor Neri DO Taking Active    fluticasone (Flonase) 50 mcg/actuation nasal spray [id number]  Administer 1 spray into each nostril once daily. Shake gently. Before first use, prime pump. After use, clean tip and replace cap. Grace Burger, APRN-CNP   24 2359   ibuprofen 600 mg tablet [id number] Yes Take 1 tablet (600 mg) by mouth 2 times a day with meals. Mor Neri, DO Taking Active   meclizine (Antivert) 12.5 mg tablet [id number] Yes Take 1 tablet (12.5 mg) by mouth 3 times a day as needed for dizziness. Mor Neri, DO Taking Active   metoprolol succinate XL (Toprol-XL) 50 mg 24 hr tablet [id number] Yes Take 1 tablet (50 mg) by mouth once daily. Do not crush or chew. Mor Neri, DO Taking Active                    Allergies  No Known Allergies    Social History  Social History     Socioeconomic History    Marital status:      Spouse name: Not on file    Number of children: Not on file    Years of education: Not on file    Highest education level: Not on file   Occupational History    Not on file   Tobacco Use    Smoking status: Never     Passive exposure: Past    Smokeless tobacco: Never   Substance and Sexual Activity    Alcohol use: Not Currently    Drug use: Never    Sexual activity: Not on file   Other Topics Concern    Not on file   Social History Narrative    Not on file     Social Determinants of Health     Financial Resource Strain: Not on file   Food Insecurity: Not on file   Transportation Needs: Not on file   Physical Activity: Not on file   Stress: Not on file   Social Connections: Not on file   Intimate Partner Violence: Not on file   Housing Stability: Not on file       Surgical History  Past Surgical History:   Procedure Laterality Date    BREAST BIOPSY  2015    Biopsy Breast Open    CHOLECYSTECTOMY  2015    Cholecystectomy    HYSTERECTOMY  2015    Hysterectomy    OTHER SURGICAL HISTORY  2015    Breast Biopsy During Breast Surgery       Physical Exam:  GENERAL:  Patient is  awake, alert, and oriented to person place and time.  Patient appears well nourished and well kept.  Affect Calm, Not Acutely Distressed.  HEENT:  Normocephalic, Atraumatic, EOMI  CARDIOVASCULAR:  Hemodynamically stable.  RESPIRATORY:  Normal respirations with unlabored breathing.  Extremity: Right knee shows skin is intact.  1+ effusion.  She can flex right knee to 120 degrees and full extension at 0 degrees.  Negative valgus and varus stress test.  Pain over the medial and lateral joint line.  Patellar and quadricep mechanism intact.  There is no erythema or warmth.  There is no clinical sign infection.     Left knee shows 1+ effusion left knee.  She can flex left knee to 120 degrees and full extension at 0 degrees.  Negative valgus and varus stress test.  Pain of the medial and lateral joint line.  Patellar and quadricep mechanism intact.  There is no erythema warmth.  There is no clinical signs infection.      Diagnostics: Knee x-rays reviewed        Procedure: L Inj/Asp: bilateral knee on 5/2/2024 1:37 PM  Indications: pain  Details: 22 G needle, lateral approach  Medications (Right): 2 mL betamethasone acet,sod phos 6 mg/mL; 5 mL lidocaine 10 mg/mL (1 %)  Medications (Left): 2 mL betamethasone acet,sod phos 6 mg/mL; 5 mL lidocaine 10 mg/mL (1 %)  Outcome: tolerated well, no immediate complications  Procedure, treatment alternatives, risks and benefits explained, specific risks discussed. Consent was given by the patient. Immediately prior to procedure a time out was called to verify the correct patient, procedure, equipment, support staff and site/side marked as required. Patient was prepped and draped in the usual sterile fashion.             Assessment: Bilateral knee osteoarthritis    Plan: Beth presents today for bilateral knee ultrasound-guided corticosteroid injections for knee osteoarthritis.  Risk and benefits of the procedure discussed with the patient, she was agreeable for the injection, felt  immediately after the injection today. She responded well to previous cortisone injections. We discussed possible future gel injections, she was agreeable to this. We will submit for the 3 series viscosupplementation injections for both knees. She will follow-up after approval of th gel injections.  Does have chronic wrist pain secondary to arthritis, will refer to the hand team for further evaluation and possible injections.  Wrist x-rays done at the [facility].    In conclusion, this patient has OA of the knee which is symptomatic causing significant morning stiffness lasting up to an hour with popping, clicking, grinding with ROM, which is worse with prolonged standing or going up/down stairs.  This affects functional activities and ADLs.  There is radiographic evidence of OA with joint space narrowing and marginal osteophyte formation.  This patient has also failed pharmacologic treatment for OA including OTC analgesics, antiinflammatory medications, as well as intraarticular corticosteroid injections.  For these reasons, I feel the patient is a candidate for viscosupplementation injections of the right knee.     In conclusion, this patient has OA of the knee which is symptomatic causing significant morning stiffness lasting up to an hour with popping, clicking, grinding with ROM, which is worse with prolonged standing or going up/down stairs.  This affects functional activities and ADLs.  There is radiographic evidence of OA with joint space narrowing and marginal osteophyte formation.  This patient has also failed pharmacologic treatment for OA including OTC analgesics, antiinflammatory medications, as well as intraarticular corticosteroid injections.  For these reasons, I feel the patient is a candidate for viscosupplementation injections of the left knee.     No orders of the defined types were placed in this encounter.     At the conclusion of the visit there were no further questions by the  patient/family regarding their plan of care.  Patient was instructed to call or return with any issues, questions, or concerns regarding their injury and/or treatment plan described above.     05/02/24 at 12:58 PM - Siena Sky MD  Scribe Attestation  By signing my name below, I, Gerson Tran, Scribe   attest that this documentation has been prepared under the direction and in the presence of Siena Sky MD.    Office: [phone]    This note was prepared using voice recognition software.  The details of this note are correct and have been reviewed, and corrected to the best of my ability.  Some grammatical errors may persist related to the Dragon software.

## 2024-05-13 ENCOUNTER — OFFICE VISIT (OUTPATIENT)
Dept: ORTHOPEDIC SURGERY | Facility: CLINIC | Age: 69
End: 2024-05-13
Payer: MEDICARE

## 2024-05-13 DIAGNOSIS — G56.01 CARPAL TUNNEL SYNDROME OF RIGHT WRIST: ICD-10-CM

## 2024-05-13 DIAGNOSIS — M18.11 PRIMARY OSTEOARTHRITIS OF FIRST CARPOMETACARPAL JOINT OF RIGHT HAND: ICD-10-CM

## 2024-05-13 PROCEDURE — 1125F AMNT PAIN NOTED PAIN PRSNT: CPT | Performed by: ORTHOPAEDIC SURGERY

## 2024-05-13 PROCEDURE — L3908 WHO COCK-UP NONMOLDE PRE OTS: HCPCS | Performed by: ORTHOPAEDIC SURGERY

## 2024-05-13 PROCEDURE — L3924 HFO WITHOUT JOINTS PRE OTS: HCPCS | Performed by: ORTHOPAEDIC SURGERY

## 2024-05-13 PROCEDURE — 20600 DRAIN/INJ JOINT/BURSA W/O US: CPT | Performed by: ORTHOPAEDIC SURGERY

## 2024-05-13 PROCEDURE — 20526 THER INJECTION CARP TUNNEL: CPT | Performed by: ORTHOPAEDIC SURGERY

## 2024-05-13 PROCEDURE — 1159F MED LIST DOCD IN RCRD: CPT | Performed by: ORTHOPAEDIC SURGERY

## 2024-05-13 PROCEDURE — 1036F TOBACCO NON-USER: CPT | Performed by: ORTHOPAEDIC SURGERY

## 2024-05-13 PROCEDURE — 99214 OFFICE O/P EST MOD 30 MIN: CPT | Performed by: ORTHOPAEDIC SURGERY

## 2024-05-13 PROCEDURE — 1160F RVW MEDS BY RX/DR IN RCRD: CPT | Performed by: ORTHOPAEDIC SURGERY

## 2024-05-13 RX ORDER — LIDOCAINE HYDROCHLORIDE 10 MG/ML
0.5 INJECTION INFILTRATION; PERINEURAL
Status: COMPLETED | OUTPATIENT
Start: 2024-05-13 | End: 2024-05-13

## 2024-05-13 RX ORDER — LIDOCAINE HYDROCHLORIDE 10 MG/ML
1 INJECTION INFILTRATION; PERINEURAL
Status: COMPLETED | OUTPATIENT
Start: 2024-05-13 | End: 2024-05-13

## 2024-05-13 RX ADMIN — LIDOCAINE HYDROCHLORIDE 1 ML: 10 INJECTION INFILTRATION; PERINEURAL at 09:48

## 2024-05-13 RX ADMIN — LIDOCAINE HYDROCHLORIDE 0.5 ML: 10 INJECTION INFILTRATION; PERINEURAL at 09:48

## 2024-05-13 ASSESSMENT — PAIN - FUNCTIONAL ASSESSMENT: PAIN_FUNCTIONAL_ASSESSMENT: 0-10

## 2024-05-13 ASSESSMENT — PAIN SCALES - GENERAL: PAINLEVEL_OUTOF10: 8

## 2024-05-13 NOTE — PROGRESS NOTES
History present illness: Patient presents today for evaluation of symptoms compatible with right thumb CMC arthritis and right carpal tunnel syndrome.  Right-hand-dominant.  No blood thinners.  History of hypertension.      Past medical history: The patient's past medical history, family history, social history, and review of systems were documented on the patient medical intake.  The updated data was reviewed in the electronic medical record.  History is negative except otherwise stated in history of present illness.        Physical examination:  General: Alert and oriented to person, place, and time.  No acute distress and breathing comfortably: Pleasant and cooperative with examination.  HEENT: Head is normocephalic and atraumatic.  Neck: Supple, no visible swelling.  Cardiovascular: No palpable tachycardia  Lungs: No audible wheezing or labored breathing  Abdomen: Nondistended.  Extremities: Evaluation of the right upper extremity finds the patient had palpable radial artery at the wrist with brisk capillary refill to all digits.  Patient has intact sensation to axillary radial median and ulnar nerves.  There are no open wounds.  There are no signs of infection.  There is no evidence of lymphedema or lymphatic streaking.  The patient has supple compartments to right arm forearm and hand.  Positive Tinel's over course of median nerve to right wrist.  Focal tenderness and deformity to right thumb at CMC joint.      Radiology: X-rays were reviewed from outlying facility showing arthritic changes right thumb CMC joint and right wrist STT joint.      Assessment: Right thumb CMC arthritis.  Right wrist STT arthritis.  Right carpal tunnel syndrome.      Plan: Treatment options were discussed.  Patient elects for steroid injection to right thumb CMC joint, right carpal tunnel, and bracing for the thumb during the day and the carpal tunnel at night.  Follow-up with me in 6 weeks.  No x-rays upon  return.        Procedure:              Hand / UE Inj/Asp: R carpal tunnel for carpal tunnel syndrome on 5/13/2024 9:48 AM  Indications: diagnostic and therapeutic  Details: 25 G needle, volar approach  Medications: 10 mg triamcinolone acetonide 10 mg/mL; 1 mL lidocaine 10 mg/mL (1 %)  Outcome: tolerated well, no immediate complications    Right Carpal Tunnel Injection: It was explained to the patient that the risks of a steroid injection include but are not limited to infection, local skin irritation, skin atrophy, calcification, continued pain and discomfort, elevated blood sugar, burning, failure to relieve pain, and possible late infection. The patient verbalized good insight and verbalized consent for the injection. It was further explained that the post-injection discomfort can be alleviated with additional medications, ice, elevation, and rest over the first 24 hours, and that these modalities are recommended.  After informed consent was provided, patient identification was confirmed, and allergies were verified, the patient was appropriately positioned. The site was marked and time-out performed.    Using aseptic technique, a solution containing 1 mL of 10 mg of Kenalog and 1 mL of 1% lidocaine without epinephrine was injected into the patient's right carpal tunnel. A 25-gauge needle was inserted just ulnar to the anatomic course of the palmaris longus tendon at the level of the distal wrist flexion crease. It was slowly advanced deep to the distal antebrachial fascia. The solution was then injected slowly, taking care to ensure that the patient experienced no paresthesias during the injection of the solution. After injection of the solution, the patient was asked to perform active flexion and extension of the digits and wrist to help disperse the solution. A band-aid was then placed at the injection site. The patient tolerated this right carpal tunnel injection well.      Procedure, treatment alternatives,  risks and benefits explained, specific risks discussed. Consent was given by the patient. Immediately prior to procedure a time out was called to verify the correct patient, procedure, equipment, support staff and site/side marked as required. Patient was prepped and draped in the usual sterile fashion.       Hand / UE Inj/Asp: R thumb CMC for osteoarthritis on 5/13/2024 9:48 AM  Indications: pain  Details: 25 G needle, dorsal approach  Medications: 5 mg triamcinolone acetonide 10 mg/mL; 0.5 mL lidocaine 10 mg/mL (1 %)  Outcome: tolerated well, no immediate complications    Right Thumb Carpometacarpal Joint Injection: It was explained to the patient that the risks of a steroid injection include but are not limited to infection, local skin irritation, skin atrophy, calcification, continued pain and discomfort, elevated blood sugar, burning, failure to relieve pain, and possible late infection. The patient verbalized good insight and verbalized consent for the injection. It was further explained that the postinjection discomfort can be alleviated with additional medications, ice, elevation, and rest over the first 24 hours, and that these modalities are recommended.    Using aseptic technique, a solution containing 0.5 cc of 5 mg of Kenalog and 0.5 mL of 1% lidocaine without epinephrine was injected intraarticularly to the right thumb carpometacarpal joint. Digital palpation was used to localize the CMC articulation about the dorsal radial aspect of the joint. Gentle traction was then imparted to the thumb distally and a 25-gauge needle was advanced through the skin, subcutaneous tissue and capsule. The injection was then administered and the patient tolerated the injection well. A band-aid was then placed. It should be noted that ethyl chloride spray was used to make the injection delivery more comfortable for the patient.  Procedure, treatment alternatives, risks and benefits explained, specific risks discussed.  Consent was given by the patient. Immediately prior to procedure a time out was called to verify the correct patient, procedure, equipment, support staff and site/side marked as required. Patient was prepped and draped in the usual sterile fashion.

## 2024-05-28 ENCOUNTER — OFFICE VISIT (OUTPATIENT)
Dept: ORTHOPEDIC SURGERY | Facility: CLINIC | Age: 69
End: 2024-05-28
Payer: MEDICARE

## 2024-05-28 DIAGNOSIS — M17.0 PRIMARY OSTEOARTHRITIS OF BOTH KNEES: Primary | ICD-10-CM

## 2024-05-28 PROCEDURE — 20610 DRAIN/INJ JOINT/BURSA W/O US: CPT | Performed by: INTERNAL MEDICINE

## 2024-05-28 PROCEDURE — 1159F MED LIST DOCD IN RCRD: CPT | Performed by: INTERNAL MEDICINE

## 2024-05-28 PROCEDURE — 1160F RVW MEDS BY RX/DR IN RCRD: CPT | Performed by: INTERNAL MEDICINE

## 2024-05-28 NOTE — PROGRESS NOTES
CC:   No chief complaint on file.      HPI: Beth is a 68 y.o. female presents today for her first bilateral knee Synvisc injections for osteoarthritis.          Review of Systems   GENERAL: Negative for malaise, significant weight loss, fever  MUSCULOSKELETAL: See HPI  NEURO:  Negative for numbness / tingling     Past Medical History  Past Medical History:   Diagnosis Date    Acute maxillary sinusitis, unspecified 2022    Acute non-recurrent maxillary sinusitis    Acute non-recurrent maxillary sinusitis 2023    Chronic sinusitis, unspecified 2020    Sinobronchitis    Pain, unspecified     Pain    Personal history of other specified conditions 2015    History of abnormal mammogram       Medication review  Medication Documentation Review Audit       Reviewed by Royce Guidry (Technologist) on 24 at 0942      Medication Order Taking? Sig Documenting Provider Last Dose Status   aspirin 81 mg EC tablet [id number] No Take 1 tablet (81 mg) by mouth once daily. Historical Provider, MD Taking Active   atorvastatin (Lipitor) 40 mg tablet [id number]  TAKE 1 TABLET BY MOUTH EVERY DAY Mor Neri DO  Active   calcium carb/D3/magnesium/zinc (calcium carb-D3-mag cmb11-zinc) [phone] mg-unit-mg-mg tablet [id number] No Take by mouth. Historical Provider, MD Taking Active   ergocalciferol (Vitamin D-2) 1.25 MG (51883 UT) capsule [id number] No Take 1 capsule (1,250 mcg) by mouth 1 (one) time per week. Mor Neri DO Taking Active   fluticasone (Flonase) 50 mcg/actuation nasal spray [id number]  Administer 1 spray into each nostril once daily. Shake gently. Before first use, prime pump. After use, clean tip and replace cap. Grace Burger, APRN-CNP   24 2488   ibuprofen 600 mg tablet [id number] No Take 1 tablet (600 mg) by mouth 2 times a day with meals. Mor Neri DO Taking Active   meclizine (Antivert) 12.5 mg tablet [id number] No Take 1 tablet (12.5 mg) by mouth 3 times a day  as needed for dizziness. Mor Neri, DO Taking Active   metoprolol succinate XL (Toprol-XL) 50 mg 24 hr tablet [id number] No Take 1 tablet (50 mg) by mouth once daily. Do not crush or chew. Mor Neri, DO Taking Active                    Allergies  No Known Allergies    Social History  Social History     Socioeconomic History    Marital status:      Spouse name: Not on file    Number of children: Not on file    Years of education: Not on file    Highest education level: Not on file   Occupational History    Not on file   Tobacco Use    Smoking status: Never     Passive exposure: Past    Smokeless tobacco: Never   Substance and Sexual Activity    Alcohol use: Not Currently    Drug use: Never    Sexual activity: Not on file   Other Topics Concern    Not on file   Social History Narrative    Not on file     Social Determinants of Health     Financial Resource Strain: Not on file   Food Insecurity: Not on file   Transportation Needs: Not on file   Physical Activity: Not on file   Stress: Not on file   Social Connections: Not on file   Intimate Partner Violence: Not on file   Housing Stability: Not on file       Surgical History  Past Surgical History:   Procedure Laterality Date    BREAST BIOPSY  05/30/2015    Biopsy Breast Open    CHOLECYSTECTOMY  02/09/2015    Cholecystectomy    HYSTERECTOMY  02/09/2015    Hysterectomy    OTHER SURGICAL HISTORY  05/27/2015    Breast Biopsy During Breast Surgery       Physical Exam:  GENERAL:  Patient is awake, alert, and oriented to person place and time.  Patient appears well nourished and well kept.  Affect Calm, Not Acutely Distressed.  HEENT:  Normocephalic, Atraumatic, EOMI  CARDIOVASCULAR:  Hemodynamically stable.  RESPIRATORY:  Normal respirations with unlabored breathing.  Extremity: Bilateral knee skin was intact. No signs of infection.       Diagnostics: None today        Procedure: L Inj/Asp: bilateral knee on 5/28/2024 12:22 PM  Indications: pain  Details: 22 G  needle, lateral approach  Medications (Right): 2 mL hylan 16 mg/2 mL  Medications (Left): 2 mL hylan 16 mg/2 mL  Outcome: tolerated well, no immediate complications  Procedure, treatment alternatives, risks and benefits explained, specific risks discussed. Consent was given by the patient. Immediately prior to procedure a time out was called to verify the correct patient, procedure, equipment, support staff and site/side marked as required. Patient was prepped and draped in the usual sterile fashion.             Assessment: Bilateral knee osteoarthritis    Plan: Beth presents today for her first bilateral knee Synvisc injections for osteoarthritis. She tolerated her first injections today. She will follow-up for second injections next week.     No orders of the defined types were placed in this encounter.     At the conclusion of the visit there were no further questions by the patient/family regarding their plan of care.  Patient was instructed to call or return with any issues, questions, or concerns regarding their injury and/or treatment plan described above.     05/28/24 at 11:11 AM - Siena Sky MD  Scribe Attestation  By signing my name below, I, Gerson Chelsea, Scribe   attest that this documentation has been prepared under the direction and in the presence of Siena Sky MD.    Office: [phone]    This note was prepared using voice recognition software.  The details of this note are correct and have been reviewed, and corrected to the best of my ability.  Some grammatical errors may persist related to the Dragon software.

## 2024-06-04 ENCOUNTER — OFFICE VISIT (OUTPATIENT)
Dept: ORTHOPEDIC SURGERY | Facility: CLINIC | Age: 69
End: 2024-06-04
Payer: MEDICARE

## 2024-06-04 DIAGNOSIS — M17.0 PRIMARY OSTEOARTHRITIS OF BOTH KNEES: Primary | ICD-10-CM

## 2024-06-04 PROCEDURE — 20610 DRAIN/INJ JOINT/BURSA W/O US: CPT | Performed by: INTERNAL MEDICINE

## 2024-06-04 NOTE — PROGRESS NOTES
CC:   No chief complaint on file.      HPI: Beth is a 68 y.o. female presents today for her second bilateral knee Synvisc injections for knee osteoarthritis.          Review of Systems   GENERAL: Negative for malaise, significant weight loss, fever  MUSCULOSKELETAL: See HPI  NEURO:  Negative for numbness / tingling     Past Medical History  Past Medical History:   Diagnosis Date    Acute maxillary sinusitis, unspecified 2022    Acute non-recurrent maxillary sinusitis    Acute non-recurrent maxillary sinusitis 2023    Chronic sinusitis, unspecified 2020    Sinobronchitis    Pain, unspecified     Pain    Personal history of other specified conditions 2015    History of abnormal mammogram       Medication review  Medication Documentation Review Audit       Reviewed by Royce Guidry (Technologist) on 24 at 0942      Medication Order Taking? Sig Documenting Provider Last Dose Status   aspirin 81 mg EC tablet [id number] No Take 1 tablet (81 mg) by mouth once daily. Historical Provider, MD Taking Active   atorvastatin (Lipitor) 40 mg tablet [id number]  TAKE 1 TABLET BY MOUTH EVERY DAY Mor Neri,   Active   calcium carb/D3/magnesium/zinc (calcium carb-D3-mag cmb11-zinc) [phone] mg-unit-mg-mg tablet [id number] No Take by mouth. Historical Provider, MD Taking Active   ergocalciferol (Vitamin D-2) 1.25 MG (66554 UT) capsule [id number] No Take 1 capsule (1,250 mcg) by mouth 1 (one) time per week. Mor Neri DO Taking Active   fluticasone (Flonase) 50 mcg/actuation nasal spray [id number]  Administer 1 spray into each nostril once daily. Shake gently. Before first use, prime pump. After use, clean tip and replace cap. Grace Burger, APRN-CNP   24 2359   ibuprofen 600 mg tablet [id number] No Take 1 tablet (600 mg) by mouth 2 times a day with meals. Mro Neri DO Taking Active   meclizine (Antivert) 12.5 mg tablet [id number] No Take 1 tablet (12.5 mg) by mouth 3 times a  day as needed for dizziness. Mor Neri, DO Taking Active   metoprolol succinate XL (Toprol-XL) 50 mg 24 hr tablet [id number] No Take 1 tablet (50 mg) by mouth once daily. Do not crush or chew. Mor Neri, DO Taking Active                    Allergies  No Known Allergies    Social History  Social History     Socioeconomic History    Marital status:      Spouse name: Not on file    Number of children: Not on file    Years of education: Not on file    Highest education level: Not on file   Occupational History    Not on file   Tobacco Use    Smoking status: Never     Passive exposure: Past    Smokeless tobacco: Never   Substance and Sexual Activity    Alcohol use: Not Currently    Drug use: Never    Sexual activity: Not on file   Other Topics Concern    Not on file   Social History Narrative    Not on file     Social Determinants of Health     Financial Resource Strain: Not on file   Food Insecurity: Not on file   Transportation Needs: Not on file   Physical Activity: Not on file   Stress: Not on file   Social Connections: Not on file   Intimate Partner Violence: Not on file   Housing Stability: Not on file       Surgical History  Past Surgical History:   Procedure Laterality Date    BREAST BIOPSY  05/30/2015    Biopsy Breast Open    CHOLECYSTECTOMY  02/09/2015    Cholecystectomy    HYSTERECTOMY  02/09/2015    Hysterectomy    OTHER SURGICAL HISTORY  05/27/2015    Breast Biopsy During Breast Surgery       Physical Exam:  GENERAL:  Patient is awake, alert, and oriented to person place and time.  Patient appears well nourished and well kept.  Affect Calm, Not Acutely Distressed.  HEENT:  Normocephalic, Atraumatic, EOMI  CARDIOVASCULAR:  Hemodynamically stable.  RESPIRATORY:  Normal respirations with unlabored breathing.  Extremity: Bilateral knee skin was intact. No signs of infection.       Diagnostics: None today        Procedure: L Inj/Asp: bilateral knee on 6/4/2024 12:43 PM  Indications: pain  Details:  22 G needle, lateral approach  Medications (Right): 2 mL hylan 16 mg/2 mL  Medications (Left): 2 mL hylan 16 mg/2 mL  Procedure, treatment alternatives, risks and benefits explained, specific risks discussed. Consent was given by the patient. Immediately prior to procedure a time out was called to verify the correct patient, procedure, equipment, support staff and site/side marked as required. Patient was prepped and draped in the usual sterile fashion.             Assessment:  Bilateral knee osteoarthritis     Plan: Beth presents today for her second bilateral knee Synvisc injections for knee osteoarthritis. She tolerated her second injections today. She will follow-up for her third injections next week.     No orders of the defined types were placed in this encounter.     At the conclusion of the visit there were no further questions by the patient/family regarding their plan of care.  Patient was instructed to call or return with any issues, questions, or concerns regarding their injury and/or treatment plan described above.     06/04/24 at 10:57 AM - Siena Sky MD  Scribe Attestation  By signing my name below, I, Gerson Chelsea, Scribe   attest that this documentation has been prepared under the direction and in the presence of Siena Sky MD.    Office: [phone]    This note was prepared using voice recognition software.  The details of this note are correct and have been reviewed, and corrected to the best of my ability.  Some grammatical errors may persist related to the Dragon software.

## 2024-06-11 ENCOUNTER — OFFICE VISIT (OUTPATIENT)
Dept: ORTHOPEDIC SURGERY | Facility: CLINIC | Age: 69
End: 2024-06-11
Payer: MEDICARE

## 2024-06-11 DIAGNOSIS — M17.0 PRIMARY OSTEOARTHRITIS OF BOTH KNEES: Primary | ICD-10-CM

## 2024-06-11 PROCEDURE — 20610 DRAIN/INJ JOINT/BURSA W/O US: CPT | Performed by: INTERNAL MEDICINE

## 2024-06-11 NOTE — PROGRESS NOTES
CC:   Chief Complaint   Patient presents with   • Left Knee - Injections     Pt presents today for synvisc#3 inj to B/L knee     • Right Knee - Injections       HPI: Beth is a 68 y.o. female presents today for third and final Synvisc injection to both knees.  She states she is feeling significant relief.  No new issues today.  Overall she is very pleased with the progress she made so far.        Review of Systems   GENERAL: Negative for malaise, significant weight loss, fever  MUSCULOSKELETAL: See HPI  NEURO:  Negative for numbness / tingling     Past Medical History  Past Medical History:   Diagnosis Date   • Acute maxillary sinusitis, unspecified 2022    Acute non-recurrent maxillary sinusitis   • Acute non-recurrent maxillary sinusitis 2023   • Chronic sinusitis, unspecified 2020    Sinobronchitis   • Pain, unspecified     Pain   • Personal history of other specified conditions 2015    History of abnormal mammogram       Medication review  Medication Documentation Review Audit       Reviewed by Royce Guidry (Technologist) on 24 at 0942      Medication Order Taking? Sig Documenting Provider Last Dose Status   aspirin 81 mg EC tablet [id number] No Take 1 tablet (81 mg) by mouth once daily. Historical Provider, MD Taking Active   atorvastatin (Lipitor) 40 mg tablet [id number]  TAKE 1 TABLET BY MOUTH EVERY DAY Mor Neri, DO  Active   calcium carb/D3/magnesium/zinc (calcium carb-D3-mag cmb11-zinc) [phone] mg-unit-mg-mg tablet [id number] No Take by mouth. Historical Provider, MD Taking Active   ergocalciferol (Vitamin D-2) 1.25 MG (18032 UT) capsule [id number] No Take 1 capsule (1,250 mcg) by mouth 1 (one) time per week. Mor Neri DO Taking Active   fluticasone (Flonase) 50 mcg/actuation nasal spray [id number]  Administer 1 spray into each nostril once daily. Shake gently. Before first use, prime pump. After use, clean tip and replace cap. Grace Burger, APRN-CNP    03/01/24 2359   ibuprofen 600 mg tablet [id number] No Take 1 tablet (600 mg) by mouth 2 times a day with meals. Mor Neri, DO Taking Active   meclizine (Antivert) 12.5 mg tablet [id number] No Take 1 tablet (12.5 mg) by mouth 3 times a day as needed for dizziness. Mor Neri, DO Taking Active   metoprolol succinate XL (Toprol-XL) 50 mg 24 hr tablet [id number] No Take 1 tablet (50 mg) by mouth once daily. Do not crush or chew. Mor Neri, DO Taking Active                    Allergies  No Known Allergies    Social History  Social History     Socioeconomic History   • Marital status:      Spouse name: Not on file   • Number of children: Not on file   • Years of education: Not on file   • Highest education level: Not on file   Occupational History   • Not on file   Tobacco Use   • Smoking status: Never     Passive exposure: Past   • Smokeless tobacco: Never   Substance and Sexual Activity   • Alcohol use: Not Currently   • Drug use: Never   • Sexual activity: Not on file   Other Topics Concern   • Not on file   Social History Narrative   • Not on file     Social Determinants of Health     Financial Resource Strain: Not on file   Food Insecurity: Not on file   Transportation Needs: Not on file   Physical Activity: Not on file   Stress: Not on file   Social Connections: Not on file   Intimate Partner Violence: Not on file   Housing Stability: Not on file       Surgical History  Past Surgical History:   Procedure Laterality Date   • BREAST BIOPSY  05/30/2015    Biopsy Breast Open   • CHOLECYSTECTOMY  02/09/2015    Cholecystectomy   • HYSTERECTOMY  02/09/2015    Hysterectomy   • OTHER SURGICAL HISTORY  05/27/2015    Breast Biopsy During Breast Surgery       Physical Exam:  GENERAL:  Patient is awake, alert, and oriented to person place and time.  Patient appears well nourished and well kept.  Affect Calm, Not Acutely Distressed.  HEENT:  Normocephalic, Atraumatic, EOMI  CARDIOVASCULAR:  Hemodynamically  stable.  RESPIRATORY:  Normal respirations with unlabored breathing.  Extremity: Bilateral knee shows skin is intact.  There is no erythema or warmth.  There is no clinical signs of infection.      Diagnostics: Previous x-rays reviewed        Procedure: L Inj/Asp: bilateral knee on 6/11/2024 11:15 AM  Indications: pain  Details: 22 G needle, lateral approach  Medications (Right): 2 mL hylan 16 mg/2 mL  Medications (Left): 2 mL hylan 16 mg/2 mL  Outcome: tolerated well, no immediate complications  Procedure, treatment alternatives, risks and benefits explained, specific risks discussed. Consent was given by the patient. Immediately prior to procedure a time out was called to verify the correct patient, procedure, equipment, support staff and site/side marked as required. Patient was prepped and draped in the usual sterile fashion.           Assessment: Bilateral knee osteoarthritis    Plan: Beth presents today for third and final Synvisc injection to both knees for known arthritis.  She is already getting significant relief from the injections.  We discussed we could repeat injections after 6 months.  She like to follow-up as needed.    No orders of the defined types were placed in this encounter.     At the conclusion of the visit there were no further questions by the patient/family regarding their plan of care.  Patient was instructed to call or return with any issues, questions, or concerns regarding their injury and/or treatment plan described above.     06/11/24 at 10:58 AM - Siena Sky MD    Office: [phone]    This note was prepared using voice recognition software.  The details of this note are correct and have been reviewed, and corrected to the best of my ability.  Some grammatical errors may persist related to the Dragon software.

## 2024-07-08 ENCOUNTER — APPOINTMENT (OUTPATIENT)
Dept: ORTHOPEDIC SURGERY | Facility: CLINIC | Age: 69
End: 2024-07-08
Payer: MEDICARE

## 2024-07-08 DIAGNOSIS — M18.11 PRIMARY OSTEOARTHRITIS OF FIRST CARPOMETACARPAL JOINT OF RIGHT HAND: ICD-10-CM

## 2024-07-08 DIAGNOSIS — G56.01 CARPAL TUNNEL SYNDROME OF RIGHT WRIST: Primary | ICD-10-CM

## 2024-07-08 PROCEDURE — 1036F TOBACCO NON-USER: CPT | Performed by: ORTHOPAEDIC SURGERY

## 2024-07-08 PROCEDURE — 99213 OFFICE O/P EST LOW 20 MIN: CPT | Performed by: ORTHOPAEDIC SURGERY

## 2024-07-08 PROCEDURE — 1159F MED LIST DOCD IN RCRD: CPT | Performed by: ORTHOPAEDIC SURGERY

## 2024-07-08 NOTE — PROGRESS NOTES
"    7/8/2024    Chief Complaint   Patient presents with    Right Wrist - Follow-up     Right thumb CMC arthritis.  Right wrist STT arthritis.  Right carpal tunnel syndrome  S/p inj 5/13/24       History of Present Illness:  Patient Beth Quinones , 69 y.o. female, presents today, 7/8/2024, for evaluation of right hand and wrist pain, numbness, and weakness.  She is 6 weeks out from right carpal tunnel and right thumb CMC injection.  She states she is doing great after these.  She is \"90% better\".  She still has some intermittent numbness and tingling to the ring finger, some intermittent thumb pain with certain activities especially doing her dishes, but she states that overall things are much improved and she has been working on increasing her strength and endurance on her own.  She uses the braces as needed.  She was considering surgical intervention, but is doing so good for now she would like to hold off.       Review of Systems:   GENERAL: Negative  GI: Negative  MUSCULOSKELETAL: See HPI  SKIN: Negative  NEURO:  Negative     Physical Exam:  GENERAL:  Alert and oriented to person, place, and time.  No acute distress and breathing comfortably; pleasant and cooperative with the examination.  HEENT:  Head is normocephalic and atraumatic.  NECK:  Supple, no visible swelling.  CARDIOVASCULAR:  No palpable tachycardia.  LUNGS:  No audible wheezing or labored breathing.  ABDOMEN:  Nondistended.  Extremities: Evaluation of the right upper extremity finds the patient to have a palpable radial artery at the wrist with brisk capillary refill to all digits. The patient has intact sensorium to axillary, radial, median and ulnar nerves. There are no open wounds. There are no signs of infection. There is no evidence of lymphedema or lymphatic streaking. The patient has supple compartments of the right arm, forearm and hand.  Minimally positive Tinel's over median nerve on exam today.  Minimal tenderness palpation over the " right thumb CMC and STT articulations but improved from last visit.     Imaging/Test Results:  None today.     Assessment:  Right carpal tunnel syndrome and right thumb CMC arthritis improved with Kenalog injections, 6 weeks out.     Plan:  Treatment options were reviewed including operative and nonoperative strategies.  Patient strong preference for continued nonoperative management at this time.  We continued to discuss use of brace at home as needed for comfort and activities.  We discussed possibility of repeat injections.  We discussed that this would be on a every 3 months or longer schedule as needed for pain.  For now she elects to follow-up again in 6 weeks for repeat clinical exam if symptoms return and she would like another round of injections, if she is still doing well at that time she will cancel and follow-up with our office as an as-needed basis if symptoms dictate.  We do anticipate return in the future however.  We will likely get repeat x-rays, 3 views of the right thumb at next visit as the ones in the  PACS system are over 1-year-old currently.  All questions answered at today's visit.    In a face to face encounter, I performed a history and physical examination, discussed pertinent diagnostic studies if indicated, and discussed diagnosis and management strategies with both the patient and the mid-level provider. I reviewed the mid-level's note and agree with the documented findings and plan of care.  Patient presents today for evaluation of symptomatic right carpal tunnel syndrome and right thumb CMC arthritis.  Patient is substantially improved status post steroid injection to both.  She is not completely asymptomatic nearly so.  Minimal Tinel's over course of median nerve to right wrist.  Minimal tenderness to right thumb at CMC articulation.  Treatment options were discussed.  Patient elects for 6-week follow-up.  No x-rays upon return.

## 2024-07-16 ENCOUNTER — APPOINTMENT (OUTPATIENT)
Dept: PRIMARY CARE | Facility: CLINIC | Age: 69
End: 2024-07-16
Payer: MEDICARE

## 2024-07-16 VITALS
RESPIRATION RATE: 16 BRPM | WEIGHT: 212 LBS | DIASTOLIC BLOOD PRESSURE: 80 MMHG | OXYGEN SATURATION: 97 % | BODY MASS INDEX: 39.01 KG/M2 | SYSTOLIC BLOOD PRESSURE: 118 MMHG | TEMPERATURE: 96.4 F | HEART RATE: 65 BPM | HEIGHT: 62 IN

## 2024-07-16 DIAGNOSIS — I10 PRIMARY HYPERTENSION: Primary | ICD-10-CM

## 2024-07-16 DIAGNOSIS — E78.5 DYSLIPIDEMIA: ICD-10-CM

## 2024-07-16 DIAGNOSIS — F43.22 ADJUSTMENT DISORDER WITH ANXIOUS MOOD: ICD-10-CM

## 2024-07-16 DIAGNOSIS — J01.00 ACUTE NON-RECURRENT MAXILLARY SINUSITIS: ICD-10-CM

## 2024-07-16 PROCEDURE — 3074F SYST BP LT 130 MM HG: CPT | Performed by: FAMILY MEDICINE

## 2024-07-16 PROCEDURE — 1160F RVW MEDS BY RX/DR IN RCRD: CPT | Performed by: FAMILY MEDICINE

## 2024-07-16 PROCEDURE — 3008F BODY MASS INDEX DOCD: CPT | Performed by: FAMILY MEDICINE

## 2024-07-16 PROCEDURE — 1159F MED LIST DOCD IN RCRD: CPT | Performed by: FAMILY MEDICINE

## 2024-07-16 PROCEDURE — 3079F DIAST BP 80-89 MM HG: CPT | Performed by: FAMILY MEDICINE

## 2024-07-16 PROCEDURE — 1123F ACP DISCUSS/DSCN MKR DOCD: CPT | Performed by: FAMILY MEDICINE

## 2024-07-16 PROCEDURE — 99213 OFFICE O/P EST LOW 20 MIN: CPT | Performed by: FAMILY MEDICINE

## 2024-07-16 RX ORDER — CEPHALEXIN 500 MG/1
500 CAPSULE ORAL 2 TIMES DAILY
Qty: 20 CAPSULE | Refills: 0 | Status: SHIPPED | OUTPATIENT
Start: 2024-07-16 | End: 2024-07-26

## 2024-07-16 RX ORDER — BUSPIRONE HYDROCHLORIDE 5 MG/1
5 TABLET ORAL DAILY PRN
Qty: 30 TABLET | Refills: 0 | Status: SHIPPED | OUTPATIENT
Start: 2024-07-16 | End: 2024-08-15

## 2024-07-16 NOTE — PROGRESS NOTES
"Subjective   Patient ID: Beth Quinones is a 69 y.o. female who presents for Hypertension (3 month follow up ) and Sinusitis.  HPI  69-year-old female with a history of dyslipidemia hypertension as well as anxiety here to recheck progress.  Overall she is off her benzodiazepine but request something to help periodic anxiety revolving around her 's disability and his Alzheimer's.  We did suggest BuSpar and to which she concurs  Just take the metoprolol for hypertension and Lipitor for dyslipidemia otherwise tries to follow a low-salt low fat diet fairly well  Patient Nuys any chest pain short of breath palpitations or new GI- issues.  Chronic meds reviewed.  No reported side effects.  Recent colon cancer screen was negative and did review her earlier normal LDL CMP TSH    Review of Systems   Constitutional:  Negative for fatigue and fever.   HENT:  Positive for rhinorrhea, sinus pressure and sinus pain.    Eyes:  Negative for visual disturbance.   Respiratory:  Negative for cough, chest tightness and shortness of breath.    Cardiovascular:  Negative for chest pain, palpitations and leg swelling.   Gastrointestinal:  Negative for abdominal pain and blood in stool.   Genitourinary:  Negative for dysuria, frequency and hematuria.   Musculoskeletal:  Positive for myalgias. Negative for arthralgias.   Skin:  Negative for rash.   Neurological:  Negative for weakness and headaches.   Psychiatric/Behavioral:  Positive for sleep disturbance. Negative for behavioral problems and suicidal ideas. The patient is nervous/anxious.        Objective   /80 (BP Location: Right arm, Patient Position: Sitting, BP Cuff Size: Large adult)   Pulse 65   Temp 35.8 °C (96.4 °F) (Temporal)   Resp 16   Ht 1.575 m (5' 2\")   Wt 96.2 kg (212 lb)   SpO2 97%   BMI 38.78 kg/m²         LDL cholesterol, direct  Order: [id number]   Status: Final result       Visible to patient: Yes (seen)       Dx: Dyslipidemia    2 Result Notes     "   1 HM Topic         Component  Ref Range & Units 3 mo ago 2 yr ago 3 yr ago 4 yr ago   LDL, Direct  0 - 129 mg/dL 86 105   CM   Resulting Agency          [facility]  Order: [id number]   Status: Final result       Visible to patient: Yes (seen)       Dx: Dyslipidemia    2 Result Notes         Component  Ref Range & Units 3 mo ago 2 yr ago 3 yr ago 4 yr ago   ALT  7 - 45 U/L 15 15 CM 17 CM 18 CM   Comme      Fecal Occult Blood Immunoassy  Order: [id number]   Resulted 2/2/2024 19:06       Status: Edited Result - FINAL       Visible to patient: Yes (seen)    Specimen Information: Stool   0 Result Notes       1 HM Topic      Component    Fecal Occult Blood Immunoassay External Negative                    TSH  Order: [id number]   Status: Final result       Visible to patient: Yes (seen)       Dx: Weight gain    2 Result Notes       Component  Ref Range & Units 6 mo ago 3 yr ago   Thyroid Stimulating Hormone  0.44 - 3.98 mIU/L 2.04 1.93 CM   Resulting Agency [facility]                Outside Information      suggestion  Information displayed in this report may not trend or trigger automated decision support.      Contains abnormal data COMP METABOLIC PANEL  Order: [id number]  Component  Ref Range & Units 1 yr ago   Protein, Total  6.3 - 8.0 g/dL 7.3   Albumin  3.9 - 4.9 g/dL 4.5   Calcium, Total  8.5 - 10.2 mg/dL 9.5   Bilirubin, Total  0.2 - 1.3 mg/dL 0.4   Alkaline Phosphatase  34 - 123 U/L 104   AST  13 - 35 U/L 21   ALT  7 - 38 U/L 13   Glucose  74 - 99 mg/dL 99   Comment: The American Diabetes Association (ADA) provides guidance for cutoff values for fasting glucose and random glucose. The ADA defines fasting as no caloric intake for at least 8 hours. Fasting plasma glucose results between 100 to 125 mg/dL indicate increased risk for diabetes (prediabetes).  Fasting plasma glucose results greater than or equal to 126 mg/dL meet the criteria for diagnosis of diabetes. In the absence of unequivocal  hyperglycemia, results should be confirmed by repeat testing. In a patient with classic symptoms of hyperglycemia or hyperglycemic crisis, random plasma glucose results greater than or equal to 200 mg/dL meet the criteria for diagnosis of diabetes.  Reference: Standards of Medical Care in Diabetes 2016, American Diabetes Association. Diabetes Care. 2016.39(Suppl 1).   BUN  7 - 21 mg/dL 20   Creatinine  0.58 - 0.96 mg/dL 1.02 High    Sodium  136 - 144 mmol/L 140   Potassium  3.7 - 5.1 mmol/L 3.8   Chloride  97 - 105 mmol/L 103   CO2  22 - 30 mmol/L 28   Anion Gap  9 - 18 mmol/L 9   Estimated Glomerular Filtration Rate  >=60 mL/min/1.73m² 60       Physical Exam  Vital sign review normal 8 pound weight loss the spring and summer  General-inspection reveals a pleasant mildly obese individual in no acute distress  ENT-oral exam is benign nose shows mild subluxation of the right with yellow rhinorrhea minimal tenderness of the right maxillary sinus and both TMs retracted with scarring with superior hyperemia.  Otherwise no mastoid tenderness canals are patent eyes are clear  Neck neck is supple no mass adenopathy bruits rigidity  Cardiovascular RSR without significant murmurs gallop or ectopy  Pulmonary chest clear without wheezing rales or rhonchi  Peripheral vascular no symmetric asymmetric edema distal pulses are intact without sensory deficits  Mood mood is stable without anxiety depressive or cognitive issues    Above labs reviewed which is stable      Assessment/Plan   Problem List Items Addressed This Visit    None  Done best historically on Keflex-- will continue nasal saline as well as antihistamine at night and add Keflex with of sinusitis  Will add back BuSpar at 10 mg 1/2-1 daily if needed for breakthrough anxiety with a lot of family stressors  Otherwise continue low-salt low-fat diet and above metoprolol Lipitor follow-up in 3 to 4 months.  Otherwise clinically stable from a cardiac  standpoint  @discharge  The above diagnosis and treatment plan was discussed with the patient patient will continue appropriate diet and exercise as reviewed  Patient will recheck earlier if any interval problems of significance or clinical worsening of the above problems.  Agrees above surveillance.  All question were addressed regarding above meds

## 2024-07-21 PROBLEM — F43.22 ADJUSTMENT DISORDER WITH ANXIOUS MOOD: Status: ACTIVE | Noted: 2024-07-21

## 2024-07-21 ASSESSMENT — ENCOUNTER SYMPTOMS
CHEST TIGHTNESS: 0
PALPITATIONS: 0
ARTHRALGIAS: 0
SLEEP DISTURBANCE: 1
DYSURIA: 0
FREQUENCY: 0
BLOOD IN STOOL: 0
MYALGIAS: 1
WEAKNESS: 0
COUGH: 0
SINUS PRESSURE: 1
RHINORRHEA: 1
SINUS PAIN: 1
FEVER: 0
HEMATURIA: 0
FATIGUE: 0
SHORTNESS OF BREATH: 0
HEADACHES: 0
ABDOMINAL PAIN: 0
NERVOUS/ANXIOUS: 1

## 2024-08-07 DIAGNOSIS — F43.22 ADJUSTMENT DISORDER WITH ANXIOUS MOOD: ICD-10-CM

## 2024-08-07 RX ORDER — BUSPIRONE HYDROCHLORIDE 5 MG/1
5 TABLET ORAL DAILY PRN
Qty: 90 TABLET | Refills: 1 | Status: SHIPPED | OUTPATIENT
Start: 2024-08-07 | End: 2025-02-03

## 2024-08-26 ENCOUNTER — APPOINTMENT (OUTPATIENT)
Dept: ORTHOPEDIC SURGERY | Facility: CLINIC | Age: 69
End: 2024-08-26
Payer: MEDICARE

## 2024-08-26 DIAGNOSIS — M18.11 PRIMARY OSTEOARTHRITIS OF FIRST CARPOMETACARPAL JOINT OF RIGHT HAND: ICD-10-CM

## 2024-08-26 DIAGNOSIS — G56.01 CARPAL TUNNEL SYNDROME OF RIGHT WRIST: Primary | ICD-10-CM

## 2024-08-26 PROCEDURE — 99214 OFFICE O/P EST MOD 30 MIN: CPT | Performed by: ORTHOPAEDIC SURGERY

## 2024-08-26 PROCEDURE — 1159F MED LIST DOCD IN RCRD: CPT | Performed by: ORTHOPAEDIC SURGERY

## 2024-08-26 PROCEDURE — 1123F ACP DISCUSS/DSCN MKR DOCD: CPT | Performed by: ORTHOPAEDIC SURGERY

## 2024-08-26 PROCEDURE — 1036F TOBACCO NON-USER: CPT | Performed by: ORTHOPAEDIC SURGERY

## 2024-08-26 PROCEDURE — 20526 THER INJECTION CARP TUNNEL: CPT | Performed by: ORTHOPAEDIC SURGERY

## 2024-08-26 PROCEDURE — 20600 DRAIN/INJ JOINT/BURSA W/O US: CPT | Performed by: ORTHOPAEDIC SURGERY

## 2024-08-26 RX ORDER — LIDOCAINE HYDROCHLORIDE 10 MG/ML
1 INJECTION INFILTRATION; PERINEURAL
Status: COMPLETED | OUTPATIENT
Start: 2024-08-26 | End: 2024-08-26

## 2024-08-26 RX ORDER — LIDOCAINE HYDROCHLORIDE 10 MG/ML
0.5 INJECTION INFILTRATION; PERINEURAL
Status: COMPLETED | OUTPATIENT
Start: 2024-08-26 | End: 2024-08-26

## 2024-08-26 NOTE — PROGRESS NOTES
"    8/26/2024    Chief Complaint   Patient presents with    Right Hand - Follow-up       Right thumb CMC arthritis.  Right wrist STT arthritis.  Right carpal tunnel syndrome  S/p inj 5/13/24           History of Present Illness:  Patient Beth Quinones , 69 y.o. female, presents today, 8/26/2024, for evaluation of right hand pain and numbness.  Beth states she got great relief of numbness and tingling in the interim since injections.  She states just over the last week or 2 she has been appreciating increased difficulties with pinch and  and numbness to the median nerve distribution especially ring and long finger.  She states that the previous round of injections \"gave her her life back\" and made her more functional.  She cares for her  with dementia and 2 twin 2-year-olds at home.  She is considering surgery for permanent relief, but states now would not be the right time for this.       Review of Systems:   GENERAL: Negative  GI: Negative  MUSCULOSKELETAL: See HPI  SKIN: Negative  NEURO:  Negative     Physical Exam:  GENERAL:  Alert and oriented to person, place, and time.  No acute distress and breathing comfortably; pleasant and cooperative with the examination.  HEENT:  Head is normocephalic and atraumatic.  NECK:  Supple, no visible swelling.  CARDIOVASCULAR:  No palpable tachycardia.  LUNGS:  No audible wheezing or labored breathing.  ABDOMEN:  Nondistended.  Extremities: Evaluation of the right upper extremity finds the patient to have a palpable radial artery at the wrist with brisk capillary refill to all digits. The patient has intact sensorium to axillary, radial, median and ulnar nerves. There are no open wounds. There are no signs of infection. There is no evidence of lymphedema or lymphatic streaking. The patient has supple compartments of the right arm, forearm and hand.  Positive Tinel's over the median nerve of the right wrist.  Tender palpation over the right thumb CMC articulation " with positive basilar grind.       Imaging/Test Results:  None.     Assessment:  Right carpal tunnel syndrome.  Right thumb CMC arthritis.     Plan:  Treatment options including operative and nonoperative strategies were reviewed.  Patient strong preference for continued nonoperative management at this time.  She elects for repeat injection of the right thumb CMC and right carpal tunnel.  This was formed off today by Dr. Haskins and tolerated well by patient.  Follow-up with office again in 3 months for repeat clinical and radiographic exam, x-rays 3 views of right thumb upon return.  All questions answered at today's visit.    Hand / UE Inj/Asp: R carpal tunnel for carpal tunnel syndrome on 8/26/2024 8:54 AM  Indications: diagnostic and therapeutic  Details: 25 G needle, volar approach  Medications: 10 mg triamcinolone acetonide 10 mg/mL; 1 mL lidocaine 10 mg/mL (1 %)  Outcome: tolerated well, no immediate complications    Right Carpal Tunnel Injection: It was explained to the patient that the risks of a steroid injection include but are not limited to infection, local skin irritation, skin atrophy, calcification, continued pain and discomfort, elevated blood sugar, burning, failure to relieve pain, and possible late infection. The patient verbalized good insight and verbalized consent for the injection. It was further explained that the post-injection discomfort can be alleviated with additional medications, ice, elevation, and rest over the first 24 hours, and that these modalities are recommended.  After informed consent was provided, patient identification was confirmed, and allergies were verified, the patient was appropriately positioned. The site was marked and time-out performed.    Using aseptic technique, a solution containing 1 mL of 10 mg of Kenalog and 1 mL of 1% lidocaine without epinephrine was injected into the patient's right carpal tunnel. A 25-gauge needle was inserted just ulnar to the anatomic  course of the palmaris longus tendon at the level of the distal wrist flexion crease. It was slowly advanced deep to the distal antebrachial fascia. The solution was then injected slowly, taking care to ensure that the patient experienced no paresthesias during the injection of the solution. After injection of the solution, the patient was asked to perform active flexion and extension of the digits and wrist to help disperse the solution. A band-aid was then placed at the injection site. The patient tolerated this right carpal tunnel injection well.      Procedure, treatment alternatives, risks and benefits explained, specific risks discussed. Consent was given by the patient. Immediately prior to procedure a time out was called to verify the correct patient, procedure, equipment, support staff and site/side marked as required. Patient was prepped and draped in the usual sterile fashion.       Hand / UE Inj/Asp: R thumb CMC for osteoarthritis on 8/26/2024 8:54 AM  Indications: pain  Details: 25 G needle, dorsal approach  Medications: 5 mg triamcinolone acetonide 10 mg/mL; 0.5 mL lidocaine 10 mg/mL (1 %)  Outcome: tolerated well, no immediate complications    Right Thumb Carpometacarpal Joint Injection: It was explained to the patient that the risks of a steroid injection include but are not limited to infection, local skin irritation, skin atrophy, calcification, continued pain and discomfort, elevated blood sugar, burning, failure to relieve pain, and possible late infection. The patient verbalized good insight and verbalized consent for the injection. It was further explained that the postinjection discomfort can be alleviated with additional medications, ice, elevation, and rest over the first 24 hours, and that these modalities are recommended.    Using aseptic technique, a solution containing 0.5 cc of 5 mg of Kenalog and 0.5 mL of 1% lidocaine without epinephrine was injected intraarticularly to the right  thumb carpometacarpal joint. Digital palpation was used to localize the CMC articulation about the dorsal radial aspect of the joint. Gentle traction was then imparted to the thumb distally and a 25-gauge needle was advanced through the skin, subcutaneous tissue and capsule. The injection was then administered and the patient tolerated the injection well. A band-aid was then placed. It should be noted that ethyl chloride spray was used to make the injection delivery more comfortable for the patient.  Procedure, treatment alternatives, risks and benefits explained, specific risks discussed. Consent was given by the patient. Immediately prior to procedure a time out was called to verify the correct patient, procedure, equipment, support staff and site/side marked as required. Patient was prepped and draped in the usual sterile fashion.         In a face to face encounter, I performed a history and physical examination, discussed pertinent diagnostic studies if indicated, and discussed diagnosis and management strategies with both the patient and the mid-level provider. I reviewed the mid-level's note and agree with the documented findings and plan of care.  Patient presents today for evaluation of symptomatic right carpal tunnel syndrome and right thumb CMC arthritis.  Positive Tinel's over course of median nerve to right wrist.  Focal tenderness to right thumb at CMC articulation.  Treatment options were discussed.  We talked about operative and nonoperative strategies.  Patient elects for steroid injection to right thumb CMC joint into the right carpal tunnel.  She is considering surgical options but targeting early next year.  No x-rays upon return.

## 2024-08-29 NOTE — PROGRESS NOTES
Subjective   Patient ID: Beth Quinones is a 69 y.o. female who presents for annual exam. Pt complains of extreme vaginal dryness and pain in clitoris.  HPI  Pt presents for annual exam.  Pt states that having vaginal dryness and clitoral pain.  Has been using Vaseline and that helps a little.  Has leaking with laughing, coughing.  Doesn't do kegels.    Review of Systems  all other symptoms were found to be neg except for HPI/CC.      Objective   Physical Exam  Chest:   Breasts:     Right: Normal.      Left: Normal.       Constitutional:       Appearance: Normal appearance.   HENT:      Head: Normocephalic and atraumatic.      Nose: Nose normal.   Cardiovascular:      Rate and Rhythm: Normal rate and regular rhythm.   Pulmonary:      Effort: Pulmonary effort is normal.   Abdominal:      Palpations: Abdomen is soft.   Genitourinary:     General: Normal vulva.      Exam position: Lithotomy position.      Irineo stage (genital): 5.      Labia:         Right: No rash, tenderness, lesion or injury.         Left: No rash, tenderness, lesion or injury.       Urethra: No prolapse, urethral pain, urethral swelling or urethral lesion.      Vagina: Normal with atrophy, grade 1 bladder prolapse.       Adnexa: Right adnexa normal and left adnexa normal.        Right: No mass, tenderness or fullness.          Left: No mass, tenderness or fullness.     Musculoskeletal:         General: Normal range of motion.      Cervical back: Normal range of motion.   Skin:     General: Skin is warm and dry.   Neurological:      General: No focal deficit present.      Mental Status: She is alert.   Psychiatric:         Mood and Affect: Mood normal.         Behavior: Behavior normal.      Assessment/Plan   Diagnoses and all orders for this visit:  Breast screening  -     BI mammo bilateral screening tomosynthesis; Future  Encounter for screening mammogram for malignant neoplasm of breast  -     BI mammo bilateral screening tomosynthesis;  Future  Vaginal atrophy  -     estradiol (Estrace) 0.01 % (0.1 mg/gram) vaginal cream; Insert 0.25 Applicatorfuls (1 g) into the vagina 3 times a week.     Talked about vaginal atrophy, gave info, estrace cream  Explained kegels and how to do them.   Pt is to F/U in 1 yr for annual exam or PRN.  Pt is to call with any questions of concerns.   Gave mamm order    Carlene Zendejas, DO 08/29/24 5:32 PM

## 2024-08-30 ENCOUNTER — APPOINTMENT (OUTPATIENT)
Dept: OBSTETRICS AND GYNECOLOGY | Facility: CLINIC | Age: 69
End: 2024-08-30
Payer: MEDICARE

## 2024-08-30 VITALS
HEIGHT: 60 IN | DIASTOLIC BLOOD PRESSURE: 79 MMHG | BODY MASS INDEX: 41.74 KG/M2 | WEIGHT: 212.6 LBS | SYSTOLIC BLOOD PRESSURE: 128 MMHG

## 2024-08-30 DIAGNOSIS — Z12.39 BREAST SCREENING: Primary | ICD-10-CM

## 2024-08-30 DIAGNOSIS — Z01.419 WOMEN'S ANNUAL ROUTINE GYNECOLOGICAL EXAMINATION: ICD-10-CM

## 2024-08-30 DIAGNOSIS — N95.2 VAGINAL ATROPHY: ICD-10-CM

## 2024-08-30 DIAGNOSIS — Z12.31 ENCOUNTER FOR SCREENING MAMMOGRAM FOR MALIGNANT NEOPLASM OF BREAST: ICD-10-CM

## 2024-08-30 PROCEDURE — 3008F BODY MASS INDEX DOCD: CPT | Performed by: OBSTETRICS & GYNECOLOGY

## 2024-08-30 PROCEDURE — 1036F TOBACCO NON-USER: CPT | Performed by: OBSTETRICS & GYNECOLOGY

## 2024-08-30 PROCEDURE — 3074F SYST BP LT 130 MM HG: CPT | Performed by: OBSTETRICS & GYNECOLOGY

## 2024-08-30 PROCEDURE — 1123F ACP DISCUSS/DSCN MKR DOCD: CPT | Performed by: OBSTETRICS & GYNECOLOGY

## 2024-08-30 PROCEDURE — G0101 CA SCREEN;PELVIC/BREAST EXAM: HCPCS | Performed by: OBSTETRICS & GYNECOLOGY

## 2024-08-30 PROCEDURE — 1159F MED LIST DOCD IN RCRD: CPT | Performed by: OBSTETRICS & GYNECOLOGY

## 2024-08-30 PROCEDURE — 3078F DIAST BP <80 MM HG: CPT | Performed by: OBSTETRICS & GYNECOLOGY

## 2024-08-30 RX ORDER — ESTRADIOL 0.1 MG/G
1 CREAM VAGINAL 3 TIMES WEEKLY
Qty: 34 G | Refills: 5 | Status: SHIPPED | OUTPATIENT
Start: 2024-08-30 | End: 2025-08-30

## 2024-09-06 ENCOUNTER — HOSPITAL ENCOUNTER (OUTPATIENT)
Dept: RADIOLOGY | Facility: HOSPITAL | Age: 69
Discharge: HOME | End: 2024-09-06
Payer: MEDICARE

## 2024-09-06 DIAGNOSIS — Z12.39 BREAST SCREENING: ICD-10-CM

## 2024-09-06 DIAGNOSIS — Z12.31 ENCOUNTER FOR SCREENING MAMMOGRAM FOR MALIGNANT NEOPLASM OF BREAST: ICD-10-CM

## 2024-09-06 PROCEDURE — 77067 SCR MAMMO BI INCL CAD: CPT

## 2024-09-11 DIAGNOSIS — R92.8 ABNORMAL MAMMOGRAM OF RIGHT BREAST: Primary | ICD-10-CM

## 2024-09-25 ENCOUNTER — HOSPITAL ENCOUNTER (OUTPATIENT)
Dept: RADIOLOGY | Facility: HOSPITAL | Age: 69
Discharge: HOME | End: 2024-09-25
Payer: MEDICARE

## 2024-09-25 DIAGNOSIS — R92.8 ABNORMAL MAMMOGRAM OF RIGHT BREAST: ICD-10-CM

## 2024-09-25 PROCEDURE — 77061 BREAST TOMOSYNTHESIS UNI: CPT | Mod: RT

## 2024-09-25 PROCEDURE — G0279 TOMOSYNTHESIS, MAMMO: HCPCS | Mod: RIGHT SIDE | Performed by: RADIOLOGY

## 2024-09-25 PROCEDURE — 76642 ULTRASOUND BREAST LIMITED: CPT | Mod: RT

## 2024-09-25 PROCEDURE — 77065 DX MAMMO INCL CAD UNI: CPT | Mod: RIGHT SIDE | Performed by: RADIOLOGY

## 2024-09-25 PROCEDURE — 76642 ULTRASOUND BREAST LIMITED: CPT | Mod: RIGHT SIDE | Performed by: RADIOLOGY

## 2024-09-30 ENCOUNTER — APPOINTMENT (OUTPATIENT)
Dept: PRIMARY CARE | Facility: CLINIC | Age: 69
End: 2024-09-30
Payer: MEDICARE

## 2024-09-30 VITALS
SYSTOLIC BLOOD PRESSURE: 124 MMHG | HEIGHT: 60 IN | DIASTOLIC BLOOD PRESSURE: 80 MMHG | HEART RATE: 73 BPM | BODY MASS INDEX: 41.43 KG/M2 | RESPIRATION RATE: 16 BRPM | WEIGHT: 211 LBS | OXYGEN SATURATION: 94 % | TEMPERATURE: 96.9 F

## 2024-09-30 DIAGNOSIS — Z23 NEED FOR INFLUENZA VACCINATION: ICD-10-CM

## 2024-09-30 DIAGNOSIS — M17.0 PRIMARY OSTEOARTHRITIS OF BOTH KNEES: ICD-10-CM

## 2024-09-30 DIAGNOSIS — I10 ESSENTIAL (PRIMARY) HYPERTENSION: ICD-10-CM

## 2024-09-30 DIAGNOSIS — E55.9 VITAMIN D DEFICIENCY: ICD-10-CM

## 2024-09-30 DIAGNOSIS — F43.22 ADJUSTMENT DISORDER WITH ANXIOUS MOOD: ICD-10-CM

## 2024-09-30 DIAGNOSIS — E78.5 DYSLIPIDEMIA: ICD-10-CM

## 2024-09-30 PROCEDURE — 3079F DIAST BP 80-89 MM HG: CPT | Performed by: FAMILY MEDICINE

## 2024-09-30 PROCEDURE — 1160F RVW MEDS BY RX/DR IN RCRD: CPT | Performed by: FAMILY MEDICINE

## 2024-09-30 PROCEDURE — 3074F SYST BP LT 130 MM HG: CPT | Performed by: FAMILY MEDICINE

## 2024-09-30 PROCEDURE — G0008 ADMIN INFLUENZA VIRUS VAC: HCPCS | Performed by: FAMILY MEDICINE

## 2024-09-30 PROCEDURE — 90662 IIV NO PRSV INCREASED AG IM: CPT | Performed by: FAMILY MEDICINE

## 2024-09-30 PROCEDURE — 1123F ACP DISCUSS/DSCN MKR DOCD: CPT | Performed by: FAMILY MEDICINE

## 2024-09-30 PROCEDURE — 99213 OFFICE O/P EST LOW 20 MIN: CPT | Performed by: FAMILY MEDICINE

## 2024-09-30 PROCEDURE — 1159F MED LIST DOCD IN RCRD: CPT | Performed by: FAMILY MEDICINE

## 2024-09-30 PROCEDURE — 3008F BODY MASS INDEX DOCD: CPT | Performed by: FAMILY MEDICINE

## 2024-09-30 PROCEDURE — 1036F TOBACCO NON-USER: CPT | Performed by: FAMILY MEDICINE

## 2024-09-30 RX ORDER — BUSPIRONE HYDROCHLORIDE 5 MG/1
5 TABLET ORAL DAILY PRN
Qty: 90 TABLET | Refills: 1 | Status: SHIPPED | OUTPATIENT
Start: 2024-09-30 | End: 2025-03-29

## 2024-09-30 RX ORDER — METOPROLOL SUCCINATE 50 MG/1
50 TABLET, EXTENDED RELEASE ORAL DAILY
Qty: 90 TABLET | Refills: 3 | Status: SHIPPED | OUTPATIENT
Start: 2024-09-30

## 2024-09-30 RX ORDER — IBUPROFEN 600 MG/1
600 TABLET ORAL
Qty: 180 TABLET | Refills: 3 | Status: SHIPPED | OUTPATIENT
Start: 2024-09-30

## 2024-09-30 RX ORDER — ATORVASTATIN CALCIUM 40 MG/1
40 TABLET, FILM COATED ORAL DAILY
Qty: 90 TABLET | Refills: 1 | Status: SHIPPED | OUTPATIENT
Start: 2024-09-30

## 2024-09-30 RX ORDER — ERGOCALCIFEROL 1.25 MG/1
1.25 CAPSULE ORAL
Qty: 13 CAPSULE | Refills: 3 | Status: SHIPPED | OUTPATIENT
Start: 2024-09-30

## 2024-09-30 NOTE — PROGRESS NOTES
Subjective   Patient ID: Beth Quinones is a 69 y.o. female who presents for Hypertension (3 month follow up ).  HPI  Pleasant 69-year-old female who is here to recheck her dyslipidemia hypertension otherwise to go through fair amount of stress with her  in the rehab facility for his Alzheimer's dementia otherwise would like to renew her BuSpar which he takes periodically for anxiety but otherwise remains on her Lipitor 40 mg at bedtime and 50 mg of metoprolol in the morning no recent chest pain shortness with palpitation no new GI/ issues.  Try to follow a low-salt low-fat diet well  Chronic meds reviewed no reported side effects.  Does take supplemental multiple vitamins calcium and vitamin D  Review of Systems   Constitutional:  Negative for fatigue, fever and unexpected weight change.   Eyes:  Negative for visual disturbance.   Respiratory:  Negative for cough, chest tightness and shortness of breath.    Cardiovascular:  Negative for chest pain, palpitations and leg swelling.   Gastrointestinal:  Negative for abdominal pain and blood in stool.   Genitourinary:  Negative for dysuria, frequency and hematuria.   Musculoskeletal:  Positive for arthralgias and gait problem. Negative for myalgias.   Skin:  Negative for rash.   Neurological:  Negative for syncope, weakness, light-headedness, numbness and headaches.   Psychiatric/Behavioral:  Negative for behavioral problems, confusion, sleep disturbance and suicidal ideas. The patient is nervous/anxious.        Objective   /80 (BP Location: Left arm, Patient Position: Sitting, BP Cuff Size: Large adult)   Pulse 73   Temp 36.1 °C (96.9 °F) (Temporal)   Resp 16   Ht 1.524 m (5')   Wt 95.7 kg (211 lb)   SpO2 94%   BMI 41.21 kg/m²   LDL cholesterol, direct  Order: [id number]   Status: Final result       Visible to patient: Yes (seen)       Dx: Dyslipidemia    2 Result Notes       1  Topic         Component  Ref Range & Units 5 mo ago 2 yr ago 3 yr  ago 4 yr ago   LDL, Direct  0 - 129 mg/dL 86 105   CM   Resulting Agency [facility]              ALT  Order: [id number]   Status: Final result       Visible to patient: Yes (seen)       Dx: Dyslipidemia    2 Result Notes         Component  Ref Range & Units 5 mo ago 2 yr ago 3 yr ago 4 yr ago   ALT  7 - 45 U/L 15 15 CM 17 CM 18 CM   Comment: Patients treated with Sulfasalazine may generate falsely decreased results for ALT.   Resulting Agency [facility]              ecal Occult Blood Immunoassy  Order: [id number]   Resulted 2/2/2024 19:06       Status: Edited Result - FINAL       Visible to patient: Yes (seen)    Specimen Information: Stool   0 Result Notes       1  Topic      Component    Fecal Occult Blood Immunoassay External Negative                    TSH  Order: [id number]   Status: Final result       Visible to patient: Yes (seen)       Dx: Weight gain    2 Result Notes       Component  Ref Range & Units 9 mo ago 3 yr ago   Thyroid Stimulating Hormone  0.44 - 3.98 mIU/L 2.04 1.93 CM   Resulting Agency [facility]              Narrative       Outside Information      suggestion  Information displayed in this report may not trend or trigger automated decision support.     CBC + DIFF  Order: [id number]  Component  Ref Range & Units 1 yr ago   WBC  3.70 - 11.00 k/uL 6.18   RBC  3.90 - 5.20 m/uL 4.20   Hemoglobin  11.5 - 15.5 g/dL 13.1   Hematocrit  36.0 - 46.0 % 39.1   MCV  80.0 - 100.0 fL 93.1   MCH  26.0 - 34.0 pg 31.2   MCHC  30.5 - 36.0 g/dL 33.5   RDW-CV  11.5 - 15.0 % 12.2   Platelet Count  150 - 400 k/uL 280   MPV  9.0 - 12.7 fL 9.4   Neutrophils %  % 48.5   Abs Neut  1.45 - 7.50 k/uL 3.00   Lymphocytes %  % 39.0   Abs Lymph  1.00 - 4.00 k/uL 2.41   Monocytes %  % 5.7   Abs Mono  <0.87 k/uL 0.35   Eosinophils %  % 5.5   Abs Eosin  <0.46 k/uL 0.34   Basophils %  % 1.1   Abs Baso  <0.11 k/uL 0.07    Immature Granulocytes %  %                Physical Exam  Vital sign review normal pulse ox good  General-inspection reveals a more relaxed individual in no distress  Neck neck is supple no mass adenopathy bruits rigidity  Cardiovascular soft grade 1/2 over 6 is logics murmur otherwise no diastolic murmurs, gallop or ectopy  Pulmonary-chest clear without wheezing rales or rhonchi  Abdominal no organomegaly mass rebound or mid upper abdomen no CVA tenderness  Peripheral vascular no symmetric asymmetric edema no sensorimotor vascular deficits  Mood mood is stable less anxious but otherwise stable at this time no cognitive issues      Assessment/Plan   Problem List Items Addressed This Visit       Dyslipidemia    Primary osteoarthritis of both knees    Vitamin D deficiency    Essential (primary) hypertension    Adjustment disorder with anxious mood     Other Visit Diagnoses       Need for influenza vaccination            Stable lab/ liver on her current statin therapy will continue low-salt diet as well as her metoprolol and will add BuSpar if needed in the evening or daytime with her family related to anxiety  Will recheck in 4 months recheck earlier if interval problems flu vaccine updated refills were performed will check CMP right before the holidays.  Call if interval problems otherwise clinically stable  @discharge  The above diagnosis and treatment plan was discussed with the patient patient will continue appropriate diet and exercise as reviewed  Patient will recheck earlier if any interval problems of significance or clinical worsening of the above problems.  Agrees above surveillance.  All question were addressed regarding above meds

## 2024-10-03 ASSESSMENT — ENCOUNTER SYMPTOMS
SHORTNESS OF BREATH: 0
FEVER: 0
ARTHRALGIAS: 1
CONFUSION: 0
NERVOUS/ANXIOUS: 1
NUMBNESS: 0
CHEST TIGHTNESS: 0
COUGH: 0
MYALGIAS: 0
HEMATURIA: 0
FREQUENCY: 0
BLOOD IN STOOL: 0
WEAKNESS: 0
SLEEP DISTURBANCE: 0
DYSURIA: 0
HEADACHES: 0
PALPITATIONS: 0
FATIGUE: 0
ABDOMINAL PAIN: 0
LIGHT-HEADEDNESS: 0
UNEXPECTED WEIGHT CHANGE: 0

## 2024-11-25 ENCOUNTER — APPOINTMENT (OUTPATIENT)
Dept: ORTHOPEDIC SURGERY | Facility: CLINIC | Age: 69
End: 2024-11-25
Payer: MEDICARE

## 2024-11-25 ENCOUNTER — HOSPITAL ENCOUNTER (OUTPATIENT)
Dept: RADIOLOGY | Facility: HOSPITAL | Age: 69
Discharge: HOME | End: 2024-11-25
Payer: MEDICARE

## 2024-11-25 DIAGNOSIS — M18.11 PRIMARY OSTEOARTHRITIS OF FIRST CARPOMETACARPAL JOINT OF RIGHT HAND: ICD-10-CM

## 2024-11-25 DIAGNOSIS — M18.11 PRIMARY OSTEOARTHRITIS OF FIRST CARPOMETACARPAL JOINT OF RIGHT HAND: Primary | ICD-10-CM

## 2024-11-25 DIAGNOSIS — G56.01 CARPAL TUNNEL SYNDROME OF RIGHT WRIST: ICD-10-CM

## 2024-11-25 PROCEDURE — 1123F ACP DISCUSS/DSCN MKR DOCD: CPT | Performed by: ORTHOPAEDIC SURGERY

## 2024-11-25 PROCEDURE — 73140 X-RAY EXAM OF FINGER(S): CPT | Mod: RIGHT SIDE | Performed by: RADIOLOGY

## 2024-11-25 PROCEDURE — 73140 X-RAY EXAM OF FINGER(S): CPT | Mod: RT

## 2024-11-25 PROCEDURE — 99214 OFFICE O/P EST MOD 30 MIN: CPT | Performed by: ORTHOPAEDIC SURGERY

## 2024-11-25 NOTE — H&P (VIEW-ONLY)
11/25/2024    Chief Complaint   Patient presents with    Right Hand - Follow-up       Right thumb CMC arthritis.  Right wrist STT arthritis.  Right carpal tunnel syndrome  S/p inj 8/26/24  Xrays today           History of Present Illness:  Patient Beth Quinones , 69 y.o. female, presents today, 11/25/2024, for evaluation of right hand pain and numbness.  She describes numbness and tingling through median nerve distribution especially thumb index long and ring finger.  She got good relief from injection in August but symptoms did not return.  She is thoughtfully considering surgical intervention.  She also describes basilar thumb pain and difficulties with pinch and .  She denies any discrete injury or trauma.  At this point she is thoughtfully considering surgical interventions but is wondering what the downtime would be associated with each procedure.  Her biggest complaint is the numbness and tingling and would likely focus on this first.  She is a right-hand-dominant individual.  History of hyperlipidemia, hypertension, vitamin D deficiency, anxiety.  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       Review of Systems:   GENERAL: Negative  GI: Negative  MUSCULOSKELETAL: See HPI  SKIN: Negative  NEURO:  Negative     Physical Exam:  GENERAL:  Alert and oriented to person, place, and time.  No acute distress and breathing comfortably; pleasant and cooperative with the examination.  HEENT:  Head is normocephalic and atraumatic.  NECK:  Supple, no visible swelling.  CARDIOVASCULAR:  No palpable tachycardia.  LUNGS:  No audible wheezing or labored breathing.  ABDOMEN:  Nondistended.  Extremities: Evaluation of the right upper extremity finds the patient to have a palpable radial artery at the wrist with brisk capillary refill to all digits. The patient has intact sensorium to axillary, radial, median and ulnar nerves. There are no open wounds. There are no signs of infection. There is no  evidence of lymphedema or lymphatic streaking. The patient has supple compartments of the right arm, forearm and hand.  Positive Tinel's over the median nerve the right wrist.  Tender to palpation over right thumb CMC articulation.     Imaging/Test Results:  3 views of the right thumb show no acute fracture dislocation.  Significant arthritic changes noted at the CMC joint with loss of height, narrowing, osteophyte formation across the joint.      Assessment:   Right carpal tunnel syndrome recalcitrant to nonoperative treatment strategies.  Right thumb CMC arthritis.     Plan:  Treatment options were discussed including both operative and non-operative treatment strategies.  Patient elects to proceed forth with right carpal tunnel release under wide-awake approach to anesthesia.  We did discuss including concomitant CMC arthroplasty with possible tendon transfer but she declines today as she could not coordinate the downtime in her personal life.  Risks, benefits, and alternatives to surgery were discussed including, but not limited to infection risk, persistent or incomplete relief of pain, swelling, or stiffness, and post-operative pain and discomfort.  The patient verbalized agreement and understanding of the plan for care.  All questions answered at today's visit.  Plan for follow-up 10-14 days post-op.  She does not need to stop her baby aspirin prior to surgery.  We will likely consider right thumb CMC injection at postop visit.    In a face to face encounter, I performed a history and physical examination, discussed pertinent diagnostic studies if indicated, and discussed diagnosis and management strategies with both the patient and the mid-level provider. I reviewed the mid-level's note and agree with the documented findings and plan of care.  Patient presents today for evaluation of symptomatic right thumb CMC arthritis and right carpal tunnel syndrome.  Positive Tinel's of the course of median nerve to  right wrist.  Focal tenderness to right thumb at CMC articulation.  Treatment options were discussed including both operative and nonoperative strategies.  Patient elects for observation of right thumb CMC arthritis and a book for surgical release of the right carpal tunnel.  Plan for wide-awake approach to anesthesia.

## 2024-11-26 PROBLEM — G56.01 CARPAL TUNNEL SYNDROME, RIGHT UPPER LIMB: Status: ACTIVE | Noted: 2024-11-26

## 2024-11-27 NOTE — PREPROCEDURE INSTRUCTIONS

## 2024-11-29 ENCOUNTER — ANESTHESIA EVENT (OUTPATIENT)
Dept: OPERATING ROOM | Facility: HOSPITAL | Age: 69
End: 2024-11-29
Payer: MEDICARE

## 2024-12-02 ENCOUNTER — HOSPITAL ENCOUNTER (OUTPATIENT)
Facility: HOSPITAL | Age: 69
Setting detail: OUTPATIENT SURGERY
Discharge: HOME | End: 2024-12-02
Attending: ORTHOPAEDIC SURGERY | Admitting: ORTHOPAEDIC SURGERY
Payer: MEDICARE

## 2024-12-02 ENCOUNTER — ANESTHESIA (OUTPATIENT)
Dept: OPERATING ROOM | Facility: HOSPITAL | Age: 69
End: 2024-12-02
Payer: MEDICARE

## 2024-12-02 VITALS
SYSTOLIC BLOOD PRESSURE: 186 MMHG | HEART RATE: 63 BPM | BODY MASS INDEX: 39.67 KG/M2 | DIASTOLIC BLOOD PRESSURE: 91 MMHG | WEIGHT: 210.1 LBS | TEMPERATURE: 97.3 F | RESPIRATION RATE: 18 BRPM | OXYGEN SATURATION: 94 % | HEIGHT: 61 IN

## 2024-12-02 DIAGNOSIS — G89.18 POST-OP PAIN: Primary | ICD-10-CM

## 2024-12-02 DIAGNOSIS — G56.01 CARPAL TUNNEL SYNDROME, RIGHT UPPER LIMB: ICD-10-CM

## 2024-12-02 PROCEDURE — 3600000003 HC OR TIME - INITIAL BASE CHARGE - PROCEDURE LEVEL THREE: Performed by: ORTHOPAEDIC SURGERY

## 2024-12-02 PROCEDURE — 3700000002 HC GENERAL ANESTHESIA TIME - EACH INCREMENTAL 1 MINUTE: Performed by: ORTHOPAEDIC SURGERY

## 2024-12-02 PROCEDURE — 3600000008 HC OR TIME - EACH INCREMENTAL 1 MINUTE - PROCEDURE LEVEL THREE: Performed by: ORTHOPAEDIC SURGERY

## 2024-12-02 PROCEDURE — 64721 CARPAL TUNNEL SURGERY: CPT | Performed by: ORTHOPAEDIC SURGERY

## 2024-12-02 PROCEDURE — 2500000004 HC RX 250 GENERAL PHARMACY W/ HCPCS (ALT 636 FOR OP/ED): Performed by: ANESTHESIOLOGY

## 2024-12-02 PROCEDURE — 7100000009 HC PHASE TWO TIME - INITIAL BASE CHARGE: Performed by: ORTHOPAEDIC SURGERY

## 2024-12-02 PROCEDURE — 3700000001 HC GENERAL ANESTHESIA TIME - INITIAL BASE CHARGE: Performed by: ORTHOPAEDIC SURGERY

## 2024-12-02 PROCEDURE — 7100000010 HC PHASE TWO TIME - EACH INCREMENTAL 1 MINUTE: Performed by: ORTHOPAEDIC SURGERY

## 2024-12-02 RX ORDER — HYDROCODONE BITARTRATE AND ACETAMINOPHEN 5; 325 MG/1; MG/1
1 TABLET ORAL EVERY 8 HOURS PRN
Qty: 6 TABLET | Refills: 0 | Status: SHIPPED | OUTPATIENT
Start: 2024-12-02 | End: 2024-12-04

## 2024-12-02 SDOH — HEALTH STABILITY: MENTAL HEALTH: CURRENT SMOKER: 0

## 2024-12-02 ASSESSMENT — PAIN SCALES - GENERAL
PAINLEVEL_OUTOF10: 10 - WORST POSSIBLE PAIN
PAINLEVEL_OUTOF10: 0 - NO PAIN
PAIN_LEVEL: 0

## 2024-12-02 ASSESSMENT — PAIN - FUNCTIONAL ASSESSMENT
PAIN_FUNCTIONAL_ASSESSMENT: 0-10
PAIN_FUNCTIONAL_ASSESSMENT: 0-10

## 2024-12-02 ASSESSMENT — COLUMBIA-SUICIDE SEVERITY RATING SCALE - C-SSRS
6. HAVE YOU EVER DONE ANYTHING, STARTED TO DO ANYTHING, OR PREPARED TO DO ANYTHING TO END YOUR LIFE?: NO
2. HAVE YOU ACTUALLY HAD ANY THOUGHTS OF KILLING YOURSELF?: NO
1. IN THE PAST MONTH, HAVE YOU WISHED YOU WERE DEAD OR WISHED YOU COULD GO TO SLEEP AND NOT WAKE UP?: NO

## 2024-12-02 ASSESSMENT — PAIN DESCRIPTION - DESCRIPTORS: DESCRIPTORS: ACHING

## 2024-12-02 NOTE — ANESTHESIA PREPROCEDURE EVALUATION
Patient: Beth Quinones    Procedure Information       Date/Time: 12/02/24 0830    Procedure: RIGHT CARPAL TUNNEL RELEASE (Right: Wrist) - WIDE AWAKE BLOCK DONE BY ANESTHESIA    Location: [facility]    Surgeons: Carlos Haskins, DO            Relevant Problems   Cardiac   (+) Essential (primary) hypertension   (+) Hypertension      Neuro   (+) Anxiety disorder   (+) Carpal tunnel syndrome, right upper limb      Musculoskeletal   (+) Carpal tunnel syndrome, right upper limb   (+) Primary osteoarthritis of both knees      HEENT   (+) Acute bacterial sinusitis   (+) Acute non-recurrent maxillary sinusitis      ID   (+) Acute bacterial sinusitis       Clinical information reviewed:   Tobacco  Allergies  Meds  Problems  Med Hx  Surg Hx  OB Status    Fam Hx  Soc Hx        NPO Detail:  NPO/Void Status  Carbohydrate Drink Given Prior to Surgery? : N  Date of Last Liquid: 12/01/24  Time of Last Liquid: 2300  Date of Last Solid: 12/01/24  Time of Last Solid: 2100  Last Intake Type: Clear fluids; Food  Time of Last Void: 0540         Physical Exam    Airway  Mallampati: II  TM distance: >3 FB     Cardiovascular - normal exam     Dental    Pulmonary - normal exam     Abdominal - normal exam             Anesthesia Plan    History of general anesthesia?: yes  History of complications of general anesthesia?: no    ASA 3     regional     The patient is not a current smoker.  Patient did not smoke on day of procedure.    intravenous induction   Anesthetic plan and risks discussed with patient.    Plan discussed with CRNA and attending.

## 2024-12-02 NOTE — OP NOTE
RIGHT CARPAL TUNNEL RELEASE (R) Operative Note     Date: 2024  OR Location: [facility]    Name: Beth Quinones, : 1955, Age: 69 y.o., MRN: [id number], Sex: female        Preoperative diagnosis: Right carpal tunnel syndrome    Postoperative diagnosis: Right carpal tunnel syndrome    Procedure planned: Right carpal tunnel release    Procedure performed: Right carpal tunnel release    Surgeon: Carlos Haskins D.O.    Assistant:BEBETO Gaming  The physician assistant was present to the entire case. Given the nature of the disease process and the procedure to be performed a skilled surgical assistant was necessary during the case. The assistant was necessary in order to hold retractors and directly assist in the operation. A certified scrub tech was at the back table managing instruments and supplies for the surgical case.    Anesthesia: Local field block using lidocaine with epinephrine solution and monitored by the anesthesia team    Estimated blood loss: Less than 10 mL    Drains: None    Tourniquet: None    Specimens: None    Implants: None    Indications: The patient presented to the office with subjective symptoms physical examination an EMG nerve conduction study test consistent with right carpal tunnel syndrome.  The patient had failure of nonoperative treatment strategies.  After full discussion regarding risks benefits and alternatives the patient elected to forego any additional nonoperative management in favor of right carpal tunnel release.  Informed consent was signed and placed in the chart.    Complications: None noted at the time of surgery    Description of operation: The patient was taken to the operative suite and placed in the supine position on the operating table.  A timeout was performed and the right carpal tunnel was confirmed to be the operative site.  The patient was carefully positioned on the table in such a fashion as to pad all bony prominences and peripheral nerves.  The patient  was then prepped and draped in the normal sterile fashion.  After prepping and draping a longitudinal incision was marked out directly overlying the transverse carpal ligament in line with the ring finger ray.  Forceps were used to gently grasp and pinch the skin in the zone of intended surgical dissection to check for adequate anesthesia.  After confirming adequate anesthesia the 15 blade was used to incise skin and dissect down to the subcutaneous plane.  The palmar aponeurosis was divided in line with the incision to expose the transverse carpal ligament.  The transverse carpal ligament was then meticulously divided under direct visualization, working from distal to proximal, taking care to avoid iatrogenic injury to the underlying contents of the carpal tunnel.  The tenotomy scissors were used to release the most proximal fibers of the transverse carpal ligament along with the most distal fibers of the antebrachial fascia.  This was done under direct visualization.  The distal release of the transverse carpal ligament was carried to the level of the fat change.  Direct inspection and digital palpation confirmed complete release of the carpal tunnel.  Copious irrigation was performed.  Interrupted nylon stitches were used to close the skin.  A bulky nonadherent dressing was placed.  The patient was then allowed to head to recovery in stable condition.  Overall the patient tolerated the procedure well.    Disposition: Stable to PACU        Carlos Haskins  Phone Number: [phone]

## 2024-12-02 NOTE — DISCHARGE INSTRUCTIONS
Medication given may have significant effects after discharge. Therefore on the day of surgery:  1) You must be accompanied by a responsible adult upon discharge and for 24 hours after surgery.  Do not drive a motor vehicle, operate machinery, power tools or appliance, drink alcoholic beverages, or make critical decisions for 24 hours.  2) Be aware of dizziness, which may cause a fall. Change positions slowly.  3) Eating: you may resume your regular diet but it is better to increase intake slowly with mild foods and working up to your regular diet. No greasy, fried or spicy foods today.  4) Nausea/Vomiting: Nausea and vomiting may occur as you become more active or begin to increase food intake. If this should happen, decrease activity and return to liquids. If the problem persists, call your surgeon  5) Pain: Your surgeon may have given you a prescription for pain medication. Take pain medication with food as prescribed. Pain medication may cause constipation, so drink plenty of fluids. If your pain medication does not provide adequate relief, call your surgeon  6) Urinating: Notify your surgeon if you have not urinated within 12 hours after discharge  7) Ice: Apply ice to operative site for 20 min 5-6 times a day or use Polar care as instructed  8) Dressing:   [x]  Remove dressing in 3 Days   [x]  Leave open to air or apply simple Band-Aid after initial dressing is taken off and incision is dry. (If Steri-Strips are applied, leave them in place.)   [x]  No baths, hots tubs, pools, or submerging in fresh water sources. Okay to begin showering and normal hand washing after dressing removal.     []  Leave dressing in place. Keep dressing/ incision clean and dry.      9) Activity:    Shoulder/ Elbow/Hand:   [x]  Elevate extremity    []  Sling   [] At all times (except for exercises and showering)  [] As needed only for comfort   [] Begin daily motion exercises out of sling as instructed   [x]  Bend and flex  fingers/wrist/elbow frequently   [] Non-weight bearing/No lifting/gripping/squeezing to the surgical limb   [x] No lifting greater than 1 lb until follow-up visit      10) Begin physical therapy if advised by your physician:   [] Before returning to see you doctor    [x] Will discuss possible need at follow-up visit   [] Will be paired with your follow-up visit in [place]    11) Call your doctor at [phone] for an appointment (or follow up as scheduled)    Contact [facility] office if  Increased redness, swelling, drainage of any kind, and/or pain to surgery site.  As well as new onset fevers and or chills.  These could signify an infection.  Calf or thigh tenderness to touch as well as increased swelling or redness.  This could signify a clot formation.  Numbness or tingling to an area around the incision site or below the incision site (toes). Or if the operative extremity becomes cold, blue.  Any rash appears, increased  or new onset nausea/vomiting occur.  This may indicate a reaction to a medication.  Temp is 38.5 C (101F)  12) If you have any concerns or questions, please call [facility] surgeon on call. The 24- hour phone is [phone]  13) If you are unable to contact your surgeon, in an emergency situation, go to the nearest hospital  Nerve Blocks    Why is this procedure done?  Nerve blocks can help to manage pain. By giving you a drug into an exact group of nerves, your doctor may be able to block pain to a specific part of your body. Some nerve blocks are used after surgery, especially if you have had an abdominal surgery. Nerve blocks are used before surgery to lessen the need for opioid drugs during and after surgery.  There are a few kinds of nerve blocks. Some nerve blocks are used to:  Treat pain. These may have a pain drug and a drug to help with swelling.  Find where your pain is coming from. This kind of nerve block will have a pain drug that lasts for only a  certain amount of time.  See if another kind of treatment like surgery will help your pain.  Prevent pain during or after a procedure.  Help you avoid surgery.  Give relief of pain during and after surgery.  Lessen the use of opioid drugs needed for pain after surgery.  Block the pain in an area of the body during surgery as anesthesia.  What will the results be?  The nerve block may help to treat or ease your pain. The area may be numb. You may have some pain relief right away. You may be able to use fewer pain medicines after surgery. It also may be easier for you to move around after surgery. Some nerve blocks go away within a few hours. Others give you pain relief for a day or so to a few months or longer. Some nerve blocks can take a few days to work fully.  What happens before the procedure?  Your doctor will ask you about your health history. Talk to the doctor about:  All the drugs you are taking. Be sure to include all prescription, over the counter, and herbal supplements. Tell the doctor if you have any drug allergy. Bring a list of drugs you take with you.  Any bleeding problems. Be sure to tell your doctor if you are taking any drugs that may cause bleeding. Some of these are warfarin, rivaroxaban, apixaban, ticagrelor, clopidogrel, ibuprofen, naproxen, or aspirin. Certain vitamins and herbs, such as garlic and fish oil, may also add to the risk for bleeding. You may need to stop these drugs as well. Talk to your doctor about them.  What happens during the procedure?  Sometimes, the doctor will give you a special drug to make you sleepy for the nerve block. Other times, you are completely awake. You may also have a nerve block as a part of your surgery.  The doctor will position you in a way to give them easy access to where you will be having the nerve block.  The doctor will clean the area and give you a local numbing drug. The doctor will use a long thin needle to give you the nerve block. Often, the  doctor will use a special x-ray, ultrasound, or CT scan to make sure the needle is in the right place. The doctor will inject the drug close to the nerve that is causing your pain. Sometimes they inject the drug in an area that will block pain from a few nerves.  The doctor will take out the needle and place a clean bandage on your skin.  Sometimes, the doctor may leave a catheter in place to deliver medicine over 1 to 2 days. You may have to return to your doctor's office to have the catheter removed.  The procedure takes 15 to 30 minutes.  What happens after the procedure?  If you are not having surgery, you will be able to go home the same day. You may be asked to rest for 15 to 30 minutes. If the doctor gives you a special drug to make you sleepy for the procedure, you will need someone to drive you home.  What care is needed at home?  You will be allowed to shower or take a bath later that same day unless you have a catheter still in place.  You may need other medicines to help with pain as your nerve block wears off.  What follow-up care is needed?  As your body absorbs the drugs, your pain may come back. Talk to your doctor about if you need another nerve block and how often you can have a nerve block.  What problems could happen?  Infection  Bleeding or bruising  Pain at the injection site  Raised blood sugar  Rash  Itching  Numbness  Nerve injury  Allergic reaction  Puncture or laceration of an organ like the liver, spleen. or bowel  Numbing medicine injected into a blood vessel  Where can I learn more?  American Society of Regional Anesthesia  https://www.edwige.com/patient-information/regional-anesthesia  NHS  https://www.nhs.uk/conditions/epidural/  NHS  https://www.nhs.uk/conditions/local-anaesthesia/  [employer] Society [place]  http://www.radiologyinfo.org/en/info.cfm?pg=nerveblock  Last Reviewed Date  2020-04-22

## 2024-12-02 NOTE — ANESTHESIA PROCEDURE NOTES
Peripheral Block    Patient location during procedure: holding area  Start time: 12/2/2024 7:10 AM  Reason for block: primary anesthetic  Staffing  Performed: attending   Authorized by: Don Fournier MD    Performed by: Don Fournier MD  Preanesthetic Checklist  Completed: patient identified, IV checked, site marked, risks and benefits discussed, surgical consent, monitors and equipment checked, pre-op evaluation and timeout performed   Timeout performed at: 12/2/2024 7:10 AM  Peripheral Block  Patient position: laying flat  Prep: ChloraPrep  Patient monitoring: heart rate, cardiac monitor and continuous pulse ox  Block type: other  Laterality: left  Injection technique: single-shot  Local infiltration: lidocaine  Needle  Needle gauge: 27 G  Needle localization: anatomical landmarks  Test dose: negative  Assessment  Injection assessment: negative aspiration for heme, no paresthesia on injection and incremental injection  Paresthesia pain: immediately resolved  Heart rate change: no  Slow fractionated injection: yes  Additional Notes  LT Median and Ulnar Nerve Blocks at the level of wrist.  block 50 mg lidocaine PF + Bupivicaine PF 25 mg + epi !:100,000 . Total volume 20 ml.

## 2024-12-02 NOTE — ANESTHESIA POSTPROCEDURE EVALUATION
Patient: Beth Quinones    Procedure Summary       Date: 12/02/24 Room / Location: [facility]    Anesthesia Start: 0808 Anesthesia Stop:     Procedure: RIGHT CARPAL TUNNEL RELEASE (Right: Wrist) Diagnosis:       Carpal tunnel syndrome, right upper limb      (Carpal tunnel syndrome, right upper limb [G56.01])    Surgeons: Carlos Haskins DO Responsible Provider: Don Fournier MD    Anesthesia Type: regional ASA Status: 3            Anesthesia Type: regional    Vitals Value Taken Time   /89 12/02/24 0820   Temp  12/02/24 0820   Pulse 61 12/02/24 0820   Resp 15 12/02/24 0820   SpO2 96 12/02/24 0820       Anesthesia Post Evaluation    Patient location during evaluation: bedside  Patient participation: complete - patient participated  Level of consciousness: awake and alert  Pain score: 0  Pain management: adequate  Airway patency: patent  Cardiovascular status: acceptable and stable  Respiratory status: acceptable and room air  Hydration status: acceptable  Postoperative Nausea and Vomiting: none        No notable events documented.

## 2024-12-13 ENCOUNTER — LAB (OUTPATIENT)
Dept: LAB | Facility: LAB | Age: 69
End: 2024-12-13
Payer: MEDICARE

## 2024-12-13 DIAGNOSIS — I10 ESSENTIAL (PRIMARY) HYPERTENSION: ICD-10-CM

## 2024-12-13 DIAGNOSIS — M17.0 PRIMARY OSTEOARTHRITIS OF BOTH KNEES: ICD-10-CM

## 2024-12-13 LAB
ALBUMIN SERPL BCP-MCNC: 4.1 G/DL (ref 3.4–5)
ALP SERPL-CCNC: 102 U/L (ref 33–136)
ALT SERPL W P-5'-P-CCNC: 17 U/L (ref 7–45)
ANION GAP SERPL CALC-SCNC: 10 MMOL/L (ref 10–20)
AST SERPL W P-5'-P-CCNC: 18 U/L (ref 9–39)
BILIRUB SERPL-MCNC: 0.5 MG/DL (ref 0–1.2)
BUN SERPL-MCNC: 17 MG/DL (ref 6–23)
CALCIUM SERPL-MCNC: 9.2 MG/DL (ref 8.6–10.3)
CHLORIDE SERPL-SCNC: 108 MMOL/L (ref 98–107)
CO2 SERPL-SCNC: 27 MMOL/L (ref 21–32)
CREAT SERPL-MCNC: 0.85 MG/DL (ref 0.5–1.05)
EGFRCR SERPLBLD CKD-EPI 2021: 74 ML/MIN/1.73M*2
GLUCOSE SERPL-MCNC: 119 MG/DL (ref 74–99)
POTASSIUM SERPL-SCNC: 3.7 MMOL/L (ref 3.5–5.3)
PROT SERPL-MCNC: 7.1 G/DL (ref 6.4–8.2)
SODIUM SERPL-SCNC: 141 MMOL/L (ref 136–145)

## 2024-12-13 PROCEDURE — 80053 COMPREHEN METABOLIC PANEL: CPT

## 2024-12-13 PROCEDURE — 36415 COLL VENOUS BLD VENIPUNCTURE: CPT

## 2024-12-16 ENCOUNTER — OFFICE VISIT (OUTPATIENT)
Dept: ORTHOPEDIC SURGERY | Facility: CLINIC | Age: 69
End: 2024-12-16
Payer: MEDICARE

## 2024-12-16 DIAGNOSIS — M19.049 CMC ARTHRITIS: Primary | ICD-10-CM

## 2024-12-16 PROCEDURE — 99024 POSTOP FOLLOW-UP VISIT: CPT | Performed by: ORTHOPAEDIC SURGERY

## 2024-12-16 PROCEDURE — 99214 OFFICE O/P EST MOD 30 MIN: CPT | Performed by: ORTHOPAEDIC SURGERY

## 2024-12-16 PROCEDURE — 20600 DRAIN/INJ JOINT/BURSA W/O US: CPT | Mod: RT | Performed by: ORTHOPAEDIC SURGERY

## 2024-12-16 PROCEDURE — 1036F TOBACCO NON-USER: CPT | Performed by: ORTHOPAEDIC SURGERY

## 2024-12-16 PROCEDURE — 1123F ACP DISCUSS/DSCN MKR DOCD: CPT | Performed by: ORTHOPAEDIC SURGERY

## 2024-12-16 PROCEDURE — 2500000004 HC RX 250 GENERAL PHARMACY W/ HCPCS (ALT 636 FOR OP/ED): Performed by: ORTHOPAEDIC SURGERY

## 2024-12-16 PROCEDURE — 1159F MED LIST DOCD IN RCRD: CPT | Performed by: ORTHOPAEDIC SURGERY

## 2024-12-16 RX ORDER — LIDOCAINE HYDROCHLORIDE 10 MG/ML
0.5 INJECTION, SOLUTION INFILTRATION; PERINEURAL
Status: COMPLETED | OUTPATIENT
Start: 2024-12-16 | End: 2024-12-16

## 2024-12-16 NOTE — PROGRESS NOTES
History present illness: Patient presents status post right carpal tunnel release.  She has seen significant improvement of numbness and tingling.  Numbness is persisting however to the right long finger.  She describes new pain localized to right thumb at CMC articulation.      Past medical history: The patient's past medical history, family history, social history, and review of systems were documented on the patient medical intake.  The updated data was reviewed in the electronic medical record.  History is negative except otherwise stated in history of present illness.        Physical examination:  General: Alert and oriented to person, place, and time.  No acute distress and breathing comfortably: Pleasant and cooperative with examination.  HEENT: Head is normocephalic and atraumatic.  Neck: Supple, no visible swelling.  Cardiovascular: No palpable tachycardia  Lungs: No audible wheezing or labored breathing  Abdomen: Nondistended.  Extremities: Evaluation of the right upper extremity finds the patient had palpable radial artery at the wrist with brisk capillary refill to all digits.  Patient has intact sensation to axillary radial median and ulnar nerves.  There are no open wounds.  There are no signs of infection.  There is no evidence of lymphedema or lymphatic streaking.  The patient has supple compartments to right arm forearm and hand.  Surgical incision well-healed.  Focal tenderness to right thumb at CMC articulation.      Radiology:      Assessment: Status post right carpal tunnel release with partial improvement of symptoms.  Right thumb CMC arthritis.      Plan: Treatment options were discussed.  We talked about operative and nonoperative strategies for right thumb CMC arthritis.  Recommendations were made for initial trial of steroid injection and a 6-week follow-up.  Patient is agreeable with this strategy.  She was instructed on scar massage and range of motion exercises on the  right.        Procedure:  Hand / UE Inj/Asp: R thumb CMC for osteoarthritis on 12/16/2024 8:24 AM  Indications: pain  Details: 25 G needle, dorsal approach  Medications: 5 mg triamcinolone acetonide 10 mg/mL; 0.5 mL lidocaine 10 mg/mL (1 %)  Outcome: tolerated well, no immediate complications    Right Thumb Carpometacarpal Joint Injection: It was explained to the patient that the risks of a steroid injection include but are not limited to infection, local skin irritation, skin atrophy, calcification, continued pain and discomfort, elevated blood sugar, burning, failure to relieve pain, and possible late infection. The patient verbalized good insight and verbalized consent for the injection. It was further explained that the postinjection discomfort can be alleviated with additional medications, ice, elevation, and rest over the first 24 hours, and that these modalities are recommended.    Using aseptic technique, a solution containing 0.5 cc of 5 mg of Kenalog and 0.5 mL of 1% lidocaine without epinephrine was injected intraarticularly to the right thumb carpometacarpal joint. Digital palpation was used to localize the CMC articulation about the dorsal radial aspect of the joint. Gentle traction was then imparted to the thumb distally and a 25-gauge needle was advanced through the skin, subcutaneous tissue and capsule. The injection was then administered and the patient tolerated the injection well. A band-aid was then placed. It should be noted that ethyl chloride spray was used to make the injection delivery more comfortable for the patient.  Procedure, treatment alternatives, risks and benefits explained, specific risks discussed. Consent was given by the patient. Immediately prior to procedure a time out was called to verify the correct patient, procedure, equipment, support staff and site/side marked as required. Patient was prepped and draped in the usual sterile fashion.

## 2024-12-18 ENCOUNTER — APPOINTMENT (OUTPATIENT)
Dept: PRIMARY CARE | Facility: CLINIC | Age: 69
End: 2024-12-18
Payer: MEDICARE

## 2024-12-18 VITALS
OXYGEN SATURATION: 97 % | TEMPERATURE: 96.4 F | RESPIRATION RATE: 16 BRPM | DIASTOLIC BLOOD PRESSURE: 76 MMHG | SYSTOLIC BLOOD PRESSURE: 114 MMHG | HEART RATE: 70 BPM | WEIGHT: 206 LBS | HEIGHT: 61 IN | BODY MASS INDEX: 38.89 KG/M2

## 2024-12-18 DIAGNOSIS — I10 ESSENTIAL (PRIMARY) HYPERTENSION: Primary | ICD-10-CM

## 2024-12-18 DIAGNOSIS — E78.5 DYSLIPIDEMIA: ICD-10-CM

## 2024-12-18 DIAGNOSIS — F43.22 ADJUSTMENT DISORDER WITH ANXIOUS MOOD: ICD-10-CM

## 2024-12-18 PROCEDURE — 1036F TOBACCO NON-USER: CPT | Performed by: FAMILY MEDICINE

## 2024-12-18 PROCEDURE — 1160F RVW MEDS BY RX/DR IN RCRD: CPT | Performed by: FAMILY MEDICINE

## 2024-12-18 PROCEDURE — 99213 OFFICE O/P EST LOW 20 MIN: CPT | Performed by: FAMILY MEDICINE

## 2024-12-18 PROCEDURE — 3078F DIAST BP <80 MM HG: CPT | Performed by: FAMILY MEDICINE

## 2024-12-18 PROCEDURE — 3074F SYST BP LT 130 MM HG: CPT | Performed by: FAMILY MEDICINE

## 2024-12-18 PROCEDURE — 3008F BODY MASS INDEX DOCD: CPT | Performed by: FAMILY MEDICINE

## 2024-12-18 PROCEDURE — 1123F ACP DISCUSS/DSCN MKR DOCD: CPT | Performed by: FAMILY MEDICINE

## 2024-12-18 PROCEDURE — 1159F MED LIST DOCD IN RCRD: CPT | Performed by: FAMILY MEDICINE

## 2024-12-18 NOTE — PROGRESS NOTES
"Subjective   Patient ID: Beth Quinones is a 69 y.o. female who presents for Anxiety (3 month follow up ).  HPI  69-year-old newly  is here with a history of dyslipidemia hypertension to recheck on her anxiety takes BuSpar if needed perhaps 5 mg a day if not twice a day but is off her benzodiazepine.  She does remain on her low-salt diet as well as her metoprolol and low-fat diet as she remains on 40 mg of Lipitor at nighttime  Status post carpal tunnel release of the right wrist 2 weeks ago otherwise doing fine  Sleep eating is been fairly good  Denies any recent chest pain shortness with or palpitations no new GI/ issues.  For vague knee and low back takes Motrin on if needed basis without side effects  Wise overall mood is coping well no striking anxiety today  Review of Systems   Constitutional:  Negative for fatigue, fever and unexpected weight change.   Eyes:  Negative for visual disturbance.   Respiratory:  Negative for cough, chest tightness and shortness of breath.    Cardiovascular:  Negative for chest pain, palpitations and leg swelling.   Gastrointestinal:  Negative for abdominal pain and blood in stool.   Genitourinary:  Positive for frequency. Negative for dysuria, flank pain and hematuria.   Musculoskeletal:  Positive for arthralgias and myalgias.   Skin:  Negative for rash.   Neurological:  Negative for syncope, weakness, light-headedness and headaches.   Psychiatric/Behavioral:  Negative for behavioral problems, confusion, decreased concentration, sleep disturbance and suicidal ideas. The patient is nervous/anxious.        Objective   /76 (BP Location: Right arm, Patient Position: Sitting, BP Cuff Size: Large adult)   Pulse 70   Temp 35.8 °C (96.4 °F) (Temporal)   Resp 16   Ht 1.549 m (5' 1\")   Wt 93.4 kg (206 lb)   SpO2 97%   BMI 38.92 kg/m²   ON NUMBER(S):  [id number]; [id number]      ORDERING CLINICIAN:  DARLYN DUNHAM      INDICATION:  Right breast BI-RADS 0 screening " mammogram 4A small mass in the  central outer aspect posteriorly.      ,R92.8 Other abnormal and inconclusive findings on diagnostic imaging  of breast      COMPARISON:  Mammograms of 09/06/2024, 03/17/2023, 08/16/2021, 08/28/2018.      FINDINGS:  MAMMOGRAPHY: Spot compression tomosynthesis images of the right  breast central posterior aspect were obtained in the CC and MLO  projections.      Density:  The breasts are heterogeneously dense, which may obscure  small masses.      There is a persistent small round circumscribed mass in the right  breast posterior slightly outer aspect which contains a punctate  calcification.      ULTRASOUND: Targeted ultrasound was performed of the right breast  central posterior aspect by a registered sonographer.      Within the right breast at the 8:30 position 5 cm from nipple edge a  small ovoid circumscribed hypoechoic cyst measures 5 x 3 x 5 mm. A  tiny hyperechoic nodular focus along the peripheral margin of this  cyst likely represents a tiny calcification as also demonstrated on  mammogram.      IMPRESSION:  Right breast persistent small mass in the central posterior slightly  outer aspect corresponds to a 5 mm cyst with tiny peripheral  calcification.      BI-RADS CATEGORY:  BI-RADS Category:  2 Benign.  e metabolic panel  Order: [id number]   Status: Final result       Visible to patient: Yes (seen)       Dx: Essential (primary) hypertension; Arabella...    2 Result Notes          Component  Ref Range & Units 9 d ago 8 mo ago 2 yr ago 3 yr ago 4 yr ago   Glucose  74 - 99 mg/dL 119 High   106 High  99 95   Sodium  136 - 145 mmol/L 141  140 144 143   Potassium  3.5 - 5.3 mmol/L 3.7  4.6 4.1 4.3   Chloride  98 - 107 mmol/L 108 High   105 105 104   Bicarbonate  21 - 32 mmol/L 27  28 31 29   Anion Gap  10 - 20 mmol/L 10  12 12 14   Urea Nitrogen  6 - 23 mg/dL 17  18 18 18   Creatinine  0.50 - 1.05 mg/dL 0.85  1.09 High  1.02 0.86   eGFR  >60 mL/min/1.73m*2 74       Comment:  Calculations of estimated GFR are performed using the 2021 CKD-EPI Study Refit equation without the race variable for the IDMS-Traceable creatinine methods.  https://jasn.asnjournals.org/content/early/2021/09/22/ASN.5107865377   Calcium  8.6 - 10.3 mg/dL 9.2  9.4 9.6 10.1 R   Albumin  3.4 - 5.0 g/dL 4.1  4.3 4.2 4.7   Alkaline Phosphatase  33 - 136 U/L 102  79 93 103   Total Protein  6.4 - 8.2 g/dL 7.1  7.0 7.4 7.5   AST  9 - 39 U/L 18  17 21 17   Bilirubin, Total  0.0 - 1.2 mg/dL 0.5  0.4 0.5 0.4   ALT  7 - 45 U/L 17 15 CM 15 CM 17 CM 18 CM   Comment: Patients      LDL cholesterol, direct  Order: [id number]   Status: Final result       Visible to patient: Yes (seen)       Dx: Dyslipidemia    2 Result Notes       1  Topic         Component  Ref Range & Units 8 mo ago 2 yr ago 3 yr ago 4 yr ago   LDL, Direct  0 - 129 mg/dL 86 105   CM   Resulting Agency [facility]              Narrative  Fecal Occult Blood Immunoassy  Order: [id number]   Resulted 2/2/2024 19:06       Status: Edited Result - FINAL       Visible to patient: Yes (seen)    Specimen Information: Stool   0 Result Notes       1  Topic      Component    Fecal Occult Blood Immunoassay External Negative                TSH  Order: [id number]   Status: Final result       Visible to patient: Yes (seen)       Dx: Weight gain    2 Result Notes       Component  Ref Range & Units 11 mo ago 3 yr ago   Thyroid Stimulating Hormone  0.44 - 3.98 mIU/L 2.04 1.93 CM   Resulting Agency [facility]              [place]arrative  [place] [facility] [place]  TSH testing is performed using different testing methodology at [facility]   Interpretation Summary    Normal sinus rhythm  Normal ECG  No previous ECGs available  Confirmed by JEFE MCCULLOUGH ([id number]) on 3/5/2015 8:20:33 AM  Measuremen        Physical Exam  Also reviewed and normal  Neck neck is all without mass adenopathy bruits rigidity  Cardiovascular  grade 1/2 over 6 systolic murmur heard at aortic area otherwise no diastolic murmurs gallop or ectopy  Pulmonary chest clear anteriorly and posteriorly  Musculoskeletal right palmar wrist incision healing very nicely good  strength no sensorimotor vascular deficit the right hand  Peripheral vascular no symmetric or asymmetric edema to lower extremities no sensory deficits noted.  Mood is stable without significant anxiety depressive or cognitive issues  Reviewed recent mildly elevated sugar but otherwise earlier TSH mammogram was normal also earlier LDL is controlled on her above Lipitor      Assessment/Plan   Problem List Items Addressed This Visit    None  Continue her metoprolol 50 mg daily and low-salt diet  Continue Lipitor at nighttime and low-fat diet  Continue buspirone if needed basis only  Follow-up in 3-4 months earlier if interval problems continue follow-up with her GYN  Follow-up with orthopedics regarding her postop carpal tunnel release  This year's lab reviewed in detail  @discharge  The above diagnosis and treatment plan was discussed with the patient patient will continue appropriate diet and exercise as reviewed  Patient will recheck earlier if any interval problems of significance or clinical worsening of the above problems.  Agrees above surveillance.  All question were addressed regarding above meds

## 2024-12-22 ASSESSMENT — ENCOUNTER SYMPTOMS
HEADACHES: 0
FEVER: 0
FREQUENCY: 1
CONFUSION: 0
NERVOUS/ANXIOUS: 1
UNEXPECTED WEIGHT CHANGE: 0
FLANK PAIN: 0
DYSURIA: 0
LIGHT-HEADEDNESS: 0
COUGH: 0
CHEST TIGHTNESS: 0
WEAKNESS: 0
ABDOMINAL PAIN: 0
SLEEP DISTURBANCE: 0
BLOOD IN STOOL: 0
HEMATURIA: 0
SHORTNESS OF BREATH: 0
FATIGUE: 0
ARTHRALGIAS: 1
DECREASED CONCENTRATION: 0
MYALGIAS: 1
PALPITATIONS: 0

## 2024-12-23 ENCOUNTER — TELEPHONE (OUTPATIENT)
Dept: ORTHOPEDIC SURGERY | Facility: CLINIC | Age: 69
End: 2024-12-23
Payer: MEDICARE

## 2024-12-23 NOTE — TELEPHONE ENCOUNTER
Patient called and states she is s/p RT CTR from 12/2/24, she states she just saw Dr. Haskins last week and had mentioned the very tip of her middle finger was numb, but today she noticed that the whole finger is starting to feel numb and was wondering if she should be concerned about this or not.    She would appreciate a call back at [phone]

## 2025-01-27 ENCOUNTER — APPOINTMENT (OUTPATIENT)
Dept: ORTHOPEDIC SURGERY | Facility: CLINIC | Age: 70
End: 2025-01-27
Payer: MEDICARE

## 2025-01-27 DIAGNOSIS — M19.049 CMC ARTHRITIS: Primary | ICD-10-CM

## 2025-01-27 DIAGNOSIS — G56.01 CARPAL TUNNEL SYNDROME OF RIGHT WRIST: ICD-10-CM

## 2025-01-27 PROCEDURE — 1123F ACP DISCUSS/DSCN MKR DOCD: CPT | Performed by: ORTHOPAEDIC SURGERY

## 2025-01-27 PROCEDURE — 1036F TOBACCO NON-USER: CPT | Performed by: ORTHOPAEDIC SURGERY

## 2025-01-27 PROCEDURE — 99024 POSTOP FOLLOW-UP VISIT: CPT | Performed by: ORTHOPAEDIC SURGERY

## 2025-01-27 PROCEDURE — 99213 OFFICE O/P EST LOW 20 MIN: CPT | Performed by: ORTHOPAEDIC SURGERY

## 2025-01-27 PROCEDURE — 1159F MED LIST DOCD IN RCRD: CPT | Performed by: ORTHOPAEDIC SURGERY

## 2025-02-11 ENCOUNTER — APPOINTMENT (OUTPATIENT)
Dept: ORTHOPEDIC SURGERY | Facility: CLINIC | Age: 70
End: 2025-02-11
Payer: MEDICARE

## 2025-02-11 ENCOUNTER — HOSPITAL ENCOUNTER (OUTPATIENT)
Dept: RADIOLOGY | Facility: EXTERNAL LOCATION | Age: 70
Discharge: HOME | End: 2025-02-11

## 2025-02-11 DIAGNOSIS — M17.0 PRIMARY OSTEOARTHRITIS OF BOTH KNEES: Primary | ICD-10-CM

## 2025-02-11 PROCEDURE — 99213 OFFICE O/P EST LOW 20 MIN: CPT | Performed by: INTERNAL MEDICINE

## 2025-02-11 PROCEDURE — 1036F TOBACCO NON-USER: CPT | Performed by: INTERNAL MEDICINE

## 2025-02-11 PROCEDURE — 1123F ACP DISCUSS/DSCN MKR DOCD: CPT | Performed by: INTERNAL MEDICINE

## 2025-02-11 PROCEDURE — 1159F MED LIST DOCD IN RCRD: CPT | Performed by: INTERNAL MEDICINE

## 2025-02-11 PROCEDURE — 20611 DRAIN/INJ JOINT/BURSA W/US: CPT | Performed by: INTERNAL MEDICINE

## 2025-02-11 RX ORDER — LIDOCAINE HYDROCHLORIDE 10 MG/ML
5 INJECTION, SOLUTION INFILTRATION; PERINEURAL
Status: COMPLETED | OUTPATIENT
Start: 2025-02-11 | End: 2025-02-11

## 2025-02-11 RX ORDER — BETAMETHASONE SODIUM PHOSPHATE AND BETAMETHASONE ACETATE 3; 3 MG/ML; MG/ML
2 INJECTION, SUSPENSION INTRA-ARTICULAR; INTRALESIONAL; INTRAMUSCULAR; SOFT TISSUE
Status: COMPLETED | OUTPATIENT
Start: 2025-02-11 | End: 2025-02-11

## 2025-02-11 RX ADMIN — BETAMETHASONE SODIUM PHOSPHATE AND BETAMETHASONE ACETATE 2 ML: 3; 3 INJECTION, SUSPENSION INTRA-ARTICULAR; INTRALESIONAL; INTRAMUSCULAR; SOFT TISSUE at 12:14

## 2025-02-11 RX ADMIN — LIDOCAINE HYDROCHLORIDE 5 ML: 10 INJECTION, SOLUTION INFILTRATION; PERINEURAL at 12:14

## 2025-02-11 NOTE — PROGRESS NOTES
CC:   Chief Complaint   Patient presents with    Left Knee - Follow-up     S/p Synvisc 3 series 06/11/24    Right Knee - Follow-up     S/p Synvisc 3 series 06/11/24       HPI: Beth is a 69 y.o. female presents today for bilateral knee pain secondary to known osteoarthritis, and requesting a repeat ultrasound-guided corticosteroid injections for known osteoarthritis. She is status post Synvisc #3 on 6/11/24. She states that she had significant relief from the injections and would like to repeat the bilateral knee viscosupplementation injections.  She is not interested in type of knee replacement at this time or surgical options.        Review of Systems   GENERAL: Negative for malaise, significant weight loss, fever  MUSCULOSKELETAL: See HPI  NEURO:  Negative for numbness / tingling     Past Medical History  Past Medical History:   Diagnosis Date    Acute maxillary sinusitis, unspecified 04/20/2022    Acute non-recurrent maxillary sinusitis    Acute non-recurrent maxillary sinusitis 02/13/2023    Anxiety     Arthritis     Chronic sinusitis, unspecified 12/16/2020    Sinobronchitis    High cholesterol     Hypertension     Pain, unspecified     Pain    Personal history of other specified conditions 05/27/2015    History of abnormal mammogram    Snores     Vertigo     Wears glasses     reading       Medication review  Medication Documentation Review Audit       Reviewed by Lilly Correia MA (Medical Assistant) on 02/11/25 at 0845      Medication Order Taking? Sig Documenting Provider Last Dose Status   aspirin 81 mg EC tablet [id number]  Take 1 tablet (81 mg) by mouth once daily. Historical Provider, MD  Active   atorvastatin (Lipitor) 40 mg tablet [id number] No Take 1 tablet (40 mg) by mouth once daily.   Patient taking differently: Take 1 tablet (40 mg) by mouth once daily at bedtime.    Mro Neri,  12/1/2024 Evening Active   busPIRone (Buspar) 5 mg tablet [id number] No Take 1 tablet (5 mg) by mouth once daily  as needed (if needed nerves). Mor Neri, DO Past Week Active   calcium carb/D3/magnesium/zinc (calcium carb-D3-mag cmb11-zinc) [phone] mg-unit-mg-mg tablet [id number] No Take 1 tablet by mouth once daily. Historical Provider, MD Past Week Active   ergocalciferol (Vitamin D-2) 1.25 MG (95805 UT) capsule [id number] No Take 1 capsule (1,250 mcg) by mouth 1 (one) time per week.   Patient taking differently: Take 1 capsule (1,250 mcg) by mouth 1 (one) time per week. Sunday    Mor Neri, DO Past Week Active   estradiol (Estrace) 0.01 % (0.1 mg/gram) vaginal cream [id number] No Insert 0.25 Applicatorfuls (1 g) into the vagina 3 times a week. Carlene Zendejas, DO Past Week Active   ibuprofen 600 mg tablet [id number] No Take 1 tablet (600 mg) by mouth 2 times daily (morning and late afternoon). Mor Neri DO Past Week Active   meclizine (Antivert) 12.5 mg tablet [id number] No Take 1 tablet (12.5 mg) by mouth 3 times a day as needed for dizziness. Mor Neri DO More than a month Active   metoprolol succinate XL (Toprol-XL) 50 mg 24 hr tablet [id number] No Take 1 tablet (50 mg) by mouth once daily. Do not crush or chew. Mor Neri DO 12/1/2024 11:59 PM Active                    Allergies  No Known Allergies    Social History  Social History     Socioeconomic History    Marital status:      Spouse name: Not on file    Number of children: Not on file    Years of education: Not on file    Highest education level: Not on file   Occupational History    Not on file   Tobacco Use    Smoking status: Never     Passive exposure: Past    Smokeless tobacco: Never   Vaping Use    Vaping status: Never Used   Substance and Sexual Activity    Alcohol use: Never    Drug use: Never    Sexual activity: Defer     Comment: Hysterectomy   Other Topics Concern    Not on file   Social History Narrative    Not on file     Social Drivers of Health     Financial Resource Strain: Not on file   Food Insecurity: Not on file    Transportation Needs: Not on file   Physical Activity: Not on file   Stress: Not on file   Social Connections: Not on file   Intimate Partner Violence: Not on file   Housing Stability: Not on file       Surgical History  Past Surgical History:   Procedure Laterality Date    BREAST BIOPSY Right     CHOLECYSTECTOMY  02/09/2015    COLONOSCOPY      ESOPHAGOGASTRODUODENOSCOPY      HYSTERECTOMY  02/09/2015    MOUTH SURGERY      tooth removed       Physical Exam:  GENERAL:  Patient is awake, alert, and oriented to person place and time.  Patient appears well nourished and well kept.  Affect Calm, Not Acutely Distressed.  HEENT:  Normocephalic, Atraumatic, EOMI  CARDIOVASCULAR:  Hemodynamically stable.  RESPIRATORY:  Normal respirations with unlabored breathing.  Extremity: Right knee examination shows skin is intact.  There is no erythema or warmth.  Trace amount of effusion.  Can flex the right knee to 130 degrees.  Full extension at 0 degrees.  Pain over the medial joint line.  Pain over the lateral joint line.  Pain of the medial and lateral patellar facets.  There is no pain over the patellar or quadricep tendon.  No pain over the proximal tibia.  No pain over the popliteal fossa.  Negative valgus stress test.  Negative varus stress test.  Negative Alber's test medially with no instability.  Negative Alber's test laterally with no instability.  Negative Lachman's test.  Patellar and quadricep mechanism intact.  Negative anterior and posterior drawer test.  Negative patellar apprehension test.  Distal pulses are palpable, neurovascularly intact.  Walking with no significant antalgic gait.      Left knee examination shows skin is intact.  There is no erythema or warmth.  1+ effusion.  Can flex the right knee to 130 degrees.  Full extension at 0 degrees.  Pain over the medial joint line.  Pain over the lateral joint line.  Pain of the medial and lateral patellar facets.  There is no pain over the patellar or  quadricep tendon.  No pain over the proximal tibia.  No pain over the popliteal fossa.  Negative valgus stress test.  Negative varus stress test.  Negative Alber's test medially with no instability.  Negative Alber's test laterally with no instability.  Negative Lachman's test.  Patellar and quadricep mechanism intact.  Negative anterior and posterior drawer test.  Negative patellar apprehension test.  Distal pulses are palpable, neurovascularly intact.  Walking with no significant antalgic gait.        Diagnostics: Previous x-rays reviewed        Procedure: L Inj/Asp: bilateral knee on 2/11/2025 12:14 PM  Indications: pain  Details: 22 G needle, ultrasound-guided lateral approach  Medications (Right): 2 mL betamethasone acet,sod phos 6 mg/mL; 5 mL lidocaine 10 mg/mL (1 %)  Medications (Left): 2 mL betamethasone acet,sod phos 6 mg/mL; 5 mL lidocaine 10 mg/mL (1 %)  Outcome: tolerated well, no immediate complications  Procedure, treatment alternatives, risks and benefits explained, specific risks discussed. Consent was given by the patient. Immediately prior to procedure a time out was called to verify the correct patient, procedure, equipment, support staff and site/side marked as required. Patient was prepped and draped in the usual sterile fashion.             Assessment: Bilateral knee osteoarthritis     Plan: Beth presents today for bilateral knee pain secondary to known osteoarthritis of the right and left knee.  She is requesting a repeat corticosteroid injection to both knees, risk and benefits of the procedure discussed.  Will also submit for 3 series viscosupplement injections which gives her significant relief.  She will follow-up upon approval of the gel injections for both knees.    No orders of the defined types were placed in this encounter.     At the conclusion of the visit there were no further questions by the patient/family regarding their plan of care.  Patient was instructed to call or  return with any issues, questions, or concerns regarding their injury and/or treatment plan described above.     02/11/25 at 8:50 AM - Siena Sky MD  Scribe Attestation  By signing my name below, I, Gerson Tran, Scribe   attest that this documentation has been prepared under the direction and in the presence of Siena Sky MD.    Office: [phone]    This note was prepared using voice recognition software.  The details of this note are correct and have been reviewed, and corrected to the best of my ability.  Some grammatical errors may persist related to the Dragon software.

## 2025-03-03 ENCOUNTER — APPOINTMENT (OUTPATIENT)
Dept: PRIMARY CARE | Facility: CLINIC | Age: 70
End: 2025-03-03
Payer: MEDICARE

## 2025-03-03 VITALS
TEMPERATURE: 97.3 F | DIASTOLIC BLOOD PRESSURE: 72 MMHG | HEART RATE: 77 BPM | WEIGHT: 207 LBS | SYSTOLIC BLOOD PRESSURE: 118 MMHG | BODY MASS INDEX: 40.64 KG/M2 | RESPIRATION RATE: 16 BRPM | HEIGHT: 60 IN | OXYGEN SATURATION: 97 %

## 2025-03-03 DIAGNOSIS — E78.5 DYSLIPIDEMIA: ICD-10-CM

## 2025-03-03 DIAGNOSIS — Z00.00 MEDICARE ANNUAL WELLNESS VISIT, SUBSEQUENT: ICD-10-CM

## 2025-03-03 DIAGNOSIS — I10 ESSENTIAL (PRIMARY) HYPERTENSION: ICD-10-CM

## 2025-03-03 DIAGNOSIS — F43.22 ADJUSTMENT DISORDER WITH ANXIOUS MOOD: ICD-10-CM

## 2025-03-03 DIAGNOSIS — R73.9 HYPERGLYCEMIA: ICD-10-CM

## 2025-03-03 DIAGNOSIS — Z00.00 ROUTINE GENERAL MEDICAL EXAMINATION AT HEALTH CARE FACILITY: Primary | ICD-10-CM

## 2025-03-03 PROCEDURE — G0439 PPPS, SUBSEQ VISIT: HCPCS | Performed by: FAMILY MEDICINE

## 2025-03-03 PROCEDURE — 1160F RVW MEDS BY RX/DR IN RCRD: CPT | Performed by: FAMILY MEDICINE

## 2025-03-03 PROCEDURE — 1123F ACP DISCUSS/DSCN MKR DOCD: CPT | Performed by: FAMILY MEDICINE

## 2025-03-03 PROCEDURE — 3078F DIAST BP <80 MM HG: CPT | Performed by: FAMILY MEDICINE

## 2025-03-03 PROCEDURE — 3008F BODY MASS INDEX DOCD: CPT | Performed by: FAMILY MEDICINE

## 2025-03-03 PROCEDURE — 1159F MED LIST DOCD IN RCRD: CPT | Performed by: FAMILY MEDICINE

## 2025-03-03 PROCEDURE — 1170F FXNL STATUS ASSESSED: CPT | Performed by: FAMILY MEDICINE

## 2025-03-03 PROCEDURE — 3074F SYST BP LT 130 MM HG: CPT | Performed by: FAMILY MEDICINE

## 2025-03-03 RX ORDER — ATORVASTATIN CALCIUM 40 MG/1
40 TABLET, FILM COATED ORAL DAILY
Qty: 90 TABLET | Refills: 3 | Status: SHIPPED | OUTPATIENT
Start: 2025-03-03

## 2025-03-03 RX ORDER — BUSPIRONE HYDROCHLORIDE 5 MG/1
5 TABLET ORAL DAILY PRN
Qty: 90 TABLET | Refills: 3 | Status: SHIPPED | OUTPATIENT
Start: 2025-03-03 | End: 2026-02-26

## 2025-03-03 ASSESSMENT — ACTIVITIES OF DAILY LIVING (ADL)
DOING_HOUSEWORK: INDEPENDENT
DRESSING: INDEPENDENT
TAKING_MEDICATION: INDEPENDENT
BATHING: INDEPENDENT
MANAGING_FINANCES: INDEPENDENT
GROCERY_SHOPPING: INDEPENDENT

## 2025-03-03 ASSESSMENT — PATIENT HEALTH QUESTIONNAIRE - PHQ9
1. LITTLE INTEREST OR PLEASURE IN DOING THINGS: NOT AT ALL
2. FEELING DOWN, DEPRESSED OR HOPELESS: NOT AT ALL
SUM OF ALL RESPONSES TO PHQ9 QUESTIONS 1 AND 2: 0

## 2025-03-03 ASSESSMENT — ENCOUNTER SYMPTOMS
DEPRESSION: 0
OCCASIONAL FEELINGS OF UNSTEADINESS: 0
LOSS OF SENSATION IN FEET: 0

## 2025-03-03 NOTE — ASSESSMENT & PLAN NOTE
Orders:    atorvastatin (Lipitor) 40 mg tablet; Take 1 tablet (40 mg) by mouth once daily.    Comprehensive metabolic panel; Future    Lipid panel; Future

## 2025-03-03 NOTE — ASSESSMENT & PLAN NOTE
Orders:    busPIRone (Buspar) 5 mg tablet; Take 1 tablet (5 mg) by mouth once daily as needed (if needed nerves).    Comprehensive metabolic panel; Future

## 2025-03-03 NOTE — PROGRESS NOTES
Subjective   Reason for Visit: Beth Quinones is an 69 y.o. female here for a Medicare Wellness visit.          Reviewed all medications by prescribing practitioner or clinical pharmacist (such as prescriptions, OTCs, herbal therapies and supplements) and documented in the medical record.    HPI  69-year-old  female is here for Medicare wellness otherwise doing well on her 40 mg Lipitor for dyslipidemia  Does take metoprolol 50 mg for hypertension and for only 5 mg if needed throughout the course of the day.  Otherwise handling being  at this time fairly well sleeps fairly well he is off benzodiazepine for anxiety for the last family stressors.  Otherwise last year was Hemoccult was negative declines colonoscopy is due for mammography in the rachel but otherwise no change in SBE no change in appearance or frequency of stool  Gratefully no chest pain shortness of breath or palpitations.  Observing better routines and eating and sleeping with less stress.  Meds reviewed.  No side effects  Patient Care Team:  Mor Neri DO as PCP - General  Mor Neri DO as PCP - [employer] PCP     Review of Systems   Constitutional:  Negative for fatigue, fever and unexpected weight change.   HENT:  Positive for rhinorrhea. Negative for sinus pressure, sinus pain and sore throat.    Respiratory:  Negative for cough, shortness of breath and wheezing.    Cardiovascular:  Negative for chest pain, palpitations and leg swelling.   Gastrointestinal:  Negative for abdominal pain, blood in stool and vomiting.   Genitourinary:  Positive for frequency and hematuria. Negative for dysuria and flank pain.   Musculoskeletal:  Positive for arthralgias and back pain.   Skin:  Negative for rash.   Neurological:  Negative for syncope, light-headedness, numbness and headaches.   Psychiatric/Behavioral:  Negative for confusion, sleep disturbance and suicidal ideas. The patient is nervous/anxious.        Objective    Vitals:  /72 (BP Location: Left arm, Patient Position: Sitting, BP Cuff Size: Large adult)   Pulse 77   Temp 36.3 °C (97.3 °F) (Temporal)   Resp 16   Ht 1.524 m (5')   Wt 93.9 kg (207 lb)   SpO2 97%   BMI 40.43 kg/m²     tabolic panel  Order: [id number]   Status: Final result       Visible to patient: Yes (seen)       Dx: Essential (primary) hypertension; Arabella...    2 Result Notes          Component  Ref Range & Units 2 mo ago 10 mo ago 2 yr ago 3 yr ago 5 yr ago   Glucose  74 - 99 mg/dL 119 High   106 High  99 95   Sodium  136 - 145 mmol/L 141  140 144 143   Potassium  3.5 - 5.3 mmol/L 3.7  4.6 4.1 4.3   Chloride  98 - 107 mmol/L 108 High   105 105 104   Bicarbonate  21 - 32 mmol/L 27  28 31 29   Anion Gap  10 - 20 mmol/L 10  12 12 14   Urea Nitrogen  6 - 23 mg/dL 17  18 18 18   Creatinine  0.50 - 1.05 mg/dL 0.85  1.09 High  1.02 0.86   eGFR  >60 mL/min/1.73m*2 74       Comment: Calculations of estimated GFR are performed using the 2021 CKD-EPI Study Refit equation without the race variable for the IDMS-Traceable creatinine methods.  https://jasn.asnjournals.org/content/early/2021/09/22/ASN.7337105770   Calcium  8.6 - 10.3 mg/dL 9.2  9.4 9.6 10.1 R   Albumin  3.4 - 5.0 g/dL 4.1  4.3 4.2 4.7   Alkaline Phosphatase  33 - 136 U/L 102  79 93 103   Total Protein  6.4 - 8.2 g/dL 7.1  7.0 7.4 7.5   AST  9 - 39 U/L 18  17 21 17   Bilirubin, Total  0.0 - 1.2 mg/dL 0.5  0.4 0.5 0.4   ALT  7 - 45 U/L 17 15 CM 15 CM 17 CM 18 CM   Comment: Patients tr      LDL cholesterol, direct  Order: [id number]   Status: Final result       Visible to patient: Yes (seen)       Dx: Dyslipidemia    2 Result Notes       1 HM Topic         Component  Ref Range & Units 10 mo ago 2 yr ago 3 yr ago 5 yr ago   LDL, Direct  0 - 129 mg/dL 86 105   CM   Resulting Agency [facility]              Narrative  Performed by: [facility]  Elevated levels of LDL cholesterol are recognized as a key  factor in the deve   TSH  Order: [id number]   Status: Final result       Visible to patient: Yes (seen)       Dx: Weight gain    2 Result Notes       Component  Ref Range & Units 1 yr ago 3 yr ago   Thyroid Stimulating Hormone  0.44 - 3.98 mIU/L 2.04 1.93 CM   Resulting Agency [facility]              Narrative  Performed by: [facility] [place]  TSH testing is perfo     Physical Exam  Normal pulse ox good  General inspection reveals a pleasant interactive individual in no distress  Neck neck is supple no mass adenopathy bruits rigidity  Cardiovascular Oarrs are without murmur gallop or ectopy  Chest chest is clear anteriorly and posteriorly  Mood mood is stable without anxiety depressive or cognitive issue  Assessment & Plan  Adjustment disorder with anxious mood    Orders:    busPIRone (Buspar) 5 mg tablet; Take 1 tablet (5 mg) by mouth once daily as needed (if needed nerves).    Comprehensive metabolic panel; Future    Dyslipidemia    Orders:    atorvastatin (Lipitor) 40 mg tablet; Take 1 tablet (40 mg) by mouth once daily.    Comprehensive metabolic panel; Future    Lipid panel; Future    Essential (primary) hypertension    Orders:    Comprehensive metabolic panel; Future    Body mass index (BMI) 40.0-44.9, adult (Multi)  Low-calorie diet with low-salt low carbohydrate and low-fat diet trying to do it and we did review modifications         Hyperglycemia         Medicare annual wellness visit, subsequent                 If any change in frequency or appearance of stool we will recheck in 3 months to 4 months and then we will proceed with the mammography otherwise continue good routines continue low-salt low carbohydrate and low-fat diet continue above Lipitor aspirin and above antihypertensive medicines.  May take BuSpar if needed otherwise continue weight reducing diet clinically stable at this time needs updated on the shingles shot but otherwise other immunizations are stable problems  Will check lipid CMP  before next visit  @discharge  The above diagnosis and treatment plan was discussed with the patient patient will continue appropriate diet and exercise as reviewed  Patient will recheck earlier if any interval problems of significance or clinical worsening of the above problems.  Agrees above surveillance.  All question were addressed regarding above meds

## 2025-03-06 PROBLEM — R73.9 HYPERGLYCEMIA: Status: ACTIVE | Noted: 2025-03-06

## 2025-03-06 ASSESSMENT — ENCOUNTER SYMPTOMS
VOMITING: 0
BLOOD IN STOOL: 0
NERVOUS/ANXIOUS: 1
LIGHT-HEADEDNESS: 0
SHORTNESS OF BREATH: 0
UNEXPECTED WEIGHT CHANGE: 0
BACK PAIN: 1
FLANK PAIN: 0
CONFUSION: 0
FATIGUE: 0
COUGH: 0
FREQUENCY: 1
SINUS PAIN: 0
ABDOMINAL PAIN: 0
PALPITATIONS: 0
WHEEZING: 0
ARTHRALGIAS: 1
RHINORRHEA: 1
DYSURIA: 0
FEVER: 0
NUMBNESS: 0
SLEEP DISTURBANCE: 0
HEADACHES: 0
SORE THROAT: 0
HEMATURIA: 1
SINUS PRESSURE: 0

## 2025-03-07 NOTE — ASSESSMENT & PLAN NOTE
Low-calorie diet with low-salt low carbohydrate and low-fat diet trying to do it and we did review modifications

## 2025-03-15 ENCOUNTER — OFFICE VISIT (OUTPATIENT)
Dept: URGENT CARE | Age: 70
End: 2025-03-15
Payer: MEDICARE

## 2025-03-15 VITALS
BODY MASS INDEX: 38.83 KG/M2 | RESPIRATION RATE: 18 BRPM | TEMPERATURE: 99.4 F | WEIGHT: 211 LBS | DIASTOLIC BLOOD PRESSURE: 73 MMHG | HEIGHT: 62 IN | OXYGEN SATURATION: 95 % | HEART RATE: 77 BPM | SYSTOLIC BLOOD PRESSURE: 112 MMHG

## 2025-03-15 DIAGNOSIS — R05.1 ACUTE COUGH: ICD-10-CM

## 2025-03-15 DIAGNOSIS — H66.90 ACUTE OTITIS MEDIA, UNSPECIFIED OTITIS MEDIA TYPE: Primary | ICD-10-CM

## 2025-03-15 DIAGNOSIS — K12.1 STOMATITIS: ICD-10-CM

## 2025-03-15 DIAGNOSIS — J02.9 SORE THROAT: ICD-10-CM

## 2025-03-15 LAB
POC HUMAN RHINOVIRUS PCR: NEGATIVE
POC INFLUENZA A VIRUS PCR: NEGATIVE
POC INFLUENZA B VIRUS PCR: NEGATIVE
POC RESPIRATORY SYNCYTIAL VIRUS PCR: NEGATIVE
POC STREPTOCOCCUS PYOGENES (GROUP A STREP) PCR: NEGATIVE

## 2025-03-15 RX ORDER — LIDOCAINE HYDROCHLORIDE 20 MG/ML
1.25 SOLUTION OROPHARYNGEAL EVERY 6 HOURS PRN
Qty: 100 ML | Refills: 0 | Status: SHIPPED | OUTPATIENT
Start: 2025-03-15 | End: 2025-03-25

## 2025-03-15 RX ORDER — AMOXICILLIN AND CLAVULANATE POTASSIUM 875; 125 MG/1; MG/1
1 TABLET, FILM COATED ORAL 2 TIMES DAILY
Qty: 20 TABLET | Refills: 0 | Status: SHIPPED | OUTPATIENT
Start: 2025-03-15 | End: 2025-03-25

## 2025-03-15 RX ORDER — BROMPHENIRAMINE MALEATE, PSEUDOEPHEDRINE HYDROCHLORIDE, AND DEXTROMETHORPHAN HYDROBROMIDE 2; 30; 10 MG/5ML; MG/5ML; MG/5ML
5 SYRUP ORAL 4 TIMES DAILY PRN
Qty: 120 ML | Refills: 0 | Status: SHIPPED | OUTPATIENT
Start: 2025-03-15 | End: 2025-03-25

## 2025-03-15 ASSESSMENT — ENCOUNTER SYMPTOMS
COUGH: 1
EYES NEGATIVE: 1
PSYCHIATRIC NEGATIVE: 1
GASTROINTESTINAL NEGATIVE: 1
NEUROLOGICAL NEGATIVE: 1
MUSCULOSKELETAL NEGATIVE: 1
ENDOCRINE NEGATIVE: 1
HEMATOLOGIC/LYMPHATIC NEGATIVE: 1
ALLERGIC/IMMUNOLOGIC NEGATIVE: 1
SORE THROAT: 1
PALPITATIONS: 0
CONSTITUTIONAL NEGATIVE: 1

## 2025-03-15 ASSESSMENT — PAIN SCALES - GENERAL: PAINLEVEL_OUTOF10: 8

## 2025-03-15 NOTE — PROGRESS NOTES
Subjective   Patient ID: Beth Quinones is a 69 y.o. female. They present today with a chief complaint of Sore Throat, Cough, Earache, and Headache (Bilateral ears pain. Tongue sores x 4 days ).    History of Present Illness  HPI      Pt presents to urgent care with c/o  sore throat, ear pain, cough, headache, sores on her tongue x 4 days  Pt denies CP, SOB, palpitations, fevers, abd pain, n/v/d, sick contacts, recent travel.      Past Medical History  Allergies as of 03/15/2025    (No Known Allergies)       (Not in a hospital admission)       Past Medical History:   Diagnosis Date    Acute maxillary sinusitis, unspecified 04/20/2022    Acute non-recurrent maxillary sinusitis    Acute non-recurrent maxillary sinusitis 02/13/2023    Anxiety     Arthritis     Chronic sinusitis, unspecified 12/16/2020    Sinobronchitis    High cholesterol     Hypertension     Pain, unspecified     Pain    Personal history of other specified conditions 05/27/2015    History of abnormal mammogram    Snores     Vertigo     Wears glasses     reading       Past Surgical History:   Procedure Laterality Date    BREAST BIOPSY Right     CHOLECYSTECTOMY  02/09/2015    COLONOSCOPY      ESOPHAGOGASTRODUODENOSCOPY      HYSTERECTOMY  02/09/2015    MOUTH SURGERY      tooth removed        reports that she has never smoked. She has been exposed to tobacco smoke. She has never used smokeless tobacco. She reports that she does not drink alcohol and does not use drugs.    Review of Systems  Review of Systems   Constitutional: Negative.    HENT:  Positive for congestion, ear pain and sore throat.    Eyes: Negative.    Respiratory:  Positive for cough.    Cardiovascular:  Negative for chest pain and palpitations.   Gastrointestinal: Negative.    Endocrine: Negative.    Genitourinary: Negative.    Musculoskeletal: Negative.    Skin: Negative.    Allergic/Immunologic: Negative.    Neurological: Negative.    Hematological: Negative.    Psychiatric/Behavioral:  "Negative.     All other systems reviewed and are negative.                                 Objective    Vitals:    03/15/25 1245   BP: 112/73   BP Location: Left arm   Patient Position: Sitting   Pulse: 77   Resp: 18   Temp: 37.4 °C (99.4 °F)   TempSrc: Oral   SpO2: 95%   Weight: 95.7 kg (211 lb)   Height: 1.575 m (5' 2\")     No LMP recorded. Patient has had a hysterectomy.    Physical Exam  Vitals and nursing note reviewed.   Constitutional:       General: She is not in acute distress.     Appearance: Normal appearance. She is not ill-appearing or toxic-appearing.   HENT:      Head: Atraumatic.      Right Ear: External ear normal. There is no impacted cerumen. Tympanic membrane is erythematous and bulging.      Left Ear: External ear normal. There is no impacted cerumen. Tympanic membrane is erythematous and bulging.      Nose: Congestion present.      Mouth/Throat:      Mouth: Mucous membranes are moist.      Pharynx: Oropharynx is clear. Posterior oropharyngeal erythema present.   Eyes:      Extraocular Movements: Extraocular movements intact.      Conjunctiva/sclera: Conjunctivae normal.      Pupils: Pupils are equal, round, and reactive to light.   Cardiovascular:      Rate and Rhythm: Normal rate.   Pulmonary:      Effort: Pulmonary effort is normal.   Skin:     General: Skin is warm and dry.   Neurological:      General: No focal deficit present.      Mental Status: She is alert and oriented to person, place, and time.   Psychiatric:         Mood and Affect: Mood normal.         Behavior: Behavior normal.         Thought Content: Thought content normal.         Procedures    Point of Care Test & Imaging Results from this visit  Results for orders placed or performed in visit on 03/15/25   POCT SPOTFIRE R/ST Panel Mini w/Strep A ([facility]) manually resulted   Result Value Ref Range    POC Group A Strep, PCR Negative Negative    POC Respiratory Syncytial Virus PCR Negative Negative    POC Influenza A Virus " PCR Negative Negative    POC Influenza B Virus PCR Negative Negative    POC Human Rhinovirus PCR Negative Negative      No results found.    Diagnostic study results (if any) were reviewed by ABHISHEK Brown.    Assessment/Plan   Allergies, medications, history, and pertinent labs/EKGs/Imaging reviewed by ABHISHEK Brown.     Medical Decision Making  Spot fire test in urgent care today is negative for strep, Influenza A/B, Rhinovirus, RSV. Bilat TM red, erythematous.  Pt noted to have posterior oropharynx erythema.  No tonsillar exudate or swelling noted.  Airway is patent.  Patient is without drooling, stridor, trismus.   Suspect otitis media as cause of pt's symptoms.  Will dc home with Rx augmentin, bromfed, viscous lido for tongue sores.  At time of discharge patient was clinically well-appearing and HDS for outpatient management. The patient was educated regarding diagnosis, supportive care, OTC and Rx medications. The patient was given the opportunity to ask questions prior to discharge.  They verbalized understanding of my discussion of the plans for treatment, expected course, indications to return to  or seek further evaluation in ED, and the need for timely follow up as directed.   They were provided with a work/school excuse if requested.         Orders and Diagnoses  Diagnoses and all orders for this visit:  Acute otitis media, unspecified otitis media type  -     amoxicillin-pot clavulanate (Augmentin) 875-125 mg tablet; Take 1 tablet by mouth 2 times a day for 10 days.  Sore throat  -     POCT SPOTFIRE R/ST Panel Mini w/Strep A ([facility]) manually resulted  Acute cough  -     brompheniramine-pseudoeph-DM 2-30-10 mg/5 mL syrup; Take 5 mL by mouth 4 times a day as needed for cough, allergies or congestion for up to 10 days.  Stomatitis  -     lidocaine (Xylocaine) 2 % solution; Take 1.25 mL by mouth every 6 hours if needed for mild pain (1 - 3) or moderate pain (4 - 6) for up to  10 days.      Medical Admin Record      Patient disposition: Home    Electronically signed by ABHISHEK Brown  6:53 PM

## 2025-03-17 ENCOUNTER — HOSPITAL ENCOUNTER (EMERGENCY)
Facility: HOSPITAL | Age: 70
Discharge: HOME | DRG: 871 | End: 2025-03-17
Attending: STUDENT IN AN ORGANIZED HEALTH CARE EDUCATION/TRAINING PROGRAM
Payer: MEDICARE

## 2025-03-17 ENCOUNTER — APPOINTMENT (OUTPATIENT)
Dept: CARDIOLOGY | Facility: HOSPITAL | Age: 70
DRG: 871 | End: 2025-03-17
Payer: MEDICARE

## 2025-03-17 ENCOUNTER — APPOINTMENT (OUTPATIENT)
Dept: RADIOLOGY | Facility: HOSPITAL | Age: 70
DRG: 871 | End: 2025-03-17
Payer: MEDICARE

## 2025-03-17 ENCOUNTER — HOSPITAL ENCOUNTER (INPATIENT)
Facility: HOSPITAL | Age: 70
DRG: 871 | End: 2025-03-17
Attending: STUDENT IN AN ORGANIZED HEALTH CARE EDUCATION/TRAINING PROGRAM | Admitting: HOSPITALIST
Payer: MEDICARE

## 2025-03-17 ENCOUNTER — TELEPHONE (OUTPATIENT)
Dept: PRIMARY CARE | Facility: CLINIC | Age: 70
End: 2025-03-17
Payer: MEDICARE

## 2025-03-17 VITALS
HEIGHT: 60 IN | OXYGEN SATURATION: 97 % | WEIGHT: 211 LBS | BODY MASS INDEX: 41.43 KG/M2 | RESPIRATION RATE: 18 BRPM | TEMPERATURE: 97 F | HEART RATE: 78 BPM | DIASTOLIC BLOOD PRESSURE: 74 MMHG | SYSTOLIC BLOOD PRESSURE: 127 MMHG

## 2025-03-17 DIAGNOSIS — I51.4 MYOCARDITIS, UNSPECIFIED: ICD-10-CM

## 2025-03-17 DIAGNOSIS — N17.9 AKI (ACUTE KIDNEY INJURY): ICD-10-CM

## 2025-03-17 DIAGNOSIS — R51.9 ACUTE NONINTRACTABLE HEADACHE, UNSPECIFIED HEADACHE TYPE: Primary | ICD-10-CM

## 2025-03-17 DIAGNOSIS — N39.0 URINARY TRACT INFECTION WITH HEMATURIA, SITE UNSPECIFIED: ICD-10-CM

## 2025-03-17 DIAGNOSIS — R06.82 TACHYPNEA, NOT ELSEWHERE CLASSIFIED: ICD-10-CM

## 2025-03-17 DIAGNOSIS — N17.9 AKI (ACUTE KIDNEY INJURY): Primary | ICD-10-CM

## 2025-03-17 DIAGNOSIS — E87.6 HYPOKALEMIA: ICD-10-CM

## 2025-03-17 DIAGNOSIS — N20.0 NEPHROLITHIASIS: ICD-10-CM

## 2025-03-17 DIAGNOSIS — R31.9 URINARY TRACT INFECTION WITH HEMATURIA, SITE UNSPECIFIED: ICD-10-CM

## 2025-03-17 LAB
ALBUMIN SERPL BCP-MCNC: 3.1 G/DL (ref 3.4–5)
ALBUMIN SERPL BCP-MCNC: 3.4 G/DL (ref 3.4–5)
ALP SERPL-CCNC: 213 U/L (ref 33–136)
ALP SERPL-CCNC: 237 U/L (ref 33–136)
ALT SERPL W P-5'-P-CCNC: 49 U/L (ref 7–45)
ALT SERPL W P-5'-P-CCNC: 53 U/L (ref 7–45)
ANION GAP BLDA CALCULATED.4IONS-SCNC: 14 MMO/L (ref 10–25)
ANION GAP SERPL CALC-SCNC: 10 MMOL/L (ref 10–20)
ANION GAP SERPL CALC-SCNC: 14 MMOL/L (ref 10–20)
APPEARANCE UR: CLEAR
ARTERIAL PATENCY WRIST A: ABNORMAL
AST SERPL W P-5'-P-CCNC: 49 U/L (ref 9–39)
AST SERPL W P-5'-P-CCNC: 59 U/L (ref 9–39)
BASE EXCESS BLDA CALC-SCNC: -3 MMOL/L (ref -2–3)
BASOPHILS # BLD AUTO: 0.04 X10*3/UL (ref 0–0.1)
BASOPHILS NFR BLD AUTO: 0.3 %
BILIRUB SERPL-MCNC: 0.7 MG/DL (ref 0–1.2)
BILIRUB SERPL-MCNC: 0.8 MG/DL (ref 0–1.2)
BILIRUB UR STRIP.AUTO-MCNC: NEGATIVE MG/DL
BODY TEMPERATURE: ABNORMAL
BUN SERPL-MCNC: 17 MG/DL (ref 6–23)
BUN SERPL-MCNC: 18 MG/DL (ref 6–23)
CA-I BLDA-SCNC: 1.19 MMOL/L (ref 1.1–1.33)
CALCIUM SERPL-MCNC: 8.6 MG/DL (ref 8.6–10.3)
CALCIUM SERPL-MCNC: 9 MG/DL (ref 8.6–10.3)
CHLORIDE BLDA-SCNC: 101 MMOL/L (ref 98–107)
CHLORIDE SERPL-SCNC: 100 MMOL/L (ref 98–107)
CHLORIDE SERPL-SCNC: 97 MMOL/L (ref 98–107)
CO2 SERPL-SCNC: 25 MMOL/L (ref 21–32)
CO2 SERPL-SCNC: 26 MMOL/L (ref 21–32)
COLOR UR: YELLOW
CREAT SERPL-MCNC: 1.56 MG/DL (ref 0.5–1.05)
CREAT SERPL-MCNC: 1.61 MG/DL (ref 0.5–1.05)
EGFRCR SERPLBLD CKD-EPI 2021: 34 ML/MIN/1.73M*2
EGFRCR SERPLBLD CKD-EPI 2021: 36 ML/MIN/1.73M*2
EOSINOPHIL # BLD AUTO: 0.01 X10*3/UL (ref 0–0.7)
EOSINOPHIL NFR BLD AUTO: 0.1 %
ERYTHROCYTE [DISTWIDTH] IN BLOOD BY AUTOMATED COUNT: 13.9 % (ref 11.5–14.5)
FLUAV RNA RESP QL NAA+PROBE: NOT DETECTED
FLUBV RNA RESP QL NAA+PROBE: NOT DETECTED
GLUCOSE BLD MANUAL STRIP-MCNC: 131 MG/DL (ref 74–99)
GLUCOSE BLDA-MCNC: 140 MG/DL (ref 74–99)
GLUCOSE SERPL-MCNC: 116 MG/DL (ref 74–99)
GLUCOSE SERPL-MCNC: 156 MG/DL (ref 74–99)
GLUCOSE UR STRIP.AUTO-MCNC: NORMAL MG/DL
HCO3 BLDA-SCNC: 18.4 MMOL/L (ref 22–26)
HCT VFR BLD AUTO: 33.7 % (ref 36–46)
HCT VFR BLD EST: 36 % (ref 36–46)
HGB BLD-MCNC: 11.8 G/DL (ref 12–16)
HGB BLDA-MCNC: 12 G/DL (ref 12–16)
IMM GRANULOCYTES # BLD AUTO: 0.19 X10*3/UL (ref 0–0.7)
IMM GRANULOCYTES NFR BLD AUTO: 1.4 % (ref 0–0.9)
INHALED O2 CONCENTRATION: 32 %
KETONES UR STRIP.AUTO-MCNC: NEGATIVE MG/DL
LACTATE BLDA-SCNC: 2.6 MMOL/L (ref 0.4–2)
LACTATE SERPL-SCNC: 2.6 MMOL/L (ref 0.4–2)
LEUKOCYTE ESTERASE UR QL STRIP.AUTO: ABNORMAL
LYMPHOCYTES # BLD AUTO: 0.97 X10*3/UL (ref 1.2–4.8)
LYMPHOCYTES NFR BLD AUTO: 7.2 %
MAGNESIUM SERPL-MCNC: 2.01 MG/DL (ref 1.6–2.4)
MCH RBC QN AUTO: 30.1 PG (ref 26–34)
MCHC RBC AUTO-ENTMCNC: 35 G/DL (ref 32–36)
MCV RBC AUTO: 86 FL (ref 80–100)
MONOCYTES # BLD AUTO: 0.73 X10*3/UL (ref 0.1–1)
MONOCYTES NFR BLD AUTO: 5.4 %
NEUTROPHILS # BLD AUTO: 11.49 X10*3/UL (ref 1.2–7.7)
NEUTROPHILS NFR BLD AUTO: 85.6 %
NITRITE UR QL STRIP.AUTO: NEGATIVE
NRBC BLD-RTO: 0 /100 WBCS (ref 0–0)
OXYHGB MFR BLDA: 96 % (ref 94–98)
PCO2 BLDA: 23 MM HG (ref 38–42)
PH BLDA: 7.51 PH (ref 7.38–7.42)
PH UR STRIP.AUTO: 6 [PH]
PLATELET # BLD AUTO: 255 X10*3/UL (ref 150–450)
PO2 BLDA: 75 MM HG (ref 85–95)
POTASSIUM BLDA-SCNC: 3.3 MMOL/L (ref 3.5–5.3)
POTASSIUM SERPL-SCNC: 2.7 MMOL/L (ref 3.5–5.3)
POTASSIUM SERPL-SCNC: 3.3 MMOL/L (ref 3.5–5.3)
PROT SERPL-MCNC: 6.6 G/DL (ref 6.4–8.2)
PROT SERPL-MCNC: 7.4 G/DL (ref 6.4–8.2)
PROT UR STRIP.AUTO-MCNC: ABNORMAL MG/DL
RBC # BLD AUTO: 3.92 X10*6/UL (ref 4–5.2)
RBC # UR STRIP.AUTO: ABNORMAL MG/DL
RBC #/AREA URNS AUTO: ABNORMAL /HPF
SAO2 % BLDA: 98 % (ref 94–100)
SARS-COV-2 RNA RESP QL NAA+PROBE: NOT DETECTED
SITE OF ARTERIAL PUNCTURE: ABNORMAL
SODIUM BLDA-SCNC: 130 MMOL/L (ref 136–145)
SODIUM SERPL-SCNC: 133 MMOL/L (ref 136–145)
SODIUM SERPL-SCNC: 133 MMOL/L (ref 136–145)
SP GR UR STRIP.AUTO: 1.04
UROBILINOGEN UR STRIP.AUTO-MCNC: ABNORMAL MG/DL
WBC # BLD AUTO: 13.4 X10*3/UL (ref 4.4–11.3)
WBC #/AREA URNS AUTO: >50 /HPF
WBC CLUMPS #/AREA URNS AUTO: ABNORMAL /HPF

## 2025-03-17 PROCEDURE — 83605 ASSAY OF LACTIC ACID: CPT | Performed by: STUDENT IN AN ORGANIZED HEALTH CARE EDUCATION/TRAINING PROGRAM

## 2025-03-17 PROCEDURE — 70496 CT ANGIOGRAPHY HEAD: CPT | Performed by: RADIOLOGY

## 2025-03-17 PROCEDURE — 2550000001 HC RX 255 CONTRASTS: Performed by: STUDENT IN AN ORGANIZED HEALTH CARE EDUCATION/TRAINING PROGRAM

## 2025-03-17 PROCEDURE — 96366 THER/PROPH/DIAG IV INF ADDON: CPT

## 2025-03-17 PROCEDURE — 36600 WITHDRAWAL OF ARTERIAL BLOOD: CPT

## 2025-03-17 PROCEDURE — 82947 ASSAY GLUCOSE BLOOD QUANT: CPT

## 2025-03-17 PROCEDURE — 99223 1ST HOSP IP/OBS HIGH 75: CPT | Performed by: NURSE PRACTITIONER

## 2025-03-17 PROCEDURE — P9612 CATHETERIZE FOR URINE SPEC: HCPCS

## 2025-03-17 PROCEDURE — 87086 URINE CULTURE/COLONY COUNT: CPT | Mod: ELYLAB | Performed by: STUDENT IN AN ORGANIZED HEALTH CARE EDUCATION/TRAINING PROGRAM

## 2025-03-17 PROCEDURE — 70498 CT ANGIOGRAPHY NECK: CPT

## 2025-03-17 PROCEDURE — 82435 ASSAY OF BLOOD CHLORIDE: CPT | Performed by: STUDENT IN AN ORGANIZED HEALTH CARE EDUCATION/TRAINING PROGRAM

## 2025-03-17 PROCEDURE — 71045 X-RAY EXAM CHEST 1 VIEW: CPT

## 2025-03-17 PROCEDURE — 96375 TX/PRO/DX INJ NEW DRUG ADDON: CPT | Mod: 59

## 2025-03-17 PROCEDURE — 36415 COLL VENOUS BLD VENIPUNCTURE: CPT | Performed by: STUDENT IN AN ORGANIZED HEALTH CARE EDUCATION/TRAINING PROGRAM

## 2025-03-17 PROCEDURE — 80053 COMPREHEN METABOLIC PANEL: CPT | Performed by: STUDENT IN AN ORGANIZED HEALTH CARE EDUCATION/TRAINING PROGRAM

## 2025-03-17 PROCEDURE — 71045 X-RAY EXAM CHEST 1 VIEW: CPT | Performed by: STUDENT IN AN ORGANIZED HEALTH CARE EDUCATION/TRAINING PROGRAM

## 2025-03-17 PROCEDURE — 83735 ASSAY OF MAGNESIUM: CPT | Performed by: STUDENT IN AN ORGANIZED HEALTH CARE EDUCATION/TRAINING PROGRAM

## 2025-03-17 PROCEDURE — 85025 COMPLETE CBC W/AUTO DIFF WBC: CPT | Performed by: STUDENT IN AN ORGANIZED HEALTH CARE EDUCATION/TRAINING PROGRAM

## 2025-03-17 PROCEDURE — 93005 ELECTROCARDIOGRAM TRACING: CPT

## 2025-03-17 PROCEDURE — 2500000001 HC RX 250 WO HCPCS SELF ADMINISTERED DRUGS (ALT 637 FOR MEDICARE OP): Performed by: STUDENT IN AN ORGANIZED HEALTH CARE EDUCATION/TRAINING PROGRAM

## 2025-03-17 PROCEDURE — 81001 URINALYSIS AUTO W/SCOPE: CPT | Performed by: STUDENT IN AN ORGANIZED HEALTH CARE EDUCATION/TRAINING PROGRAM

## 2025-03-17 PROCEDURE — 84075 ASSAY ALKALINE PHOSPHATASE: CPT | Performed by: STUDENT IN AN ORGANIZED HEALTH CARE EDUCATION/TRAINING PROGRAM

## 2025-03-17 PROCEDURE — 87040 BLOOD CULTURE FOR BACTERIA: CPT | Mod: ELYLAB | Performed by: STUDENT IN AN ORGANIZED HEALTH CARE EDUCATION/TRAINING PROGRAM

## 2025-03-17 PROCEDURE — 99285 EMERGENCY DEPT VISIT HI MDM: CPT | Mod: 25 | Performed by: STUDENT IN AN ORGANIZED HEALTH CARE EDUCATION/TRAINING PROGRAM

## 2025-03-17 PROCEDURE — 2060000001 HC INTERMEDIATE ICU ROOM DAILY

## 2025-03-17 PROCEDURE — 96365 THER/PROPH/DIAG IV INF INIT: CPT | Mod: 59

## 2025-03-17 PROCEDURE — 2500000004 HC RX 250 GENERAL PHARMACY W/ HCPCS (ALT 636 FOR OP/ED): Performed by: STUDENT IN AN ORGANIZED HEALTH CARE EDUCATION/TRAINING PROGRAM

## 2025-03-17 PROCEDURE — 96361 HYDRATE IV INFUSION ADD-ON: CPT

## 2025-03-17 PROCEDURE — 87636 SARSCOV2 & INF A&B AMP PRB: CPT | Performed by: STUDENT IN AN ORGANIZED HEALTH CARE EDUCATION/TRAINING PROGRAM

## 2025-03-17 PROCEDURE — 70498 CT ANGIOGRAPHY NECK: CPT | Performed by: RADIOLOGY

## 2025-03-17 PROCEDURE — 96365 THER/PROPH/DIAG IV INF INIT: CPT

## 2025-03-17 RX ORDER — ACETAMINOPHEN 325 MG/1
975 TABLET ORAL ONCE
Status: COMPLETED | OUTPATIENT
Start: 2025-03-17 | End: 2025-03-17

## 2025-03-17 RX ORDER — METOCLOPRAMIDE HYDROCHLORIDE 5 MG/ML
10 INJECTION INTRAMUSCULAR; INTRAVENOUS ONCE
Status: COMPLETED | OUTPATIENT
Start: 2025-03-17 | End: 2025-03-17

## 2025-03-17 RX ORDER — VANCOMYCIN HYDROCHLORIDE 1 G/20ML
INJECTION, POWDER, LYOPHILIZED, FOR SOLUTION INTRAVENOUS DAILY PRN
Status: DISCONTINUED | OUTPATIENT
Start: 2025-03-17 | End: 2025-03-20

## 2025-03-17 RX ORDER — DIPHENHYDRAMINE HYDROCHLORIDE 50 MG/ML
25 INJECTION, SOLUTION INTRAMUSCULAR; INTRAVENOUS ONCE
Status: COMPLETED | OUTPATIENT
Start: 2025-03-17 | End: 2025-03-17

## 2025-03-17 RX ORDER — POTASSIUM CHLORIDE 750 MG/1
10 TABLET, FILM COATED, EXTENDED RELEASE ORAL DAILY
Qty: 4 TABLET | Refills: 0 | Status: SHIPPED | OUTPATIENT
Start: 2025-03-17 | End: 2025-03-26 | Stop reason: HOSPADM

## 2025-03-17 RX ORDER — ACETAMINOPHEN 10 MG/ML
1000 INJECTION, SOLUTION INTRAVENOUS ONCE
Status: COMPLETED | OUTPATIENT
Start: 2025-03-17 | End: 2025-03-17

## 2025-03-17 RX ORDER — POTASSIUM CHLORIDE 14.9 MG/ML
20 INJECTION INTRAVENOUS ONCE
Status: COMPLETED | OUTPATIENT
Start: 2025-03-17 | End: 2025-03-17

## 2025-03-17 RX ORDER — POTASSIUM CHLORIDE 1.5 G/1.58G
40 POWDER, FOR SOLUTION ORAL ONCE
Status: COMPLETED | OUTPATIENT
Start: 2025-03-17 | End: 2025-03-17

## 2025-03-17 RX ADMIN — ACETAMINOPHEN 1000 MG: 10 INJECTION, SOLUTION INTRAVENOUS at 23:11

## 2025-03-17 RX ADMIN — POTASSIUM CHLORIDE 40 MEQ: 1.5 POWDER, FOR SOLUTION ORAL at 13:41

## 2025-03-17 RX ADMIN — SODIUM CHLORIDE, POTASSIUM CHLORIDE, SODIUM LACTATE AND CALCIUM CHLORIDE 1000 ML: 600; 310; 30; 20 INJECTION, SOLUTION INTRAVENOUS at 12:28

## 2025-03-17 RX ADMIN — SODIUM CHLORIDE, POTASSIUM CHLORIDE, SODIUM LACTATE AND CALCIUM CHLORIDE 1000 ML: 600; 310; 30; 20 INJECTION, SOLUTION INTRAVENOUS at 23:17

## 2025-03-17 RX ADMIN — VANCOMYCIN HYDROCHLORIDE 2000 MG: 5 INJECTION, POWDER, LYOPHILIZED, FOR SOLUTION INTRAVENOUS at 23:48

## 2025-03-17 RX ADMIN — METOCLOPRAMIDE HYDROCHLORIDE 10 MG: 5 INJECTION INTRAMUSCULAR; INTRAVENOUS at 12:55

## 2025-03-17 RX ADMIN — AMPICILLIN SODIUM 2 G: 2 INJECTION, POWDER, FOR SOLUTION INTRAMUSCULAR; INTRAVENOUS at 23:58

## 2025-03-17 RX ADMIN — SODIUM CHLORIDE, POTASSIUM CHLORIDE, SODIUM LACTATE AND CALCIUM CHLORIDE 500 ML: 600; 310; 30; 20 INJECTION, SOLUTION INTRAVENOUS at 15:34

## 2025-03-17 RX ADMIN — IOHEXOL 75 ML: 350 INJECTION, SOLUTION INTRAVENOUS at 15:18

## 2025-03-17 RX ADMIN — DIPHENHYDRAMINE HYDROCHLORIDE 25 MG: 50 INJECTION, SOLUTION INTRAMUSCULAR; INTRAVENOUS at 12:55

## 2025-03-17 RX ADMIN — SODIUM CHLORIDE, POTASSIUM CHLORIDE, SODIUM LACTATE AND CALCIUM CHLORIDE 710 ML: 600; 310; 30; 20 INJECTION, SOLUTION INTRAVENOUS at 23:24

## 2025-03-17 RX ADMIN — DEXTROSE MONOHYDRATE 2 G: 5 INJECTION INTRAVENOUS at 23:14

## 2025-03-17 RX ADMIN — ACETAMINOPHEN 975 MG: 325 TABLET ORAL at 12:28

## 2025-03-17 RX ADMIN — ACYCLOVIR SODIUM 230 MG: 50 INJECTION, SOLUTION INTRAVENOUS at 23:32

## 2025-03-17 RX ADMIN — POTASSIUM CHLORIDE 20 MEQ: 14.9 INJECTION, SOLUTION INTRAVENOUS at 13:41

## 2025-03-17 ASSESSMENT — PAIN SCALES - GENERAL
PAINLEVEL_OUTOF10: 4
PAINLEVEL_OUTOF10: 4
PAINLEVEL_OUTOF10: 0 - NO PAIN
PAINLEVEL_OUTOF10: 10 - WORST POSSIBLE PAIN

## 2025-03-17 ASSESSMENT — PAIN DESCRIPTION - PAIN TYPE: TYPE: ACUTE PAIN

## 2025-03-17 ASSESSMENT — COLUMBIA-SUICIDE SEVERITY RATING SCALE - C-SSRS
6. HAVE YOU EVER DONE ANYTHING, STARTED TO DO ANYTHING, OR PREPARED TO DO ANYTHING TO END YOUR LIFE?: NO
1. IN THE PAST MONTH, HAVE YOU WISHED YOU WERE DEAD OR WISHED YOU COULD GO TO SLEEP AND NOT WAKE UP?: NO
2. HAVE YOU ACTUALLY HAD ANY THOUGHTS OF KILLING YOURSELF?: NO

## 2025-03-17 ASSESSMENT — PAIN - FUNCTIONAL ASSESSMENT
PAIN_FUNCTIONAL_ASSESSMENT: 0-10

## 2025-03-17 ASSESSMENT — LIFESTYLE VARIABLES
HAVE YOU EVER FELT YOU SHOULD CUT DOWN ON YOUR DRINKING: NO
TOTAL SCORE: 0
EVER HAD A DRINK FIRST THING IN THE MORNING TO STEADY YOUR NERVES TO GET RID OF A HANGOVER: NO
EVER HAD A DRINK FIRST THING IN THE MORNING TO STEADY YOUR NERVES TO GET RID OF A HANGOVER: NO
HAVE PEOPLE ANNOYED YOU BY CRITICIZING YOUR DRINKING: NO
HAVE YOU EVER FELT YOU SHOULD CUT DOWN ON YOUR DRINKING: NO
TOTAL SCORE: 0
EVER FELT BAD OR GUILTY ABOUT YOUR DRINKING: NO
HAVE PEOPLE ANNOYED YOU BY CRITICIZING YOUR DRINKING: NO
EVER FELT BAD OR GUILTY ABOUT YOUR DRINKING: NO

## 2025-03-17 ASSESSMENT — PAIN DESCRIPTION - LOCATION: LOCATION: HEAD

## 2025-03-17 NOTE — TELEPHONE ENCOUNTER
Per [facility] pt has been advised to go to ER for a possible head xr or possible additional testing/treatment. Pt agrees with this. She states she has never had a headache like the one she is experiencing.

## 2025-03-17 NOTE — Clinical Note
Report called to PACU.  Patient will transfer to PACU for 2 hours.  Message sent to Kelly Lacey RN on 8S who is caring for patient.

## 2025-03-17 NOTE — Clinical Note
Procedure completed pt tolerated well, 8.5F resolve locking catheter placed without difficulty and fluid sent for culture. Stayfix dressing applied. 21cm at the skin

## 2025-03-17 NOTE — Clinical Note
Pt resting comfortably in bed, waiting for transport, PORSHA Vargas updated, pt taken back to floor via transport.

## 2025-03-17 NOTE — TELEPHONE ENCOUNTER
Pt was seen at urgent care on 3/15/25 and was prescribed an antibiotic for sinus/ear infection and sore throat, which has been somewhat helpful. She was tested at that time for several viruses etc. all of which were neg. At present she has a very bad headache (10/10), extreme exhaustion, severe body aches that feel like she has broken ribs. She was advised by urgent care to contact our office about these latter symptoms. Please advise.

## 2025-03-17 NOTE — ED PROVIDER NOTES
HPI   Chief Complaint   Patient presents with    Headache       Patient is a 69-year-old female with history of hypertension, hyperlipidemia who presents for headache.  Patient states has been having headache since Wednesday, states was gradual onset, 10 out of 10 in severity.  No chest pain, shortness of breath, fevers, chills, cough, runny nose, vomiting, diarrhea.  No changes to vision or hearing, no difficulty walking.  No history of MI, CVA, DVT or PE.  States that is worse with movement and bending over, better when she is lying down.              Patient History   Past Medical History:   Diagnosis Date    Acute maxillary sinusitis, unspecified 04/20/2022    Acute non-recurrent maxillary sinusitis    Acute non-recurrent maxillary sinusitis 02/13/2023    Anxiety     Arthritis     Chronic sinusitis, unspecified 12/16/2020    Sinobronchitis    High cholesterol     Hypertension     Pain, unspecified     Pain    Personal history of other specified conditions 05/27/2015    History of abnormal mammogram    Snores     Vertigo     Wears glasses     reading     Past Surgical History:   Procedure Laterality Date    BREAST BIOPSY Right     CHOLECYSTECTOMY  02/09/2015    COLONOSCOPY      ESOPHAGOGASTRODUODENOSCOPY      HYSTERECTOMY  02/09/2015    MOUTH SURGERY      tooth removed     Family History   Problem Relation Name Age of Onset    Colon cancer Mother [place]     Osteoporosis Mother [place]     Thyroid disease Mother [place]     Heart disease Father Humphrey     Breast cancer Maternal Grandmother Grandma Beth     Heart disease Brother Gustavo Sanon     Heart disease Brother Gustavo Sanon      Social History     Tobacco Use    Smoking status: Never     Passive exposure: Past    Smokeless tobacco: Never   Vaping Use    Vaping status: Never Used   Substance Use Topics    Alcohol use: Never    Drug use: Never       Physical Exam   ED Triage Vitals [03/17/25 1159]   Temperature Heart Rate Respirations BP   37.1 °C (98.8 °F)  (!) 107 20 130/70      Pulse Ox Temp Source Heart Rate Source Patient Position   94 % Temporal Monitor Sitting      BP Location FiO2 (%)     Right arm --       Physical Exam  Constitutional:       General: She is not in acute distress.  HENT:      Head: Normocephalic.   Eyes:      Extraocular Movements: Extraocular movements intact.      Conjunctiva/sclera: Conjunctivae normal.      Pupils: Pupils are equal, round, and reactive to light.   Cardiovascular:      Rate and Rhythm: Normal rate and regular rhythm.      Pulses: Normal pulses.      Heart sounds: Normal heart sounds.   Pulmonary:      Effort: Pulmonary effort is normal.      Breath sounds: Normal breath sounds.   Abdominal:      General: There is no distension.      Palpations: Abdomen is soft. There is no mass.      Tenderness: There is no abdominal tenderness. There is no guarding.   Musculoskeletal:         General: No deformity.      Cervical back: Normal range of motion and neck supple.      Right lower leg: No edema.      Left lower leg: No edema.   Skin:     General: Skin is warm and dry.      Findings: No lesion or rash.   Neurological:      General: No focal deficit present.      Mental Status: She is alert and oriented to person, place, and time. Mental status is at baseline.      Cranial Nerves: No cranial nerve deficit.      Sensory: No sensory deficit.      Motor: No weakness.   Psychiatric:         Mood and Affect: Mood normal.           ED Course & MDM   Diagnoses as of 03/17/25 2028   Acute nonintractable headache, unspecified headache type   HEDY (acute kidney injury) (CMS-HCC)   Hypokalemia                 No data recorded                                 Medical Decision Making  Patient is a 69-year-old female with above-stated past medical tree presents for a headache.  Patient's CT angio did not show signs of intracranial bleeding, or other acute findings.  Patient is aware of the vasculature findings.  On reevaluation, she states she  feels back to her baseline.  I have low suspicion for subarachnoid hemorrhage, CVA, or other acute intracranial process.  Patient's CMP shows a HEDY, potassium of 2.7, elevated LFTs.  Patient is no abdominal pain on exam.  I have low suspicion for acute biliary pathology.  I suspect given her recent headache that she may have suffered from a viral illness.  She was given IV fluids, potassium repletion.  CBC shows a mild leukocytosis, though I have low suspicion for sepsis, her heart rate improved without intervention just after arrival.  CBC shows a borderline anemia, though I have low suspicion for this as a cause for symptoms. Patient's repeat CMP shows a mild improvement in her creatinine, potassium of 3.3, similar LFT results.  I discussed the patient's results with her and recommended admission given the potential for worsening kidney function, though she declines this.  She states she will follow-up with her primary care physician.  Patient has capacity.  She states she feels much improved and would like to go home.  Patient was given a short course of potassium supplements.  I discussed strict return precautions with her.  She stated understanding and agreement.  Patient was discharged home in stable condition.    Disclaimer: This note was dictated using speech recognition software. Minor errors in transcription may be present. Please call if questions.     Eder Landry MD  [facility]  Contact on [employer]        Problems Addressed:  Acute nonintractable headache, unspecified headache type: acute illness or injury  HEDY (acute kidney injury) (CMS-HCC): acute illness or injury  Hypokalemia: acute illness or injury    Amount and/or Complexity of Data Reviewed  Labs: ordered.  Radiology: ordered.        Procedure  Procedures     Eder Landry MD  03/17/25 2031

## 2025-03-18 ENCOUNTER — APPOINTMENT (OUTPATIENT)
Dept: RADIOLOGY | Facility: HOSPITAL | Age: 70
DRG: 871 | End: 2025-03-18
Payer: MEDICARE

## 2025-03-18 LAB
ALBUMIN SERPL BCP-MCNC: 2.8 G/DL (ref 3.4–5)
ALP SERPL-CCNC: 220 U/L (ref 33–136)
ALT SERPL W P-5'-P-CCNC: 66 U/L (ref 7–45)
ANION GAP SERPL CALC-SCNC: 14 MMOL/L (ref 10–20)
AST SERPL W P-5'-P-CCNC: 95 U/L (ref 9–39)
ATRIAL RATE: 100 BPM
BASOPHILS # BLD AUTO: 0.05 X10*3/UL (ref 0–0.1)
BASOPHILS NFR BLD AUTO: 0.4 %
BILIRUB SERPL-MCNC: 1.3 MG/DL (ref 0–1.2)
BUN SERPL-MCNC: 18 MG/DL (ref 6–23)
CALCIUM SERPL-MCNC: 8.4 MG/DL (ref 8.6–10.3)
CHLORIDE SERPL-SCNC: 100 MMOL/L (ref 98–107)
CO2 SERPL-SCNC: 21 MMOL/L (ref 21–32)
CREAT SERPL-MCNC: 1.97 MG/DL (ref 0.5–1.05)
EGFRCR SERPLBLD CKD-EPI 2021: 27 ML/MIN/1.73M*2
EOSINOPHIL # BLD AUTO: 0.02 X10*3/UL (ref 0–0.7)
EOSINOPHIL NFR BLD AUTO: 0.2 %
ERYTHROCYTE [DISTWIDTH] IN BLOOD BY AUTOMATED COUNT: 14.6 % (ref 11.5–14.5)
GLUCOSE SERPL-MCNC: 91 MG/DL (ref 74–99)
HCT VFR BLD AUTO: 29.8 % (ref 36–46)
HGB BLD-MCNC: 10.3 G/DL (ref 12–16)
HOLD SPECIMEN: NORMAL
IMM GRANULOCYTES # BLD AUTO: 0.15 X10*3/UL (ref 0–0.7)
IMM GRANULOCYTES NFR BLD AUTO: 1.2 % (ref 0–0.9)
INR PPP: 1.7 (ref 0.9–1.1)
LACTATE SERPL-SCNC: 2 MMOL/L (ref 0.4–2)
LYMPHOCYTES # BLD AUTO: 0.34 X10*3/UL (ref 1.2–4.8)
LYMPHOCYTES NFR BLD AUTO: 2.8 %
MAGNESIUM SERPL-MCNC: 1.39 MG/DL (ref 1.6–2.4)
MCH RBC QN AUTO: 31.3 PG (ref 26–34)
MCHC RBC AUTO-ENTMCNC: 34.6 G/DL (ref 32–36)
MCV RBC AUTO: 91 FL (ref 80–100)
MONOCYTES # BLD AUTO: 0.26 X10*3/UL (ref 0.1–1)
MONOCYTES NFR BLD AUTO: 2.2 %
NEUTROPHILS # BLD AUTO: 11.2 X10*3/UL (ref 1.2–7.7)
NEUTROPHILS NFR BLD AUTO: 93.2 %
NEUTS VAC BLD QL SMEAR: PRESENT
NRBC BLD-RTO: 0 /100 WBCS (ref 0–0)
P AXIS: 40 DEGREES
P OFFSET: 182 MS
P ONSET: 143 MS
PLATELET # BLD AUTO: 159 X10*3/UL (ref 150–450)
POTASSIUM SERPL-SCNC: 2.5 MMOL/L (ref 3.5–5.3)
PR INTERVAL: 140 MS
PROCALCITONIN SERPL-MCNC: 92.5 NG/ML
PROT SERPL-MCNC: 5.9 G/DL (ref 6.4–8.2)
PROTHROMBIN TIME: 19.1 SECONDS (ref 9.8–12.4)
Q ONSET: 213 MS
QRS COUNT: 16 BEATS
QRS DURATION: 84 MS
QT INTERVAL: 338 MS
QTC CALCULATION(BAZETT): 436 MS
QTC FREDERICIA: 401 MS
R AXIS: -86 DEGREES
RBC # BLD AUTO: 3.29 X10*6/UL (ref 4–5.2)
RBC MORPH BLD: NORMAL
SODIUM SERPL-SCNC: 132 MMOL/L (ref 136–145)
T AXIS: 6 DEGREES
T OFFSET: 382 MS
VANCOMYCIN SERPL-MCNC: 15.8 UG/ML (ref 5–20)
VANCOMYCIN SERPL-MCNC: 23.6 UG/ML (ref 5–20)
VANCOMYCIN SERPL-MCNC: 26 UG/ML (ref 5–20)
VENTRICULAR RATE: 100 BPM
WBC # BLD AUTO: 12 X10*3/UL (ref 4.4–11.3)

## 2025-03-18 PROCEDURE — 36415 COLL VENOUS BLD VENIPUNCTURE: CPT | Performed by: STUDENT IN AN ORGANIZED HEALTH CARE EDUCATION/TRAINING PROGRAM

## 2025-03-18 PROCEDURE — 2500000001 HC RX 250 WO HCPCS SELF ADMINISTERED DRUGS (ALT 637 FOR MEDICARE OP): Performed by: NURSE PRACTITIONER

## 2025-03-18 PROCEDURE — 80202 ASSAY OF VANCOMYCIN: CPT

## 2025-03-18 PROCEDURE — 2500000004 HC RX 250 GENERAL PHARMACY W/ HCPCS (ALT 636 FOR OP/ED): Performed by: INTERNAL MEDICINE

## 2025-03-18 PROCEDURE — 2060000001 HC INTERMEDIATE ICU ROOM DAILY

## 2025-03-18 PROCEDURE — 99233 SBSQ HOSP IP/OBS HIGH 50: CPT | Performed by: INTERNAL MEDICINE

## 2025-03-18 PROCEDURE — 80053 COMPREHEN METABOLIC PANEL: CPT | Performed by: NURSE PRACTITIONER

## 2025-03-18 PROCEDURE — 85025 COMPLETE CBC W/AUTO DIFF WBC: CPT | Performed by: NURSE PRACTITIONER

## 2025-03-18 PROCEDURE — 83605 ASSAY OF LACTIC ACID: CPT | Performed by: STUDENT IN AN ORGANIZED HEALTH CARE EDUCATION/TRAINING PROGRAM

## 2025-03-18 PROCEDURE — 2500000001 HC RX 250 WO HCPCS SELF ADMINISTERED DRUGS (ALT 637 FOR MEDICARE OP): Performed by: INTERNAL MEDICINE

## 2025-03-18 PROCEDURE — 2500000004 HC RX 250 GENERAL PHARMACY W/ HCPCS (ALT 636 FOR OP/ED)

## 2025-03-18 PROCEDURE — 85610 PROTHROMBIN TIME: CPT | Performed by: INTERNAL MEDICINE

## 2025-03-18 PROCEDURE — 36415 COLL VENOUS BLD VENIPUNCTURE: CPT | Performed by: NURSE PRACTITIONER

## 2025-03-18 PROCEDURE — 2500000005 HC RX 250 GENERAL PHARMACY W/O HCPCS

## 2025-03-18 PROCEDURE — 76705 ECHO EXAM OF ABDOMEN: CPT | Performed by: RADIOLOGY

## 2025-03-18 PROCEDURE — 99222 1ST HOSP IP/OBS MODERATE 55: CPT | Performed by: INTERNAL MEDICINE

## 2025-03-18 PROCEDURE — 2500000004 HC RX 250 GENERAL PHARMACY W/ HCPCS (ALT 636 FOR OP/ED): Performed by: NURSE PRACTITIONER

## 2025-03-18 PROCEDURE — 84145 PROCALCITONIN (PCT): CPT | Mod: ELYLAB | Performed by: INTERNAL MEDICINE

## 2025-03-18 PROCEDURE — 83735 ASSAY OF MAGNESIUM: CPT | Performed by: NURSE PRACTITIONER

## 2025-03-18 PROCEDURE — 009U3ZX DRAINAGE OF SPINAL CANAL, PERCUTANEOUS APPROACH, DIAGNOSTIC: ICD-10-PCS | Performed by: RADIOLOGY

## 2025-03-18 PROCEDURE — 76705 ECHO EXAM OF ABDOMEN: CPT

## 2025-03-18 RX ORDER — HEPARIN SODIUM 5000 [USP'U]/ML
7500 INJECTION, SOLUTION INTRAVENOUS; SUBCUTANEOUS EVERY 8 HOURS SCHEDULED
Status: DISCONTINUED | OUTPATIENT
Start: 2025-03-18 | End: 2025-03-26 | Stop reason: HOSPADM

## 2025-03-18 RX ORDER — GUAIFENESIN 600 MG/1
600 TABLET, EXTENDED RELEASE ORAL EVERY 12 HOURS PRN
Status: DISCONTINUED | OUTPATIENT
Start: 2025-03-18 | End: 2025-03-26 | Stop reason: HOSPADM

## 2025-03-18 RX ORDER — ONDANSETRON 4 MG/1
4 TABLET, FILM COATED ORAL EVERY 8 HOURS PRN
Status: DISCONTINUED | OUTPATIENT
Start: 2025-03-18 | End: 2025-03-26 | Stop reason: HOSPADM

## 2025-03-18 RX ORDER — CEFTRIAXONE 1 G/50ML
1 INJECTION, SOLUTION INTRAVENOUS EVERY 24 HOURS
Status: DISCONTINUED | OUTPATIENT
Start: 2025-03-18 | End: 2025-03-18

## 2025-03-18 RX ORDER — VANCOMYCIN HYDROCHLORIDE 1 G/200ML
1000 INJECTION, SOLUTION INTRAVENOUS EVERY 24 HOURS
Status: DISCONTINUED | OUTPATIENT
Start: 2025-03-18 | End: 2025-03-18

## 2025-03-18 RX ORDER — ACETAMINOPHEN 650 MG/1
650 SUPPOSITORY RECTAL EVERY 4 HOURS PRN
Status: DISCONTINUED | OUTPATIENT
Start: 2025-03-18 | End: 2025-03-26 | Stop reason: HOSPADM

## 2025-03-18 RX ORDER — SODIUM CHLORIDE 9 MG/ML
75 INJECTION, SOLUTION INTRAVENOUS CONTINUOUS
Status: ACTIVE | OUTPATIENT
Start: 2025-03-18 | End: 2025-03-19

## 2025-03-18 RX ORDER — ATORVASTATIN CALCIUM 20 MG/1
40 TABLET, FILM COATED ORAL DAILY
Status: DISCONTINUED | OUTPATIENT
Start: 2025-03-18 | End: 2025-03-26 | Stop reason: HOSPADM

## 2025-03-18 RX ORDER — ACETAMINOPHEN 325 MG/1
650 TABLET ORAL EVERY 4 HOURS PRN
Status: DISCONTINUED | OUTPATIENT
Start: 2025-03-18 | End: 2025-03-26 | Stop reason: HOSPADM

## 2025-03-18 RX ORDER — PANTOPRAZOLE SODIUM 40 MG/10ML
40 INJECTION, POWDER, LYOPHILIZED, FOR SOLUTION INTRAVENOUS DAILY
Status: DISCONTINUED | OUTPATIENT
Start: 2025-03-18 | End: 2025-03-20

## 2025-03-18 RX ORDER — VANCOMYCIN HYDROCHLORIDE 750 MG/150ML
750 INJECTION, SOLUTION INTRAVENOUS ONCE
Status: DISCONTINUED | OUTPATIENT
Start: 2025-03-18 | End: 2025-03-18

## 2025-03-18 RX ORDER — METOPROLOL SUCCINATE 50 MG/1
50 TABLET, EXTENDED RELEASE ORAL DAILY
Status: DISCONTINUED | OUTPATIENT
Start: 2025-03-18 | End: 2025-03-26 | Stop reason: HOSPADM

## 2025-03-18 RX ORDER — ASPIRIN 81 MG/1
81 TABLET ORAL DAILY
Status: DISCONTINUED | OUTPATIENT
Start: 2025-03-18 | End: 2025-03-26 | Stop reason: HOSPADM

## 2025-03-18 RX ORDER — PANTOPRAZOLE SODIUM 40 MG/1
40 TABLET, DELAYED RELEASE ORAL DAILY
Status: DISCONTINUED | OUTPATIENT
Start: 2025-03-18 | End: 2025-03-26 | Stop reason: HOSPADM

## 2025-03-18 RX ORDER — ACETAMINOPHEN 325 MG/1
650 TABLET ORAL EVERY 6 HOURS PRN
Status: DISCONTINUED | OUTPATIENT
Start: 2025-03-18 | End: 2025-03-26 | Stop reason: HOSPADM

## 2025-03-18 RX ORDER — VANCOMYCIN HYDROCHLORIDE 1 G/20ML
INJECTION, POWDER, LYOPHILIZED, FOR SOLUTION INTRAVENOUS DAILY PRN
Status: DISCONTINUED | OUTPATIENT
Start: 2025-03-18 | End: 2025-03-18

## 2025-03-18 RX ORDER — ONDANSETRON HYDROCHLORIDE 2 MG/ML
4 INJECTION, SOLUTION INTRAVENOUS EVERY 8 HOURS PRN
Status: DISCONTINUED | OUTPATIENT
Start: 2025-03-18 | End: 2025-03-26 | Stop reason: HOSPADM

## 2025-03-18 RX ORDER — TALC
3 POWDER (GRAM) TOPICAL NIGHTLY PRN
Status: DISCONTINUED | OUTPATIENT
Start: 2025-03-18 | End: 2025-03-20

## 2025-03-18 RX ORDER — POTASSIUM CHLORIDE 14.9 MG/ML
20 INJECTION INTRAVENOUS
Status: COMPLETED | OUTPATIENT
Start: 2025-03-18 | End: 2025-03-18

## 2025-03-18 RX ORDER — ACETAMINOPHEN 160 MG/5ML
650 SOLUTION ORAL EVERY 4 HOURS PRN
Status: DISCONTINUED | OUTPATIENT
Start: 2025-03-18 | End: 2025-03-26 | Stop reason: HOSPADM

## 2025-03-18 RX ADMIN — HEPARIN SODIUM 7500 UNITS: 5000 INJECTION INTRAVENOUS; SUBCUTANEOUS at 05:57

## 2025-03-18 RX ADMIN — SODIUM CHLORIDE 75 ML/HR: 9 INJECTION, SOLUTION INTRAVENOUS at 21:51

## 2025-03-18 RX ADMIN — POTASSIUM CHLORIDE 20 MEQ: 14.9 INJECTION, SOLUTION INTRAVENOUS at 07:25

## 2025-03-18 RX ADMIN — PANTOPRAZOLE SODIUM 40 MG: 40 TABLET, DELAYED RELEASE ORAL at 05:57

## 2025-03-18 RX ADMIN — CEFTRIAXONE SODIUM 2 G: 2 INJECTION, POWDER, FOR SOLUTION INTRAMUSCULAR; INTRAVENOUS at 22:01

## 2025-03-18 RX ADMIN — AMPICILLIN SODIUM 2 G: 2 INJECTION, POWDER, FOR SOLUTION INTRAMUSCULAR; INTRAVENOUS at 18:26

## 2025-03-18 RX ADMIN — ACETAMINOPHEN 650 MG: 325 TABLET ORAL at 20:56

## 2025-03-18 RX ADMIN — POTASSIUM CHLORIDE 20 MEQ: 14.9 INJECTION, SOLUTION INTRAVENOUS at 11:00

## 2025-03-18 RX ADMIN — SODIUM CHLORIDE 75 ML/HR: 9 INJECTION, SOLUTION INTRAVENOUS at 09:02

## 2025-03-18 RX ADMIN — AMPICILLIN SODIUM 2 G: 2 INJECTION, POWDER, FOR SOLUTION INTRAMUSCULAR; INTRAVENOUS at 11:42

## 2025-03-18 RX ADMIN — ACETAMINOPHEN 650 MG: 325 TABLET ORAL at 07:34

## 2025-03-18 SDOH — SOCIAL STABILITY: SOCIAL INSECURITY: HAVE YOU HAD THOUGHTS OF HARMING ANYONE ELSE?: NO

## 2025-03-18 SDOH — ECONOMIC STABILITY: FOOD INSECURITY: WITHIN THE PAST 12 MONTHS, THE FOOD YOU BOUGHT JUST DIDN'T LAST AND YOU DIDN'T HAVE MONEY TO GET MORE.: NEVER TRUE

## 2025-03-18 SDOH — SOCIAL STABILITY: SOCIAL INSECURITY: ARE YOU OR HAVE YOU BEEN THREATENED OR ABUSED PHYSICALLY, EMOTIONALLY, OR SEXUALLY BY ANYONE?: YES

## 2025-03-18 SDOH — SOCIAL STABILITY: SOCIAL INSECURITY
WITHIN THE LAST YEAR, HAVE YOU BEEN RAPED OR FORCED TO HAVE ANY KIND OF SEXUAL ACTIVITY BY YOUR PARTNER OR EX-PARTNER?: NO

## 2025-03-18 SDOH — SOCIAL STABILITY: SOCIAL INSECURITY: WITHIN THE LAST YEAR, HAVE YOU BEEN HUMILIATED OR EMOTIONALLY ABUSED IN OTHER WAYS BY YOUR PARTNER OR EX-PARTNER?: NO

## 2025-03-18 SDOH — SOCIAL STABILITY: SOCIAL INSECURITY: HAS ANYONE EVER THREATENED TO HURT YOUR FAMILY OR YOUR PETS?: NO

## 2025-03-18 SDOH — SOCIAL STABILITY: SOCIAL INSECURITY: DO YOU FEEL UNSAFE GOING BACK TO THE PLACE WHERE YOU ARE LIVING?: NO

## 2025-03-18 SDOH — SOCIAL STABILITY: SOCIAL INSECURITY: ARE THERE ANY APPARENT SIGNS OF INJURIES/BEHAVIORS THAT COULD BE RELATED TO ABUSE/NEGLECT?: NO

## 2025-03-18 SDOH — SOCIAL STABILITY: SOCIAL INSECURITY: WITHIN THE LAST YEAR, HAVE YOU BEEN AFRAID OF YOUR PARTNER OR EX-PARTNER?: NO

## 2025-03-18 SDOH — SOCIAL STABILITY: SOCIAL INSECURITY: DOES ANYONE TRY TO KEEP YOU FROM HAVING/CONTACTING OTHER FRIENDS OR DOING THINGS OUTSIDE YOUR HOME?: NO

## 2025-03-18 SDOH — SOCIAL STABILITY: SOCIAL INSECURITY
WITHIN THE LAST YEAR, HAVE YOU BEEN KICKED, HIT, SLAPPED, OR OTHERWISE PHYSICALLY HURT BY YOUR PARTNER OR EX-PARTNER?: NO

## 2025-03-18 SDOH — ECONOMIC STABILITY: INCOME INSECURITY: IN THE PAST 12 MONTHS HAS THE ELECTRIC, GAS, OIL, OR WATER COMPANY THREATENED TO SHUT OFF SERVICES IN YOUR HOME?: NO

## 2025-03-18 SDOH — ECONOMIC STABILITY: FOOD INSECURITY: WITHIN THE PAST 12 MONTHS, YOU WORRIED THAT YOUR FOOD WOULD RUN OUT BEFORE YOU GOT THE MONEY TO BUY MORE.: NEVER TRUE

## 2025-03-18 SDOH — HEALTH STABILITY: PHYSICAL HEALTH: ON AVERAGE, HOW MANY MINUTES DO YOU ENGAGE IN EXERCISE AT THIS LEVEL?: 10 MIN

## 2025-03-18 SDOH — HEALTH STABILITY: PHYSICAL HEALTH: ON AVERAGE, HOW MANY DAYS PER WEEK DO YOU ENGAGE IN MODERATE TO STRENUOUS EXERCISE (LIKE A BRISK WALK)?: 2 DAYS

## 2025-03-18 SDOH — SOCIAL STABILITY: SOCIAL INSECURITY: DO YOU FEEL ANYONE HAS EXPLOITED OR TAKEN ADVANTAGE OF YOU FINANCIALLY OR OF YOUR PERSONAL PROPERTY?: NO

## 2025-03-18 SDOH — SOCIAL STABILITY: SOCIAL INSECURITY: ABUSE: ADULT

## 2025-03-18 SDOH — SOCIAL STABILITY: SOCIAL INSECURITY: WERE YOU ABLE TO COMPLETE ALL THE BEHAVIORAL HEALTH SCREENINGS?: YES

## 2025-03-18 SDOH — SOCIAL STABILITY: SOCIAL INSECURITY: HAVE YOU HAD ANY THOUGHTS OF HARMING ANYONE ELSE?: YES

## 2025-03-18 ASSESSMENT — LIFESTYLE VARIABLES
SKIP TO QUESTIONS 9-10: 1
HOW OFTEN DO YOU HAVE 6 OR MORE DRINKS ON ONE OCCASION: NEVER
HOW MANY STANDARD DRINKS CONTAINING ALCOHOL DO YOU HAVE ON A TYPICAL DAY: 1 OR 2
AUDIT-C TOTAL SCORE: 1
HOW OFTEN DO YOU HAVE A DRINK CONTAINING ALCOHOL: MONTHLY OR LESS
AUDIT-C TOTAL SCORE: 1

## 2025-03-18 ASSESSMENT — COGNITIVE AND FUNCTIONAL STATUS - GENERAL
STANDING UP FROM CHAIR USING ARMS: A LITTLE
CLIMB 3 TO 5 STEPS WITH RAILING: A LITTLE
TOILETING: A LITTLE
MOVING FROM LYING ON BACK TO SITTING ON SIDE OF FLAT BED WITH BEDRAILS: A LITTLE
DAILY ACTIVITIY SCORE: 23
MOBILITY SCORE: 18
MOVING FROM LYING ON BACK TO SITTING ON SIDE OF FLAT BED WITH BEDRAILS: A LITTLE
WALKING IN HOSPITAL ROOM: A LITTLE
MOVING TO AND FROM BED TO CHAIR: A LITTLE
TURNING FROM BACK TO SIDE WHILE IN FLAT BAD: A LITTLE
MOBILITY SCORE: 18
CLIMB 3 TO 5 STEPS WITH RAILING: A LITTLE
MOBILITY SCORE: 18
MOVING TO AND FROM BED TO CHAIR: A LITTLE
PATIENT BASELINE BEDBOUND: NO
TURNING FROM BACK TO SIDE WHILE IN FLAT BAD: A LITTLE
STANDING UP FROM CHAIR USING ARMS: A LITTLE
MOVING TO AND FROM BED TO CHAIR: A LITTLE
CLIMB 3 TO 5 STEPS WITH RAILING: A LITTLE
DRESSING REGULAR UPPER BODY CLOTHING: A LITTLE
WALKING IN HOSPITAL ROOM: A LITTLE
STANDING UP FROM CHAIR USING ARMS: A LITTLE
MOVING FROM LYING ON BACK TO SITTING ON SIDE OF FLAT BED WITH BEDRAILS: A LITTLE
TURNING FROM BACK TO SIDE WHILE IN FLAT BAD: A LITTLE
DAILY ACTIVITIY SCORE: 23
DAILY ACTIVITIY SCORE: 23
WALKING IN HOSPITAL ROOM: A LITTLE
TOILETING: A LITTLE

## 2025-03-18 ASSESSMENT — PATIENT HEALTH QUESTIONNAIRE - PHQ9
SUM OF ALL RESPONSES TO PHQ9 QUESTIONS 1 & 2: 0
1. LITTLE INTEREST OR PLEASURE IN DOING THINGS: NOT AT ALL
2. FEELING DOWN, DEPRESSED OR HOPELESS: NOT AT ALL

## 2025-03-18 ASSESSMENT — ACTIVITIES OF DAILY LIVING (ADL)
GROOMING: INDEPENDENT
BATHING: INDEPENDENT
DRESSING YOURSELF: INDEPENDENT
FEEDING YOURSELF: INDEPENDENT
HEARING - LEFT EAR: FUNCTIONAL
LACK_OF_TRANSPORTATION: NO
ADEQUATE_TO_COMPLETE_ADL: YES
TOILETING: INDEPENDENT
HEARING - RIGHT EAR: FUNCTIONAL
JUDGMENT_ADEQUATE_SAFELY_COMPLETE_DAILY_ACTIVITIES: NO
PATIENT'S MEMORY ADEQUATE TO SAFELY COMPLETE DAILY ACTIVITIES?: NO
WALKS IN HOME: INDEPENDENT

## 2025-03-18 ASSESSMENT — PAIN SCALES - GENERAL
PAINLEVEL_OUTOF10: 2
PAINLEVEL_OUTOF10: 0 - NO PAIN

## 2025-03-18 NOTE — CARE PLAN
The patient's goals for the shift include      The clinical goals for the shift include Patient will remain hemodynamically stable throughout shift

## 2025-03-18 NOTE — SIGNIFICANT EVENT
RT was called. They said that the rapid respirations were due to the sepsis. Bria Alvarenga NP  Says we will continue to monitor.
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
## 2025-03-18 NOTE — CARE PLAN
The patient's goals for the shift include  to feel better    The clinical goals for the shift include Patient will remain safe throughout shift

## 2025-03-18 NOTE — PROGRESS NOTES
Medical Group Progress Note  ASSESSMENT & PLAN:     Sepsis with metabolic encephalopathy and elevated LFTs  -source either UTI, given headache consideration for meningitis as well  -for now cont abx to cover meningitis including vanc, rocephin, ampicillin  -ID consulted  -tentative plan for LP with IR tomorrow mornign  -follow cultures      Intractable headache  Hx: migraine  Hx: anxiety  - CTA head neck showing developmental abnormality without evidence of infarction or dissection  - consult Neuro   - Consider ordering MRI of brain in a.m. defer to specialist - low suspicion for acute infarction   - regular diet   - pain control  - telemetry overnight     HEDY  Hypokalemia [2.7]   - Baseline SCr 0.85, on admission SCr 1.61, GFR 34  - Replace and recheck electrolytes     Transaminitis  - No complaints of abdominal pain monitor and trend,      - resume home meds once rec complete and clinically appropriate      VTE Prophylaxis: Heparin subcu    3/18/25  Developed very high fever, confusion, tachypnea overnight  These have mostly resolved for now but given sepsis picture cont empiric meningitis coverage for now  Also UTI possible culprit  Await cultures  LP in am, holding heparin tonight  RUQ us for transaminitis; no abd pain, think cholecystitis is probably less likely as source  HEDY worse, start IVF  Replete potassium  Headache is resolved      Glen Felton MD    SUBJECTIVE     Febrile overnight. Feeling better this AM but still rough overall. Headache is resolved. No neck pain or stiffness. No photophobia. No abd pain, n/v/d. No chest pain or dyspnea.     OBJECTIVE:     Last Recorded Vitals:  Vitals:    03/18/25 0458 03/18/25 0730 03/18/25 0818 03/18/25 1048   BP:   95/52 124/59   BP Location:    Left arm   Patient Position:    Lying   Pulse:   96 90   Resp:    20   Temp: 37.7 °C (99.9 °F) 37.4 °C (99.3 °F) 36.5 °C (97.7 °F) 36.8 °C (98.2 °F)   TempSrc: Oral Tympanic  Temporal   SpO2:   92% 93%   Weight:        Height:         Last I/O:  I/O last 3 completed shifts:  In: 1794.8 (18.8 mL/kg) [IV Piggyback:1794.8]  Out: - (0 mL/kg)   Weight: 95.7 kg     Physical Exam  GEN: ill appearing, appears stated age  HEENT: NCAT, PERRLA, EOMI, moist mucous membranes  NECK: supple, no masses  CV: RRR, no m/r/g, no LE edema  LUNGS: CTAB, no w/r/c  ABD: soft, NT, ND, NBS  SKIN: no rashes  MSK; no gross deformities, normal joints  NEURO: A+Ox3, no FND  PSYCH: appropriate mood, affect      Inpatient Medications:  ampicillin, 2 g, intravenous, q8h  [Held by provider] aspirin, 81 mg, oral, Daily  [Held by provider] atorvastatin, 40 mg, oral, Daily  cefTRIAXone, 2 g, intravenous, q24h  [Held by provider] heparin (porcine), 7,500 Units, subcutaneous, q8h ALVIN  [Held by provider] metoprolol succinate XL, 50 mg, oral, Daily  pantoprazole, 40 mg, oral, Daily   Or  pantoprazole, 40 mg, intravenous, Daily  vancomycin, 750 mg, intravenous, Once    PRN Medications  PRN medications: acetaminophen **OR** acetaminophen **OR** acetaminophen, acetaminophen, benzocaine-menthol, guaiFENesin, melatonin, ondansetron **OR** ondansetron, vancomycin, vancomycin  Continuous Medications:  sodium chloride 0.9%, 75 mL/hr, Last Rate: 75 mL/hr (03/18/25 1230)      LABS AND IMAGING:     Labs:  Results from last 7 days   Lab Units 03/18/25  0546 03/17/25  1227   WBC AUTO x10*3/uL 12.0* 13.4*   RBC AUTO x10*6/uL 3.29* 3.92*   HEMOGLOBIN g/dL 10.3* 11.8*   HEMATOCRIT % 29.8* 33.7*   MCV fL 91 86   MCH pg 31.3 30.1   MCHC g/dL 34.6 35.0   RDW % 14.6* 13.9   PLATELETS AUTO x10*3/uL 159 255     Results from last 7 days   Lab Units 03/18/25  0546 03/17/25  1631 03/17/25  1227   SODIUM mmol/L 132* 133* 133*   POTASSIUM mmol/L 2.5* 3.3* 2.7*   CHLORIDE mmol/L 100 100 97*   CO2 mmol/L 21 26 25   BUN mg/dL 18 17 18   CREATININE mg/dL 1.97* 1.56* 1.61*   GLUCOSE mg/dL 91 116* 156*   PROTEIN TOTAL g/dL 5.9* 6.6 7.4   CALCIUM mg/dL 8.4* 8.6 9.0   BILIRUBIN TOTAL mg/dL 1.3* 0.8  0.7   ALK PHOS U/L 220* 213* 237*   AST U/L 95* 59* 49*   ALT U/L 66* 53* 49*     Results from last 7 days   Lab Units 03/18/25  0546 03/17/25  1227   MAGNESIUM mg/dL 1.39* 2.01         Imaging:  ECG 12 lead  Normal sinus rhythm  Left axis deviation  Pulmonary disease pattern  Abnormal ECG  When compared with ECG of 03-MAR-2015 07:44,  Questionable change in QRS axis

## 2025-03-18 NOTE — CONSULTS
Vancomycin Dosing by Pharmacy- INITIAL    Beth Quinones is a 69 y.o. year old female who Pharmacy has been consulted for vancomycin dosing for other uti/sepsis . Based on the patient's indication and renal status this patient will be dosed based on a goal AUC of 400-600.     Renal function is currently poor.    Visit Vitals  /56 (BP Location: Left arm, Patient Position: Lying)   Pulse (!) 124   Temp (!) 38.4 °C (101.1 °F) (Temporal) Comment: Rn notified about temp.   Resp (!) 32 Comment: Rn notified about resp.        Lab Results   Component Value Date    CREATININE 1.56 (H) 2025    CREATININE 1.61 (H) 2025    CREATININE 0.85 2024    CREATININE 1.09 (H) 2022    CREATININE 1.02 2021    CREATININE 0.86 2020        Patient weight is as follows:   Vitals:    25 2105   Weight: 95.7 kg (211 lb)       Cultures:  No results found for the encounter in last 14 days.        No intake/output data recorded.  I/O during current shift:  I/O this shift:  In: 150 [IV Piggyback:150]  Out: -     Temp (24hrs), Av.2 °C (99 °F), Min:36.1 °C (97 °F), Max:38.4 °C (101.1 °F)         Assessment/Plan     Patient has already been given a loading dose of 2000 mg in the ER on 3/17/25 @2348.  Will initiate vancomycin maintenance,  1000 mg every 24 hours.    This dosing regimen is predicted by [employer] to result in the following pharmacokinetic parameters:  Regimen: 1000 mg IV every 24 hours.  Start time: 23:48 on 2025  Exposure target: AUC24 (range)400-600 mg/L.hr   AIU69-05: 560 mg/L.hr  AUC24,ss: 517 mg/L.hr  Probability of AUC24 > 400: 70 %  Ctrough,ss: 14.9 mg/L  Probability of Ctrough,ss > 20: 34 %    Follow-up level will be ordered on 3/18/25 at 0500 and 1000 unless clinically indicated sooner.  Will continue to monitor renal function daily while on vancomycin and order serum creatinine at least every 48 hours if not already ordered.  Follow for continued vancomycin needs, clinical  response, and signs/symptoms of toxicity.       MAGDA PRADO, PharmD

## 2025-03-18 NOTE — PROGRESS NOTES
Vancomycin Dosing by Pharmacy- FOLLOW UP    Beth Quinones is a 69 y.o. year old female who Pharmacy has been consulted for vancomycin dosing for other UTI . Based on the patient's indication and renal status this patient is being dosed based on a goal AUC of 400-600.     Renal function is currently declining.    Current vancomycin dose: 1000 mg given every 24 hours    Estimated vancomycin AUC on current dose: 601 mg/L.hr     Visit Vitals  /59   Pulse 90   Temp 36.8 °C (98.2 °F)   Resp (!) 44 Comment: Rn notified about resp.        Lab Results   Component Value Date    CREATININE 1.97 (H) 2025    CREATININE 1.56 (H) 2025    CREATININE 1.61 (H) 2025    CREATININE 0.85 2024        Patient weight is as follows:   Vitals:    25 210   Weight: 95.7 kg (211 lb)       Cultures:  No results found for the encounter in last 14 days.       I/O last 3 completed shifts:  In: 1794.8 (18.8 mL/kg) [IV Piggyback:1794.8]  Out: - (0 mL/kg)   Weight: 95.7 kg   I/O during current shift:  No intake/output data recorded.    Temp (24hrs), Av.5 °C (99.5 °F), Min:36.1 °C (97 °F), Max:40.4 °C (104.7 °F)      Assessment/Plan    Above goal AUC. Orders placed for new vancomcyin regimen of 750 every 24 hours to begin at 2300.    This dosing regimen is predicted by [employer] to result in the following pharmacokinetic parameters:  Loading dose: N/A  Regimen: 750 mg IV every 24 hours.  Start time: 23:48 on 2025  Exposure target: AUC24 (range)400-600 mg/L.hr   WGR44-05: 536 mg/L.hr  AUC24,ss: 468 mg/L.hr  Probability of AUC24 > 400: 68 %  Ctrough,ss: 14.4 mg/L  Probability of Ctrough,ss > 20: 22 %    The next level will be obtained on 3/18/25 at 2200. May be obtained sooner if clinically indicated.   Will continue to monitor renal function daily while on vancomycin and order serum creatinine at least every 48 hours if not already ordered.  Follow for continued vancomycin needs, clinical response, and  signs/symptoms of toxicity.       Gita Heath, PharmD

## 2025-03-18 NOTE — CONSULTS
Consults      ID Consult:       HPI    Patient presented on 3/17/2025 with intractable headache that started the Wednesday prior to admission with 10 out of 10 severity.  No gait disturbances no fever chills no dizziness no history of intractable headaches no history of CVA DVT or PE per medical record.  Patient was noted to have transaminitis on admission with mild leukocytosis.  Flu and COVID swabs were negative.  Neurology was consulted on admission.  Patient was admitted with sepsis with metabolic encephalopathy and elevated LFTs.  CTA head and neck showed developmental abnormality without evidence of infarction or dissection.  Source was that to be urinary tract infection.  Patient was started on treatment for meningitis including vancomycin Rocephin and ampicillin with tentative plan for lumbar puncture.  She developed a very high fever of 104.7, tachycardia, tachypnea, confusion overnight but headache resolved.    Patient will undergo lumbar puncture and all labs are pending.  Procalcitonin pending.  Urinalysis with greater than 50 WBCs with urine culture pending.  Blood cultures remain negative    Of note, patient visited urgent care on 3/15 with complaint of sores in the mouth - had flesh colored large blisters in the mouth that were very painful. Also had sore throat and bilateral ear pain with slight headache at the time. + mild fevers. She was given augmentin and took 1-2 days worth but clinically worsened despite the antibiotics. Her fevers increased and she subsequently came to the ER. No drainage from the ears and no real congestion. She had a mild cough.     She takes care of her twin two year old grandchildren who also live with her and attend . To her knowledge they have not been ill or fussy but they have been in the .     Her blisters have since gone down in her mouth and the ear pain has diminished. She had headache that is now coming down. Has been having very loose stools and  multiple. She had peak fever of 104.7F and is trending down overall.     Over the past few months her arthritis in her bilateral knees had flared and thumb - she has had a few cortisone injections sine February alone.       All 14 ROS discussed with the patient and negative other than as stated as above       has a past medical history of Acute maxillary sinusitis, unspecified (04/20/2022), Acute non-recurrent maxillary sinusitis (02/13/2023), Anxiety, Arthritis, Chronic sinusitis, unspecified (12/16/2020), High cholesterol, Hypertension, Pain, unspecified, Personal history of other specified conditions (05/27/2015), Snores, Vertigo, and Wears glasses.     has a past surgical history that includes Cholecystectomy (02/09/2015); Hysterectomy (02/09/2015); Breast biopsy (Right); Colonoscopy; Esophagogastroduodenoscopy; and Mouth surgery.     reports that she has never smoked. She has been exposed to tobacco smoke. She has never used smokeless tobacco. She reports that she does not drink alcohol and does not use drugs.    Family History   Problem Relation Name Age of Onset    Colon cancer Mother Shira     Osteoporosis Mother Shira     Thyroid disease Mother Shira     Heart disease Father Humphrey     Breast cancer Maternal Grandmother Grandma Beth     Heart disease Brother Gustavo Sanon     Heart disease Brother Gustavo Sanon          Physical exam  Vitals:    03/18/25 1528   BP: 116/64   Pulse: 84   Resp: 20   Temp: 36.5 °C (97.7 °F)   SpO2: 97%       Patient is awake and alert, no conversational dyspnea. No confusion. Mild cough if any. Nonproductive.   Very dry mouth but no blisters that I can see. No erythema of the throat now.   She can touch her chin to her chest easily and lift her legs without any pain in the back or neck.   NAD  Neck supple  Heart S1S2  Chest: Equal expansion  Abdomen: soft, ND, NTTP, no guarding  Extrem: no pain to palpation  Skin: no rashes, no diaphoresis  Neuro: CNS intact  Affect  appropriate and patient is interactive    Admission on 03/17/2025   Component Date Value Ref Range Status    Flu A Result 03/17/2025 Not Detected  Not Detected Final    Flu B Result 03/17/2025 Not Detected  Not Detected Final    Coronavirus 2019, PCR 03/17/2025 Not Detected  Not Detected Final    Ventricular Rate 03/17/2025 100  BPM Final    Atrial Rate 03/17/2025 100  BPM Final    MS Interval 03/17/2025 140  ms Final    QRS Duration 03/17/2025 84  ms Final    QT Interval 03/17/2025 338  ms Final    QTC Calculation(Bazett) 03/17/2025 436  ms Final    P Axis 03/17/2025 40  degrees Final    R Axis 03/17/2025 -86  degrees Final    T [place] 03/17/2025 6  degrees Final    QRS Count 03/17/2025 16  beats Final    Q Onset 03/17/2025 213  ms Final    P Onset 03/17/2025 143  ms Final    P Offset 03/17/2025 182  ms Final    T Offset 03/17/2025 382  ms Final    QTC Fredericia 03/17/2025 401  ms Final    POCT Glucose 03/17/2025 131 (H)  74 - 99 mg/dL Final    POCT pH, Arterial 03/17/2025 7.51 (H)  7.38 - 7.42 pH Final    POCT pCO2, Arterial 03/17/2025 23 (L)  38 - 42 mm Hg Final    POCT pO2, Arterial 03/17/2025 75 (L)  85 - 95 mm Hg Final    POCT SO2, Arterial 03/17/2025 98  94 - 100 % Final    POCT Oxy Hemoglobin, Arterial 03/17/2025 96.0  94.0 - 98.0 % Final    POCT Hematocrit Calculated, Arteri* 03/17/2025 36.0  36.0 - 46.0 % Final    POCT Sodium, Arterial 03/17/2025 130 (L)  136 - 145 mmol/L Final    POCT Potassium, Arterial 03/17/2025 3.3 (L)  3.5 - 5.3 mmol/L Final    POCT Chloride, Arterial 03/17/2025 101  98 - 107 mmol/L Final    POCT Ionized Calcium, Arterial 03/17/2025 1.19  1.10 - 1.33 mmol/L Final    POCT Glucose, Arterial 03/17/2025 140 (H)  74 - 99 mg/dL Final    POCT Lactate, Arterial 03/17/2025 2.6 (H)  0.4 - 2.0 mmol/L Final    POCT Base Excess, Arterial 03/17/2025 -3.0 (L)  -2.0 - 3.0 mmol/L Final    POCT HCO3 Calculated, Arterial 03/17/2025 18.4 (L)  22.0 - 26.0 mmol/L Final    POCT Hemoglobin, Arterial  03/17/2025 12.0  12.0 - 16.0 g/dL Final    POCT Anion Gap, Arterial 03/17/2025 14  10 - 25 mmo/L Final    Patient Temperature 03/17/2025    Final    NOTE: Patient Results are Not Corrected for Temperature    FiO2 03/17/2025 32  % Final    Site of Arterial Puncture 03/17/2025 RRA   Final    Maurice's Test 03/17/2025 POS   Final    Color, Urine 03/17/2025 Yellow  Light-Yellow, Yellow, Dark-Yellow Final    Appearance, Urine 03/17/2025 Clear  Clear Final    Specific Gravity, Urine 03/17/2025 1.045 (N)  1.005 - 1.035 Final    pH, Urine 03/17/2025 6.0  5.0, 5.5, 6.0, 6.5, 7.0, 7.5, 8.0 Final    Protein, Urine 03/17/2025 100 (2+) (A)  NEGATIVE, 10 (TRACE), 20 (TRACE) mg/dL Final    Glucose, Urine 03/17/2025 Normal  Normal mg/dL Final    Blood, Urine 03/17/2025 1.0 (3+) (A)  NEGATIVE mg/dL Final    Ketones, Urine 03/17/2025 NEGATIVE  NEGATIVE mg/dL Final    Bilirubin, Urine 03/17/2025 NEGATIVE  NEGATIVE mg/dL Final    Urobilinogen, Urine 03/17/2025 2 (1+) (A)  Normal mg/dL Final    Due to a manufacturing issue, low positive urobilinogen results may be falsely positive. Correlate with urine bilirubin and additional clinical/laboratory findings to assess the risk of hemolytic anemia or liver disease. If clinically indicated, repeat testing with an alternate method is available by contacting the laboratory within 24 hours.    Some pigments and medications may cause a false positive urobilinogen.    Nitrite, Urine 03/17/2025 NEGATIVE  NEGATIVE Final    Leukocyte Esterase, Urine 03/17/2025 250 Srinivasa/uL (A)  NEGATIVE Final    Extra Tube 03/17/2025 Hold for add-ons.   Final    Auto resulted.    Blood Culture 03/17/2025 Loaded on Instrument - Culture in progress   Preliminary    Blood Culture 03/17/2025 Loaded on Instrument - Culture in progress   Preliminary    Lactate 03/17/2025 2.6 (H)  0.4 - 2.0 mmol/L Final    WBC, Urine 03/17/2025 >50 (A)  1-5, NONE /HPF Final    WBC Clumps, Urine 03/17/2025 RARE  Reference range not established.  /HPF Final    RBC, Urine 03/17/2025 6-10 (A)  NONE, 1-2, 3-5 /HPF Final    Lactate 03/18/2025 2.0  0.4 - 2.0 mmol/L Final    Glucose 03/18/2025 91  74 - 99 mg/dL Final    Sodium 03/18/2025 132 (L)  136 - 145 mmol/L Final    Potassium 03/18/2025 2.5 (LL)  3.5 - 5.3 mmol/L Final    Chloride 03/18/2025 100  98 - 107 mmol/L Final    Bicarbonate 03/18/2025 21  21 - 32 mmol/L Final    Anion Gap 03/18/2025 14  10 - 20 mmol/L Final    Urea Nitrogen 03/18/2025 18  6 - 23 mg/dL Final    Creatinine 03/18/2025 1.97 (H)  0.50 - 1.05 mg/dL Final    eGFR 03/18/2025 27 (L)  >60 mL/min/1.73m*2 Final    Calculations of estimated GFR are performed using the 2021 CKD-EPI Study Refit equation without the race variable for the IDMS-Traceable creatinine methods.  https://jasn.asnjournals.org/content/early/2021/09/22/ASN.7329355412    Calcium 03/18/2025 8.4 (L)  8.6 - 10.3 mg/dL Final    Albumin 03/18/2025 2.8 (L)  3.4 - 5.0 g/dL Final    Alkaline Phosphatase 03/18/2025 220 (H)  33 - 136 U/L Final    Total Protein 03/18/2025 5.9 (L)  6.4 - 8.2 g/dL Final    AST 03/18/2025 95 (H)  9 - 39 U/L Final    Bilirubin, Total 03/18/2025 1.3 (H)  0.0 - 1.2 mg/dL Final    ALT 03/18/2025 66 (H)  7 - 45 U/L Final    Patients treated with Sulfasalazine may generate falsely decreased results for ALT.    WBC 03/18/2025 12.0 (H)  4.4 - 11.3 x10*3/uL Final    nRBC 03/18/2025 0.0  0.0 - 0.0 /100 WBCs Final    RBC 03/18/2025 3.29 (L)  4.00 - 5.20 x10*6/uL Final    Hemoglobin 03/18/2025 10.3 (L)  12.0 - 16.0 g/dL Final    Hematocrit 03/18/2025 29.8 (L)  36.0 - 46.0 % Final    MCV 03/18/2025 91  80 - 100 fL Final    MCH 03/18/2025 31.3  26.0 - 34.0 pg Final    MCHC 03/18/2025 34.6  32.0 - 36.0 g/dL Final    RDW 03/18/2025 14.6 (H)  11.5 - 14.5 % Final    Platelets 03/18/2025 159  150 - 450 x10*3/uL Final    Neutrophils % 03/18/2025 93.2  40.0 - 80.0 % Final    Immature Granulocytes %, Automated 03/18/2025 1.2 (H)  0.0 - 0.9 % Final    Immature Granulocyte Count  (IG) includes promyelocytes, myelocytes and metamyelocytes but does not include bands. Percent differential counts (%) should be interpreted in the context of the absolute cell counts (cells/UL).    Lymphocytes % 03/18/2025 2.8  13.0 - 44.0 % Final    Monocytes % 03/18/2025 2.2  2.0 - 10.0 % Final    Eosinophils % 03/18/2025 0.2  0.0 - 6.0 % Final    Basophils % 03/18/2025 0.4  0.0 - 2.0 % Final    Neutrophils Absolute 03/18/2025 11.20 (H)  1.20 - 7.70 x10*3/uL Final    Percent differential counts (%) should be interpreted in the context of the absolute cell counts (cells/uL).    Immature Granulocytes Absolute, Au* 03/18/2025 0.15  0.00 - 0.70 x10*3/uL Final    Lymphocytes Absolute 03/18/2025 0.34 (L)  1.20 - 4.80 x10*3/uL Final    Monocytes Absolute 03/18/2025 0.26  0.10 - 1.00 x10*3/uL Final    Eosinophils Absolute 03/18/2025 0.02  0.00 - 0.70 x10*3/uL Final    Basophils Absolute 03/18/2025 0.05  0.00 - 0.10 x10*3/uL Final    Magnesium 03/18/2025 1.39 (L)  1.60 - 2.40 mg/dL Final    Vancomycin 03/18/2025 26.0 (H)  5.0 - 20.0 ug/mL Final    RBC Morphology 03/18/2025 See Below   Final    Vacuolated Neutrophils 03/18/2025 Present   Final    Vancomycin 03/18/2025 23.6 (H)  5.0 - 20.0 ug/mL Final    Protime 03/18/2025 19.1 (H)  9.8 - 12.4 seconds Final    INR 03/18/2025 1.7 (H)  0.9 - 1.1 Final   Admission on 03/17/2025, Discharged on 03/17/2025   Component Date Value Ref Range Status    WBC 03/17/2025 13.4 (H)  4.4 - 11.3 x10*3/uL Final    nRBC 03/17/2025 0.0  0.0 - 0.0 /100 WBCs Final    RBC 03/17/2025 3.92 (L)  4.00 - 5.20 x10*6/uL Final    Hemoglobin 03/17/2025 11.8 (L)  12.0 - 16.0 g/dL Final    Hematocrit 03/17/2025 33.7 (L)  36.0 - 46.0 % Final    MCV 03/17/2025 86  80 - 100 fL Final    MCH 03/17/2025 30.1  26.0 - 34.0 pg Final    MCHC 03/17/2025 35.0  32.0 - 36.0 g/dL Final    RDW 03/17/2025 13.9  11.5 - 14.5 % Final    Platelets 03/17/2025 255  150 - 450 x10*3/uL Final    Neutrophils % 03/17/2025 85.6  40.0 -  80.0 % Final    Immature Granulocytes %, Automated 03/17/2025 1.4 (H)  0.0 - 0.9 % Final    Immature Granulocyte Count (IG) includes promyelocytes, myelocytes and metamyelocytes but does not include bands. Percent differential counts (%) should be interpreted in the context of the absolute cell counts (cells/UL).    Lymphocytes % 03/17/2025 7.2  13.0 - 44.0 % Final    Monocytes % 03/17/2025 5.4  2.0 - 10.0 % Final    Eosinophils % 03/17/2025 0.1  0.0 - 6.0 % Final    Basophils % 03/17/2025 0.3  0.0 - 2.0 % Final    Neutrophils Absolute 03/17/2025 11.49 (H)  1.20 - 7.70 x10*3/uL Final    Percent differential counts (%) should be interpreted in the context of the absolute cell counts (cells/uL).    Immature Granulocytes Absolute, Au* 03/17/2025 0.19  0.00 - 0.70 x10*3/uL Final    Lymphocytes Absolute 03/17/2025 0.97 (L)  1.20 - 4.80 x10*3/uL Final    Monocytes Absolute 03/17/2025 0.73  0.10 - 1.00 x10*3/uL Final    Eosinophils Absolute 03/17/2025 0.01  0.00 - 0.70 x10*3/uL Final    Basophils Absolute 03/17/2025 0.04  0.00 - 0.10 x10*3/uL Final    Glucose 03/17/2025 156 (H)  74 - 99 mg/dL Final    Sodium 03/17/2025 133 (L)  136 - 145 mmol/L Final    Potassium 03/17/2025 2.7 (LL)  3.5 - 5.3 mmol/L Final    Chloride 03/17/2025 97 (L)  98 - 107 mmol/L Final    Bicarbonate 03/17/2025 25  21 - 32 mmol/L Final    Anion Gap 03/17/2025 14  10 - 20 mmol/L Final    Urea Nitrogen 03/17/2025 18  6 - 23 mg/dL Final    Creatinine 03/17/2025 1.61 (H)  0.50 - 1.05 mg/dL Final    eGFR 03/17/2025 34 (L)  >60 mL/min/1.73m*2 Final    Calculations of estimated GFR are performed using the 2021 CKD-EPI Study Refit equation without the race variable for the IDMS-Traceable creatinine methods.  https://jasn.asnjournals.org/content/early/2021/09/22/ASN.5652075824    Calcium 03/17/2025 9.0  8.6 - 10.3 mg/dL Final    Albumin 03/17/2025 3.4  3.4 - 5.0 g/dL Final    Alkaline Phosphatase 03/17/2025 237 (H)  33 - 136 U/L Final    Total Protein  03/17/2025 7.4  6.4 - 8.2 g/dL Final    AST 03/17/2025 49 (H)  9 - 39 U/L Final    Bilirubin, Total 03/17/2025 0.7  0.0 - 1.2 mg/dL Final    ALT 03/17/2025 49 (H)  7 - 45 U/L Final    Patients treated with Sulfasalazine may generate falsely decreased results for ALT.    Magnesium 03/17/2025 2.01  1.60 - 2.40 mg/dL Final    Glucose 03/17/2025 116 (H)  74 - 99 mg/dL Final    Sodium 03/17/2025 133 (L)  136 - 145 mmol/L Final    Potassium 03/17/2025 3.3 (L)  3.5 - 5.3 mmol/L Final    Chloride 03/17/2025 100  98 - 107 mmol/L Final    Bicarbonate 03/17/2025 26  21 - 32 mmol/L Final    Anion Gap 03/17/2025 10  10 - 20 mmol/L Final    Urea Nitrogen 03/17/2025 17  6 - 23 mg/dL Final    Creatinine 03/17/2025 1.56 (H)  0.50 - 1.05 mg/dL Final    eGFR 03/17/2025 36 (L)  >60 mL/min/1.73m*2 Final    Calculations of estimated GFR are performed using the 2021 CKD-EPI Study Refit equation without the race variable for the IDMS-Traceable creatinine methods.  https://jasn.asnjournals.org/content/early/2021/09/22/ASN.1003507682    Calcium 03/17/2025 8.6  8.6 - 10.3 mg/dL Final    Albumin 03/17/2025 3.1 (L)  3.4 - 5.0 g/dL Final    Alkaline Phosphatase 03/17/2025 213 (H)  33 - 136 U/L Final    Total Protein 03/17/2025 6.6  6.4 - 8.2 g/dL Final    AST 03/17/2025 59 (H)  9 - 39 U/L Final    Bilirubin, Total 03/17/2025 0.8  0.0 - 1.2 mg/dL Final    ALT 03/17/2025 53 (H)  7 - 45 U/L Final    Patients treated with Sulfasalazine may generate falsely decreased results for ALT.       Assessment:   Headache/ sore throat/ blisters in the mouth 5 days ago/ bilateral ear pain/ mild transaminitis - LP pending to rule out meningitis and patient continues on broad spectrum coverage until LP can be done. ( Was on augmentin prior to admission )   Possible viral exanthem? Concern for Coxsackie virus A in particular vs enterovirus. In particular, hand foot and mouth disease would be high on the differential since her symptoms actually started a week ago,  resulting in large oral painful blisters, sore throat, ear pain, fever, mild transaminitis and transient headache. Fever peaked on abx and seems to be trending down. Grandchildren are two years old and attend  and she has been caring for them. ( Family seems healthy )   Loose stools    Plan:   She remains on ampicillin ceftriaxone vancomycin. Continue for now and follow up LP results.   De-escalate as soon as possible  Respiratory viral panel ( patient has had her flu vaccine )   Send stool for c diff and stool cultures. Likely diarrhea is antibiotic induced but she has been around children from .     All communication directed to consulting provider.   Thank you very much for this consultation.     Time spent before, during, and after this consult reviewed data and coordinating care on the date of this consultation, including face to face visit with the patient > 60 min

## 2025-03-18 NOTE — NURSING NOTE
The patient is on IR schedule for lumbar puncture.  Procedure request discussed with Dr. Lopez and he requests that 2pm dose of heparin subcutaneous today and hold 10pm dose an tomorrow 6am dose and procedure will be done tomorrow morning.  Secure chat message sent to Dr. Felton and Kelly STORY 8S.

## 2025-03-18 NOTE — CONSULTS
Beth Quinones, Age 69 is being initiated on pharmacy to dose vancomycin for sepsis/CNS/Meningoencephalitis in the ED.  Patient is only ordered 1 dose of vancomycin therapy, as ED orders are not valid to continue when pt is transferred to a floor. Renal function is currently 34.8ml/min.    Patient will be given an initial dose of 2000mg.    Attending or ID Services must reorder if Vancomycin is to be continued.    Please contact the pharmacy at [phone] with any questions.    Reviewed and approved by MAGDA PRADO on 3/17/25 at 11:16 PM.

## 2025-03-18 NOTE — SIGNIFICANT EVENT
Sepsis w/o shock  Acute metabolic encephalopathy  HEDY  UTI   - rectal temp 105 F  - Elevated respiratory rate no evidence of hypoxia, on 2 L nasal cannula.  - Broad-spectrum antibiotics and IV fluids were started in ER  - Patient is alert and oriented x 3      Patient was seen and examined a second time at 11:30 PM.  She is now alert and oriented x 3, no evidence of rigors, treated with 1 g of Tylenol broad-spectrum antibiotics and empiric treatment was started for possible meningitis.  BC x 2 and repeat lactic were obtained, UA was drawn results showing positive UTI.  Initially was going to attempt LP, will defer for now given positive UA findings with evidence of HEDY and likely source of infection.      Will elevate admission status to stepdown unit for further monitoring should patient continue to decompensate will transfer to ICU for LP.

## 2025-03-19 ENCOUNTER — APPOINTMENT (OUTPATIENT)
Dept: RADIOLOGY | Facility: HOSPITAL | Age: 70
DRG: 871 | End: 2025-03-19
Payer: MEDICARE

## 2025-03-19 LAB
ALBUMIN SERPL BCP-MCNC: 2.8 G/DL (ref 3.4–5)
ALP SERPL-CCNC: 217 U/L (ref 33–136)
ALT SERPL W P-5'-P-CCNC: 60 U/L (ref 7–45)
ANION GAP SERPL CALC-SCNC: 14 MMOL/L (ref 10–20)
APPEARANCE CSF: CLEAR
AST SERPL W P-5'-P-CCNC: 77 U/L (ref 9–39)
BACTERIA UR CULT: NORMAL
BASOPHILS # BLD AUTO: 0.14 X10*3/UL (ref 0–0.1)
BASOPHILS NFR BLD AUTO: 0.4 %
BASOPHILS NFR CSF MANUAL: 0 %
BILIRUB SERPL-MCNC: 0.8 MG/DL (ref 0–1.2)
BLASTS CSF MANUAL: 0 %
BUN SERPL-MCNC: 27 MG/DL (ref 6–23)
C COLI+JEJ+UPSA DNA STL QL NAA+PROBE: NOT DETECTED
C DIF TOX TCDA+TCDB STL QL NAA+PROBE: NOT DETECTED
CALCIUM SERPL-MCNC: 8.1 MG/DL (ref 8.6–10.3)
CARDIAC TROPONIN I PNL SERPL HS: 105 NG/L (ref 0–13)
CARDIAC TROPONIN I PNL SERPL HS: 79 NG/L (ref 0–13)
CHLORIDE SERPL-SCNC: 106 MMOL/L (ref 98–107)
CO2 SERPL-SCNC: 20 MMOL/L (ref 21–32)
COLOR CSF: COLORLESS
COLOR SPUN CSF: COLORLESS
CREAT SERPL-MCNC: 1.89 MG/DL (ref 0.5–1.05)
DOHLE BOD BLD QL SMEAR: PRESENT
EC STX1 GENE STL QL NAA+PROBE: NOT DETECTED
EC STX2 GENE STL QL NAA+PROBE: NOT DETECTED
EGFRCR SERPLBLD CKD-EPI 2021: 28 ML/MIN/1.73M*2
EOSINOPHIL # BLD AUTO: 0.17 X10*3/UL (ref 0–0.7)
EOSINOPHIL NFR BLD AUTO: 0.5 %
EOSINOPHIL NFR CSF MANUAL: 0 %
ERYTHROCYTE [DISTWIDTH] IN BLOOD BY AUTOMATED COUNT: 14.9 % (ref 11.5–14.5)
GLUCOSE CSF-MCNC: 55 MG/DL (ref 40–70)
GLUCOSE SERPL-MCNC: 99 MG/DL (ref 74–99)
HCT VFR BLD AUTO: 30.2 % (ref 36–46)
HGB BLD-MCNC: 10.7 G/DL (ref 12–16)
HSV1 DNA CSF QL NAA+PROBE: NOT DETECTED
HSV2 DNA CSF QL NAA+PROBE: NOT DETECTED
IMM GRANULOCYTES # BLD AUTO: 0.36 X10*3/UL (ref 0–0.7)
IMM GRANULOCYTES NFR BLD AUTO: 1.1 % (ref 0–0.9)
IMM GRANULOCYTES NFR CSF: 0 %
LYMPHOCYTES # BLD AUTO: 1.55 X10*3/UL (ref 1.2–4.8)
LYMPHOCYTES NFR BLD AUTO: 4.8 %
LYMPHOCYTES NFR CSF MANUAL: 0 % (ref 28–96)
MAGNESIUM SERPL-MCNC: 1.89 MG/DL (ref 1.6–2.4)
MCH RBC QN AUTO: 31.3 PG (ref 26–34)
MCHC RBC AUTO-ENTMCNC: 35.4 G/DL (ref 32–36)
MCV RBC AUTO: 88 FL (ref 80–100)
MONOCYTES # BLD AUTO: 0.64 X10*3/UL (ref 0.1–1)
MONOCYTES NFR BLD AUTO: 2 %
MONOS+MACROS NFR CSF MANUAL: 100 % (ref 16–56)
NEUTROPHILS # BLD AUTO: 29.2 X10*3/UL (ref 1.2–7.7)
NEUTROPHILS NFR BLD AUTO: 91.2 %
NEUTS SEG NFR CSF MANUAL: 0 % (ref 0–5)
NEUTS VAC BLD QL SMEAR: PRESENT
NOROVIRUS GI + GII RNA STL NAA+PROBE: NOT DETECTED
NRBC BLD-RTO: 0 /100 WBCS (ref 0–0)
OTHER CELLS NFR CSF MANUAL: 0 %
PLASMA CELLS NFR CSF MICRO: 0 %
PLATELET # BLD AUTO: 222 X10*3/UL (ref 150–450)
POTASSIUM SERPL-SCNC: 3.1 MMOL/L (ref 3.5–5.3)
PROT CSF-MCNC: 36 MG/DL (ref 15–45)
PROT SERPL-MCNC: 6.3 G/DL (ref 6.4–8.2)
RBC # BLD AUTO: 3.42 X10*6/UL (ref 4–5.2)
RBC # CSF AUTO: 0 /UL (ref 0–5)
RBC MORPH BLD: NORMAL
RV RNA STL NAA+PROBE: NOT DETECTED
SALMONELLA DNA STL QL NAA+PROBE: NOT DETECTED
SHIGELLA DNA SPEC QL NAA+PROBE: NOT DETECTED
SODIUM SERPL-SCNC: 137 MMOL/L (ref 136–145)
TOTAL CELLS COUNTED CSF: 4
TUBE # CSF: ABNORMAL
V CHOLERAE DNA STL QL NAA+PROBE: NOT DETECTED
VANCOMYCIN SERPL-MCNC: 17.6 UG/ML (ref 5–20)
WBC # BLD AUTO: 32.1 X10*3/UL (ref 4.4–11.3)
WBC # CSF AUTO: <1 /UL (ref 1–5)
Y ENTEROCOL DNA STL QL NAA+PROBE: NOT DETECTED

## 2025-03-19 PROCEDURE — 89051 BODY FLUID CELL COUNT: CPT | Performed by: INTERNAL MEDICINE

## 2025-03-19 PROCEDURE — 2500000004 HC RX 250 GENERAL PHARMACY W/ HCPCS (ALT 636 FOR OP/ED): Performed by: RADIOLOGY

## 2025-03-19 PROCEDURE — 2060000001 HC INTERMEDIATE ICU ROOM DAILY

## 2025-03-19 PROCEDURE — 85025 COMPLETE CBC W/AUTO DIFF WBC: CPT | Performed by: NURSE PRACTITIONER

## 2025-03-19 PROCEDURE — 36415 COLL VENOUS BLD VENIPUNCTURE: CPT | Performed by: INTERNAL MEDICINE

## 2025-03-19 PROCEDURE — 2500000002 HC RX 250 W HCPCS SELF ADMINISTERED DRUGS (ALT 637 FOR MEDICARE OP, ALT 636 FOR OP/ED): Performed by: INTERNAL MEDICINE

## 2025-03-19 PROCEDURE — 80202 ASSAY OF VANCOMYCIN: CPT

## 2025-03-19 PROCEDURE — 84484 ASSAY OF TROPONIN QUANT: CPT | Performed by: INTERNAL MEDICINE

## 2025-03-19 PROCEDURE — 2500000004 HC RX 250 GENERAL PHARMACY W/ HCPCS (ALT 636 FOR OP/ED): Performed by: INTERNAL MEDICINE

## 2025-03-19 PROCEDURE — 2500000001 HC RX 250 WO HCPCS SELF ADMINISTERED DRUGS (ALT 637 FOR MEDICARE OP): Performed by: INTERNAL MEDICINE

## 2025-03-19 PROCEDURE — 2500000004 HC RX 250 GENERAL PHARMACY W/ HCPCS (ALT 636 FOR OP/ED)

## 2025-03-19 PROCEDURE — 7100000001 HC RECOVERY ROOM TIME - INITIAL BASE CHARGE

## 2025-03-19 PROCEDURE — 62328 DX LMBR SPI PNXR W/FLUOR/CT: CPT

## 2025-03-19 PROCEDURE — 84157 ASSAY OF PROTEIN OTHER: CPT | Performed by: INTERNAL MEDICINE

## 2025-03-19 PROCEDURE — 83735 ASSAY OF MAGNESIUM: CPT | Performed by: NURSE PRACTITIONER

## 2025-03-19 PROCEDURE — 87632 RESP VIRUS 6-11 TARGETS: CPT | Performed by: INTERNAL MEDICINE

## 2025-03-19 PROCEDURE — 36415 COLL VENOUS BLD VENIPUNCTURE: CPT | Performed by: NURSE PRACTITIONER

## 2025-03-19 PROCEDURE — 87205 SMEAR GRAM STAIN: CPT | Mod: ELYLAB | Performed by: INTERNAL MEDICINE

## 2025-03-19 PROCEDURE — 7100000002 HC RECOVERY ROOM TIME - EACH INCREMENTAL 1 MINUTE

## 2025-03-19 PROCEDURE — 87493 C DIFF AMPLIFIED PROBE: CPT | Performed by: INTERNAL MEDICINE

## 2025-03-19 PROCEDURE — 2500000004 HC RX 250 GENERAL PHARMACY W/ HCPCS (ALT 636 FOR OP/ED): Performed by: NURSE PRACTITIONER

## 2025-03-19 PROCEDURE — 82945 GLUCOSE OTHER FLUID: CPT | Performed by: INTERNAL MEDICINE

## 2025-03-19 PROCEDURE — 87529 HSV DNA AMP PROBE: CPT | Mod: ELYLAB | Performed by: INTERNAL MEDICINE

## 2025-03-19 PROCEDURE — 99233 SBSQ HOSP IP/OBS HIGH 50: CPT | Performed by: INTERNAL MEDICINE

## 2025-03-19 PROCEDURE — 80053 COMPREHEN METABOLIC PANEL: CPT | Performed by: NURSE PRACTITIONER

## 2025-03-19 PROCEDURE — 2500000001 HC RX 250 WO HCPCS SELF ADMINISTERED DRUGS (ALT 637 FOR MEDICARE OP): Performed by: NURSE PRACTITIONER

## 2025-03-19 PROCEDURE — 87506 IADNA-DNA/RNA PROBE TQ 6-11: CPT | Mod: ELYLAB | Performed by: INTERNAL MEDICINE

## 2025-03-19 RX ORDER — BENZONATATE 100 MG/1
100 CAPSULE ORAL 3 TIMES DAILY PRN
Status: DISCONTINUED | OUTPATIENT
Start: 2025-03-19 | End: 2025-03-26 | Stop reason: HOSPADM

## 2025-03-19 RX ORDER — POTASSIUM CHLORIDE 20 MEQ/1
40 TABLET, EXTENDED RELEASE ORAL ONCE
Status: COMPLETED | OUTPATIENT
Start: 2025-03-19 | End: 2025-03-19

## 2025-03-19 RX ORDER — LIDOCAINE HYDROCHLORIDE 20 MG/ML
2 INJECTION, SOLUTION INFILTRATION; PERINEURAL ONCE
Status: COMPLETED | OUTPATIENT
Start: 2025-03-19 | End: 2025-03-19

## 2025-03-19 RX ORDER — LIDOCAINE HYDROCHLORIDE 20 MG/ML
1.25 SOLUTION OROPHARYNGEAL DAILY PRN
Status: DISCONTINUED | OUTPATIENT
Start: 2025-03-19 | End: 2025-03-26 | Stop reason: HOSPADM

## 2025-03-19 RX ORDER — VANCOMYCIN HYDROCHLORIDE 1 G/200ML
1000 INJECTION, SOLUTION INTRAVENOUS EVERY 24 HOURS
Status: DISCONTINUED | OUTPATIENT
Start: 2025-03-19 | End: 2025-03-20

## 2025-03-19 RX ADMIN — VANCOMYCIN HYDROCHLORIDE 750 MG: 750 INJECTION, POWDER, LYOPHILIZED, FOR SOLUTION INTRAVENOUS at 01:21

## 2025-03-19 RX ADMIN — GUAIFENESIN 600 MG: 600 TABLET, EXTENDED RELEASE ORAL at 22:07

## 2025-03-19 RX ADMIN — POTASSIUM CHLORIDE 40 MEQ: 1500 TABLET, EXTENDED RELEASE ORAL at 09:08

## 2025-03-19 RX ADMIN — CEFTRIAXONE SODIUM 2 G: 2 INJECTION, POWDER, FOR SOLUTION INTRAMUSCULAR; INTRAVENOUS at 13:27

## 2025-03-19 RX ADMIN — ACETAMINOPHEN 650 MG: 325 TABLET ORAL at 16:26

## 2025-03-19 RX ADMIN — BENZOCAINE AND MENTHOL 1 LOZENGE: 15; 3.6 LOZENGE ORAL at 10:28

## 2025-03-19 RX ADMIN — AMPICILLIN SODIUM 2 G: 2 INJECTION, POWDER, FOR SOLUTION INTRAMUSCULAR; INTRAVENOUS at 01:21

## 2025-03-19 RX ADMIN — AMPICILLIN SODIUM 2 G: 2 INJECTION, POWDER, FOR SOLUTION INTRAMUSCULAR; INTRAVENOUS at 09:08

## 2025-03-19 RX ADMIN — HEPARIN SODIUM 7500 UNITS: 5000 INJECTION INTRAVENOUS; SUBCUTANEOUS at 22:06

## 2025-03-19 RX ADMIN — BENZOCAINE AND MENTHOL 1 LOZENGE: 15; 3.6 LOZENGE ORAL at 22:07

## 2025-03-19 RX ADMIN — BENZONATATE 100 MG: 100 CAPSULE ORAL at 16:26

## 2025-03-19 RX ADMIN — HEPARIN SODIUM 7500 UNITS: 5000 INJECTION INTRAVENOUS; SUBCUTANEOUS at 16:26

## 2025-03-19 RX ADMIN — VANCOMYCIN HYDROCHLORIDE 1000 MG: 1 INJECTION, SOLUTION INTRAVENOUS at 22:06

## 2025-03-19 RX ADMIN — LIDOCAINE HYDROCHLORIDE 2 ML: 20 INJECTION, SOLUTION INFILTRATION; PERINEURAL at 12:29

## 2025-03-19 RX ADMIN — PANTOPRAZOLE SODIUM 40 MG: 40 TABLET, DELAYED RELEASE ORAL at 05:42

## 2025-03-19 ASSESSMENT — PAIN SCALES - GENERAL
PAINLEVEL_OUTOF10: 0 - NO PAIN

## 2025-03-19 ASSESSMENT — ACTIVITIES OF DAILY LIVING (ADL): LACK_OF_TRANSPORTATION: NO

## 2025-03-19 ASSESSMENT — COGNITIVE AND FUNCTIONAL STATUS - GENERAL
DAILY ACTIVITIY SCORE: 23
DRESSING REGULAR UPPER BODY CLOTHING: A LITTLE
WALKING IN HOSPITAL ROOM: A LITTLE
MOVING FROM LYING ON BACK TO SITTING ON SIDE OF FLAT BED WITH BEDRAILS: A LITTLE
MOBILITY SCORE: 18
CLIMB 3 TO 5 STEPS WITH RAILING: A LITTLE
TURNING FROM BACK TO SIDE WHILE IN FLAT BAD: A LITTLE
STANDING UP FROM CHAIR USING ARMS: A LITTLE
MOVING TO AND FROM BED TO CHAIR: A LITTLE

## 2025-03-19 ASSESSMENT — PAIN - FUNCTIONAL ASSESSMENT: PAIN_FUNCTIONAL_ASSESSMENT: 0-10

## 2025-03-19 NOTE — PROGRESS NOTES
Medical Group Progress Note  ASSESSMENT & PLAN:     Sepsis with metabolic encephalopathy and elevated LFTs  -source either UTI, given headache consideration for meningitis as well  -for now cont abx to cover meningitis including vanc, rocephin, ampicillin  -ID consulted  -tentative plan for LP with IR tomorrow mornign  -follow cultures      Intractable headache  Hx: migraine  Hx: anxiety  - CTA head neck showing developmental abnormality without evidence of infarction or dissection  - consult Neuro   - Consider ordering MRI of brain in a.m. defer to specialist - low suspicion for acute infarction   - regular diet   - pain control  - telemetry overnight     HEDY  Hypokalemia [2.7]   - Baseline SCr 0.85, on admission SCr 1.61, GFR 34  - Replace and recheck electrolytes     Transaminitis  - No complaints of abdominal pain monitor and trend,      - resume home meds once rec complete and clinically appropriate      VTE Prophylaxis: Heparin subcu    3/19/25  WBC up to 32 today; procal markedly elevated at 92.5  Cont current antibiotics; clinically she looks so much better and encephalopathy is resolved  LP today, follow results, adjust antibiotics if not consistent with bacterial meningitis  Other sources include UTI; she also may have had hand foot mouth disease given oral ulcers and contact with grandchildren who were exposed at   Troponin is elevated, raising suspicion for myocarditis; she has not had any chest discomfort  Check echo; repeat trop  Start asa for now  RUQ us unremarkable; LFTs stable; will check hepatitis panel as well  HEDY slightly better with IVF; will see how her oral intake is today  Replete potassium  Headache is resolved      Glen Felton MD    SUBJECTIVE     Feeling significantly better today. No confusion. No abd pain, n/v/d. No chest pain or dyspnea. No fever. No neck stiffness, photophobia, or headache.     OBJECTIVE:     Last Recorded Vitals:  Vitals:    03/19/25 0700 03/19/25  1130 03/19/25 1140 03/19/25 1220   BP: 132/68 133/65 130/69 138/71   BP Location: Right arm Right arm     Patient Position: Lying Sitting     Pulse: 96  98 98   Resp: 18 20 18 18   Temp: 36.7 °C (98.1 °F) 36.7 °C (98.1 °F)     TempSrc: Temporal Temporal     SpO2: 96% 94% 96% 96%   Weight:       Height:         Last I/O:  I/O last 3 completed shifts:  In: 3436.1 (35.9 mL/kg) [I.V.:1641.3 (17.1 mL/kg); IV Piggyback:1794.8]  Out: - (0 mL/kg)   Weight: 95.7 kg     Physical Exam  GEN: ill appearing, appears stated age  HEENT: NCAT, PERRLA, EOMI, moist mucous membranes  NECK: supple, no masses  CV: RRR, no m/r/g, no LE edema  LUNGS: CTAB, no w/r/c  ABD: soft, NT, ND, NBS  SKIN: no rashes  MSK; no gross deformities, normal joints  NEURO: A+Ox3, no FND  PSYCH: appropriate mood, affect      Inpatient Medications:  ampicillin, 2 g, intravenous, q8h  [Held by provider] aspirin, 81 mg, oral, Daily  [Held by provider] atorvastatin, 40 mg, oral, Daily  cefTRIAXone, 2 g, intravenous, q12h  [Held by provider] heparin (porcine), 7,500 Units, subcutaneous, q8h ALVIN  [Held by provider] metoprolol succinate XL, 50 mg, oral, Daily  pantoprazole, 40 mg, oral, Daily   Or  pantoprazole, 40 mg, intravenous, Daily  vancomycin, 1,000 mg, intravenous, q24h    PRN Medications  PRN medications: acetaminophen **OR** acetaminophen **OR** acetaminophen, acetaminophen, benzocaine-menthol, guaiFENesin, lidocaine, melatonin, ondansetron **OR** ondansetron, vancomycin  Continuous Medications:       LABS AND IMAGING:     Labs:  Results from last 7 days   Lab Units 03/19/25  0602 03/18/25  0546 03/17/25  1227   WBC AUTO x10*3/uL 32.1* 12.0* 13.4*   RBC AUTO x10*6/uL 3.42* 3.29* 3.92*   HEMOGLOBIN g/dL 10.7* 10.3* 11.8*   HEMATOCRIT % 30.2* 29.8* 33.7*   MCV fL 88 91 86   MCH pg 31.3 31.3 30.1   MCHC g/dL 35.4 34.6 35.0   RDW % 14.9* 14.6* 13.9   PLATELETS AUTO x10*3/uL 222 159 255     Results from last 7 days   Lab Units 03/19/25  0602 03/18/25  0546  03/17/25  1631   SODIUM mmol/L 137 132* 133*   POTASSIUM mmol/L 3.1* 2.5* 3.3*   CHLORIDE mmol/L 106 100 100   CO2 mmol/L 20* 21 26   BUN mg/dL 27* 18 17   CREATININE mg/dL 1.89* 1.97* 1.56*   GLUCOSE mg/dL 99 91 116*   PROTEIN TOTAL g/dL 6.3* 5.9* 6.6   CALCIUM mg/dL 8.1* 8.4* 8.6   BILIRUBIN TOTAL mg/dL 0.8 1.3* 0.8   ALK PHOS U/L 217* 220* 213*   AST U/L 77* 95* 59*   ALT U/L 60* 66* 53*     Results from last 7 days   Lab Units 03/19/25  0602 03/18/25  0546 03/17/25  1227   MAGNESIUM mg/dL 1.89 1.39* 2.01     Results from last 7 days   Lab Units 03/19/25  0602   TROPHS ng/L 105*     Imaging:  US right upper quadrant  Narrative: Interpreted By:  To Link,   STUDY:  US RIGHT UPPER QUADRANT;  3/18/2025 2:30 pm      INDICATION:  Signs/Symptoms:elevated LFTs.      COMPARISON:  None.      ACCESSION NUMBER(S):  [id number]      ORDERING CLINICIAN:  SORAIDA STERN      TECHNIQUE:  Multiple images of the right upper quadrant were obtained.      FINDINGS:  LIVER:  The liver measures 17.02 cm in length. The liver parenchyma appears  heterogeneous which can reflect hepatocellular disease, however, no  focal hepatic lesions are identified.      GALLBLADDER:  The gallbladder has been previously removed.      BILE DUCTS:  No evidence of intra or extrahepatic biliary dilatation is  identified; the common bile duct measures 0.79.      PANCREAS:  Obscured by overlying bowel-gas      RIGHT KIDNEY:  The right kidney measures 11.75 cm in length. The renal cortical  echogenicity and thickness are within normal limit.  No  hydronephrosis or renal calculi are seen.      Impression: The liver appears mildly heterogeneous in echotexture which can  reflect hepatocellular disease, however, no focal hepatic lesions are  identified.      MACRO:  None      Signed by: To Link 3/18/2025 3:06 PM  Dictation workstation:   [id number]  ECG 12 lead  Normal sinus rhythm  Left axis deviation  Pulmonary disease pattern  Abnormal  ECG  When compared with ECG of 03-MAR-2015 07:44,  Questionable change in QRS axis  See ED provider note for full interpretation and clinical correlation  Confirmed by Roxy Vuong ([id number]) on 3/18/2025 2:10:54 PM

## 2025-03-19 NOTE — PROGRESS NOTES
03/19/25 1653   Discharge Planning   Living Arrangements Children;Family members   Support Systems Children;Family members   Assistance Needed TBD   Type of Residence Private residence   Who is requesting discharge planning? Provider   Home or Post Acute Services In home services   Expected Discharge Disposition Home H   Does the patient need discharge transport arranged? No   Transportation Needs   In the past 12 months, has lack of transportation kept you from medical appointments or from getting medications? no   In the past 12 months, has lack of transportation kept you from meetings, work, or from getting things needed for daily living? No   Stroke Family Assessment   Stroke Family Assessment Needed No   Intensity of Service   Intensity of Service 0-30 min     Pt resides at home with family , assist with care of grandchildren who also attend day care.  Underwent LP 3/18 r/o meningitis.  Pt currently on ampicillin, ceftriaxone, and  vancomycin. ID  is following .    Care transitions team to continue to follow , anticipate home vs c pending if IV ABX are needed.

## 2025-03-19 NOTE — POST-PROCEDURE NOTE
Interventional Radiology Brief Postprocedure Note    Attending: Torres Hilliard MD    Diagnosis: meningitis rule out    Description of procedure: Lumbar puncture     Anesthesia:  Local    Complications: None    Estimated Blood Loss: none      Approx 18ml of clear CSF drained and provided for diagnostic testing.       See detailed result report with images in PACS.    The patient tolerated the procedure well without incident or complication and is in stable condition.

## 2025-03-19 NOTE — PROGRESS NOTES
Vancomycin Dosing by Pharmacy- FOLLOW UP    Beth Quinones is a 69 y.o. year old female who Pharmacy has been consulted for vancomycin dosing for other UTI . Based on the patient's indication and renal status this patient is being dosed based on a goal AUC of 400-600.     Renal function is currently stable.    Current vancomycin dose: 750 mg given once    Estimated vancomycin AUC on current dose: 360 mg/L.hr     Visit Vitals  /56 (BP Location: Right arm, Patient Position: Lying)   Pulse 92   Temp 35.9 °C (96.6 °F) (Temporal)   Resp 24        Lab Results   Component Value Date    CREATININE 1.89 (H) 2025    CREATININE 1.97 (H) 2025    CREATININE 1.56 (H) 2025    CREATININE 1.61 (H) 2025        Patient weight is as follows:   Vitals:    25 2105   Weight: 95.7 kg (211 lb)       Cultures:  No results found for the encounter in last 14 days.       I/O last 3 completed shifts:  In: 3436.1 (35.9 mL/kg) [I.V.:1641.3 (17.1 mL/kg); IV Piggyback:1794.8]  Out: - (0 mL/kg)   Weight: 95.7 kg   I/O during current shift:  No intake/output data recorded.    Temp (24hrs), Av.6 °C (97.8 °F), Min:35.9 °C (96.6 °F), Max:36.8 °C (98.2 °F)      Assessment/Plan    Below goal AUC. Orders placed for new vancomcyin regimen of 1000 mg every 24 hours to begin at 2300.     This dosing regimen is predicted by [employer] to result in the following pharmacokinetic parameters:  Regimen: 1000 mg IV every 24 hours.  Start time: 01:21 on 2025  Exposure target: AUC24 (range)400-600 mg/L.hr   [id number]: 450 mg/L.hr  AUC24,ss: 459 mg/L.hr  Probability of AUC24 > 400: 95 %  Ctrough,ss: 12.6 mg/L  Probability of Ctrough,ss > 20: 0 %    The next level will be obtained on 3/21 at 0500. May be obtained sooner if clinically indicated.   Will continue to monitor renal function daily while on vancomycin and order serum creatinine at least every 48 hours if not already ordered.  Follow for continued vancomycin needs,  clinical response, and signs/symptoms of toxicity.       Melonie Bernstein, PharmD

## 2025-03-19 NOTE — NURSING NOTE
Rapid Response Nurse Follow Up Note    Patient was evaluated by RRN on 3/18 for fever and tachypnea. Patient is noted to be tachycardic, tachypenic, with elevated BP. RN reports that patients mental status is improving since the previous night. RN reports no concerns at this time.

## 2025-03-19 NOTE — PROGRESS NOTES
Infectious Disease Progress Note       3/19/2025    Patient is a followup regarding headache with sore throat, history of blisters in the mouth, bilateral ear pain, mild transaminitis, fevers which are now trending down, with consideration for meningitis    Marked leukocytosis noted today and procalcitonin is 92.  Despite this, patient actually appears clinically well.    Patient had lumbar puncture, no evidence of meningitis.  Isolation discontinued.  C. difficile test was negative as well.  Diarrhea has somewhat come down.    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  Lab Results   Component Value Date    WBC 32.1 (H) 03/19/2025    HGB 10.7 (L) 03/19/2025    HCT 30.2 (L) 03/19/2025    MCV 88 03/19/2025     03/19/2025     Lab Results   Component Value Date    GLUCOSE 99 03/19/2025    CALCIUM 8.1 (L) 03/19/2025     03/19/2025    K 3.1 (L) 03/19/2025    CO2 20 (L) 03/19/2025     03/19/2025    BUN 27 (H) 03/19/2025    CREATININE 1.89 (H) 03/19/2025       WBC trends are being monitored. Antibiotic doses are being adjusted per most recent renal labs.     Vitals:    03/19/25 1534   BP: 109/65   Pulse: 105   Resp:    Temp: 36.5 °C (97.7 °F)   SpO2: 92%     Obese patient no acute distress  No conversational dyspnea, no tachypnea, no cough  Neck supple  Heart S1-S2 but tachycardic at 102  Lungs bilaterally diminished but clear  Abdomen soft nondistended  Extremities no pain to palpation, no blisters  No oral blisters or evidence of thrush at the present time    Patient Active Problem List   Diagnosis    Abnormal weight gain    Acute non-recurrent maxillary sinusitis    Anxiety disorder    Atypical ductal hyperplasia of breast    Breast neoplasm, Tis (LCIS)    Dysfunction of right eustachian tube    Dyslipidemia    Hypertension    Migraine    Primary osteoarthritis of both knees    Vitamin D deficiency    Weight gain    Essential (primary)  hypertension    Acute bacterial sinusitis    Medicare annual wellness visit, subsequent    Adjustment disorder with anxious mood    Carpal tunnel syndrome, right upper limb    Body mass index (BMI) 40.0-44.9, adult (Multi)    Hyperglycemia    HEDY (acute kidney injury) (CMS-[facility])    Headache           ASSESSMENT:  Sepsis with history of headaches, sore throat, oral lesions, transaminitis, bilateral ear pain with possible exposure to hand-foot-and-mouth disease at grandchildren's       PLAN:  No evidence of meningitis  DC Meningitis isolation  No C diff - dc contact plus precautions  ?HFM disease with complication - rule out cardiac consequences of coxsackie A. She is still tachycardic.  Elevated troponins noted.  Headache is better.  Clinically she looks better  Fevers are also better.   Echo pending.   DC Ampicillin.   Possible discontinuation of vancomycin in the morning on 3/20/2025  Patient is currently empirically on Vanco and Ceftriaxone right now       Imaging and labs were reviewed per medical records and any ID pertinent labs were also addressed  Time spent before, during and after care today, including coordination of care >40 min      Carolann Haji, DO

## 2025-03-19 NOTE — CONSULTS
Vancomycin Dosing by Pharmacy- FOLLOW UP    Beth Quinones is a 69 y.o. year old female who Pharmacy has been consulted for vancomycin dosing for other uti . Based on the patient's indication and renal status this patient is being dosed based on a goal AUC of 400-600.     Renal function is currently stable.    Current vancomycin dose: 750 mg given every 24 hours    Estimated vancomycin AUC on current dose: 438 mg/L.hr     Visit Vitals  /87 (BP Location: Left arm, Patient Position: Lying)   Pulse (!) 127 Comment: RN notified.   Temp 36.7 °C (98.1 °F) (Temporal)   Resp (!) 30 Comment: RN notified.        Lab Results   Component Value Date    CREATININE 1.97 (H) 2025    CREATININE 1.56 (H) 2025    CREATININE 1.61 (H) 2025    CREATININE 0.85 2024        Patient weight is as follows:   Vitals:    25 2105   Weight: 95.7 kg (211 lb)       Cultures:  No results found for the encounter in last 14 days.       I/O last 3 completed shifts:  In: 2054.8 (21.5 mL/kg) [I.V.:260 (2.7 mL/kg); IV Piggyback:1794.8]  Out: - (0 mL/kg)   Weight: 95.7 kg   I/O during current shift:  I/O this shift:  In: 868.8 [I.V.:868.8]  Out: -     Temp (24hrs), Av.4 °C (99.4 °F), Min:36.5 °C (97.7 °F), Max:40.4 °C (104.7 °F)      Assessment/Plan    Within goal AUC range. Continue current vancomycin regimen.    This dosing regimen is predicted by [employer] to result in the following pharmacokinetic parameters:  Regimen: 750 mg IV every 24 hours.  Start time: 00:05 on 2025  Exposure target: AUC24 (range)400-600 mg/L.hr   [id number]: 438 mg/L.hr  AUC24,ss: 415 mg/L.hr  Probability of AUC24 > 400: 62 %  Ctrough,ss: 12.2 mg/L  Probability of Ctrough,ss > 20: 0 %    The next level will be obtained on 3/19/25 at 0500. May be obtained sooner if clinically indicated.   Will continue to monitor renal function daily while on vancomycin and order serum creatinine at least every 48 hours if not already ordered.  Follow for  continued vancomycin needs, clinical response, and signs/symptoms of toxicity.       MAGDA PRADO, PharmD

## 2025-03-20 ENCOUNTER — APPOINTMENT (OUTPATIENT)
Dept: RADIOLOGY | Facility: HOSPITAL | Age: 70
DRG: 871 | End: 2025-03-20
Payer: MEDICARE

## 2025-03-20 ENCOUNTER — APPOINTMENT (OUTPATIENT)
Dept: CARDIOLOGY | Facility: HOSPITAL | Age: 70
DRG: 871 | End: 2025-03-20
Payer: MEDICARE

## 2025-03-20 LAB
ALBUMIN SERPL BCP-MCNC: 2.7 G/DL (ref 3.4–5)
ALP SERPL-CCNC: 249 U/L (ref 33–136)
ALT SERPL W P-5'-P-CCNC: 52 U/L (ref 7–45)
ANION GAP SERPL CALC-SCNC: 13 MMOL/L (ref 10–20)
AORTIC VALVE MEAN GRADIENT: 4 MMHG
AORTIC VALVE PEAK VELOCITY: 1.28 M/S
AST SERPL W P-5'-P-CCNC: 58 U/L (ref 9–39)
AV PEAK GRADIENT: 7 MMHG
AVA (PEAK VEL): 3.06 CM2
AVA (VTI): 3.04 CM2
BASOPHILS # BLD MANUAL: 0 X10*3/UL (ref 0–0.1)
BASOPHILS NFR BLD MANUAL: 0 %
BILIRUB SERPL-MCNC: 0.5 MG/DL (ref 0–1.2)
BUN SERPL-MCNC: 23 MG/DL (ref 6–23)
CALCIUM SERPL-MCNC: 8.2 MG/DL (ref 8.6–10.3)
CHLORIDE SERPL-SCNC: 104 MMOL/L (ref 98–107)
CO2 SERPL-SCNC: 22 MMOL/L (ref 21–32)
CREAT SERPL-MCNC: 1.46 MG/DL (ref 0.5–1.05)
DOHLE BOD BLD QL SMEAR: PRESENT
EGFRCR SERPLBLD CKD-EPI 2021: 39 ML/MIN/1.73M*2
EJECTION FRACTION APICAL 4 CHAMBER: 55.6
EJECTION FRACTION: 53 %
EOSINOPHIL # BLD MANUAL: 0.24 X10*3/UL (ref 0–0.7)
EOSINOPHIL NFR BLD MANUAL: 1 %
ERYTHROCYTE [DISTWIDTH] IN BLOOD BY AUTOMATED COUNT: 15.4 % (ref 11.5–14.5)
GLUCOSE SERPL-MCNC: 115 MG/DL (ref 74–99)
HAV IGM SER QL: NONREACTIVE
HBV CORE IGM SER QL: NONREACTIVE
HBV SURFACE AG SERPL QL IA: NONREACTIVE
HCT VFR BLD AUTO: 29.2 % (ref 36–46)
HCV AB SER QL: NONREACTIVE
HGB BLD-MCNC: 10.2 G/DL (ref 12–16)
HOLD SPECIMEN: NORMAL
IMM GRANULOCYTES # BLD AUTO: 0.32 X10*3/UL (ref 0–0.7)
IMM GRANULOCYTES NFR BLD AUTO: 1.3 % (ref 0–0.9)
LEFT ATRIUM VOLUME AREA LENGTH INDEX BSA: 28.4 ML/M2
LEFT VENTRICLE INTERNAL DIMENSION DIASTOLE: 4.75 CM (ref 3.5–6)
LEFT VENTRICULAR OUTFLOW TRACT DIAMETER: 2.18 CM
LV EJECTION FRACTION BIPLANE: 53 %
LYMPHOCYTES # BLD MANUAL: 0.97 X10*3/UL (ref 1.2–4.8)
LYMPHOCYTES NFR BLD MANUAL: 4 %
MAGNESIUM SERPL-MCNC: 2.04 MG/DL (ref 1.6–2.4)
MCH RBC QN AUTO: 30.8 PG (ref 26–34)
MCHC RBC AUTO-ENTMCNC: 34.9 G/DL (ref 32–36)
MCV RBC AUTO: 88 FL (ref 80–100)
MITRAL VALVE E/A RATIO: 0.66
MONOCYTES # BLD MANUAL: 0 X10*3/UL (ref 0.1–1)
MONOCYTES NFR BLD MANUAL: 0 %
NEUTROPHILS # BLD MANUAL: 22.99 X10*3/UL (ref 1.2–7.7)
NEUTS BAND # BLD MANUAL: 0.97 X10*3/UL (ref 0–0.7)
NEUTS BAND NFR BLD MANUAL: 4 %
NEUTS SEG # BLD MANUAL: 22.02 X10*3/UL (ref 1.2–7)
NEUTS SEG NFR BLD MANUAL: 91 %
NEUTS VAC BLD QL SMEAR: PRESENT
NRBC BLD-RTO: 0 /100 WBCS (ref 0–0)
PLATELET # BLD AUTO: 245 X10*3/UL (ref 150–450)
POTASSIUM SERPL-SCNC: 3.1 MMOL/L (ref 3.5–5.3)
PROT SERPL-MCNC: 6.3 G/DL (ref 6.4–8.2)
RBC # BLD AUTO: 3.31 X10*6/UL (ref 4–5.2)
RBC MORPH BLD: ABNORMAL
RIGHT VENTRICLE FREE WALL PEAK S': 15.4 CM/S
RIGHT VENTRICLE PEAK SYSTOLIC PRESSURE: 21.1 MMHG
SODIUM SERPL-SCNC: 136 MMOL/L (ref 136–145)
TOTAL CELLS COUNTED BLD: 100
TRICUSPID ANNULAR PLANE SYSTOLIC EXCURSION: 1.8 CM
WBC # BLD AUTO: 24.2 X10*3/UL (ref 4.4–11.3)

## 2025-03-20 PROCEDURE — 36415 COLL VENOUS BLD VENIPUNCTURE: CPT | Performed by: INTERNAL MEDICINE

## 2025-03-20 PROCEDURE — 99232 SBSQ HOSP IP/OBS MODERATE 35: CPT | Performed by: INTERNAL MEDICINE

## 2025-03-20 PROCEDURE — 94640 AIRWAY INHALATION TREATMENT: CPT

## 2025-03-20 PROCEDURE — 2500000001 HC RX 250 WO HCPCS SELF ADMINISTERED DRUGS (ALT 637 FOR MEDICARE OP): Performed by: NURSE PRACTITIONER

## 2025-03-20 PROCEDURE — C8929 TTE W OR WO FOL WCON,DOPPLER: HCPCS

## 2025-03-20 PROCEDURE — 97161 PT EVAL LOW COMPLEX 20 MIN: CPT | Mod: GP | Performed by: PHYSICAL THERAPIST

## 2025-03-20 PROCEDURE — 2060000001 HC INTERMEDIATE ICU ROOM DAILY

## 2025-03-20 PROCEDURE — 84075 ASSAY ALKALINE PHOSPHATASE: CPT | Performed by: NURSE PRACTITIONER

## 2025-03-20 PROCEDURE — 36415 COLL VENOUS BLD VENIPUNCTURE: CPT | Performed by: NURSE PRACTITIONER

## 2025-03-20 PROCEDURE — 2500000004 HC RX 250 GENERAL PHARMACY W/ HCPCS (ALT 636 FOR OP/ED): Performed by: INTERNAL MEDICINE

## 2025-03-20 PROCEDURE — 71045 X-RAY EXAM CHEST 1 VIEW: CPT | Performed by: RADIOLOGY

## 2025-03-20 PROCEDURE — 71045 X-RAY EXAM CHEST 1 VIEW: CPT

## 2025-03-20 PROCEDURE — 85027 COMPLETE CBC AUTOMATED: CPT | Performed by: NURSE PRACTITIONER

## 2025-03-20 PROCEDURE — 85007 BL SMEAR W/DIFF WBC COUNT: CPT | Performed by: NURSE PRACTITIONER

## 2025-03-20 PROCEDURE — 93306 TTE W/DOPPLER COMPLETE: CPT | Performed by: INTERNAL MEDICINE

## 2025-03-20 PROCEDURE — 80074 ACUTE HEPATITIS PANEL: CPT | Mod: ELYLAB | Performed by: INTERNAL MEDICINE

## 2025-03-20 PROCEDURE — 2500000002 HC RX 250 W HCPCS SELF ADMINISTERED DRUGS (ALT 637 FOR MEDICARE OP, ALT 636 FOR OP/ED): Performed by: INTERNAL MEDICINE

## 2025-03-20 PROCEDURE — 83735 ASSAY OF MAGNESIUM: CPT | Performed by: NURSE PRACTITIONER

## 2025-03-20 PROCEDURE — 97165 OT EVAL LOW COMPLEX 30 MIN: CPT | Mod: GO

## 2025-03-20 PROCEDURE — 2500000001 HC RX 250 WO HCPCS SELF ADMINISTERED DRUGS (ALT 637 FOR MEDICARE OP): Performed by: INTERNAL MEDICINE

## 2025-03-20 RX ORDER — IPRATROPIUM BROMIDE AND ALBUTEROL SULFATE 2.5; .5 MG/3ML; MG/3ML
3 SOLUTION RESPIRATORY (INHALATION) EVERY 4 HOURS PRN
Status: DISCONTINUED | OUTPATIENT
Start: 2025-03-20 | End: 2025-03-26 | Stop reason: HOSPADM

## 2025-03-20 RX ORDER — POTASSIUM CHLORIDE 20 MEQ/1
40 TABLET, EXTENDED RELEASE ORAL ONCE
Status: COMPLETED | OUTPATIENT
Start: 2025-03-20 | End: 2025-03-20

## 2025-03-20 RX ORDER — ACETAMINOPHEN 500 MG
5 TABLET ORAL DAILY
Status: DISCONTINUED | OUTPATIENT
Start: 2025-03-20 | End: 2025-03-26 | Stop reason: HOSPADM

## 2025-03-20 RX ADMIN — BENZONATATE 100 MG: 100 CAPSULE ORAL at 01:27

## 2025-03-20 RX ADMIN — HEPARIN SODIUM 7500 UNITS: 5000 INJECTION INTRAVENOUS; SUBCUTANEOUS at 22:15

## 2025-03-20 RX ADMIN — CEFTRIAXONE SODIUM 2 G: 2 INJECTION, POWDER, FOR SOLUTION INTRAMUSCULAR; INTRAVENOUS at 01:27

## 2025-03-20 RX ADMIN — BENZOCAINE AND MENTHOL 1 LOZENGE: 15; 3.6 LOZENGE ORAL at 15:44

## 2025-03-20 RX ADMIN — POTASSIUM CHLORIDE 40 MEQ: 1500 TABLET, EXTENDED RELEASE ORAL at 15:36

## 2025-03-20 RX ADMIN — HEPARIN SODIUM 7500 UNITS: 5000 INJECTION INTRAVENOUS; SUBCUTANEOUS at 06:52

## 2025-03-20 RX ADMIN — CEFTRIAXONE SODIUM 2 G: 2 INJECTION, POWDER, FOR SOLUTION INTRAMUSCULAR; INTRAVENOUS at 11:59

## 2025-03-20 RX ADMIN — Medication 5 MG: at 22:15

## 2025-03-20 RX ADMIN — HEPARIN SODIUM 7500 UNITS: 5000 INJECTION INTRAVENOUS; SUBCUTANEOUS at 15:36

## 2025-03-20 RX ADMIN — ASPIRIN 81 MG: 81 TABLET, COATED ORAL at 08:12

## 2025-03-20 RX ADMIN — IPRATROPIUM BROMIDE AND ALBUTEROL SULFATE 3 ML: 2.5; .5 SOLUTION RESPIRATORY (INHALATION) at 22:45

## 2025-03-20 RX ADMIN — PERFLUTREN 2 ML OF DILUTION: 6.52 INJECTION, SUSPENSION INTRAVENOUS at 09:35

## 2025-03-20 RX ADMIN — BENZONATATE 100 MG: 100 CAPSULE ORAL at 15:44

## 2025-03-20 RX ADMIN — CEFTRIAXONE SODIUM 2 G: 2 INJECTION, POWDER, FOR SOLUTION INTRAMUSCULAR; INTRAVENOUS at 22:16

## 2025-03-20 RX ADMIN — BENZOCAINE AND MENTHOL 1 LOZENGE: 15; 3.6 LOZENGE ORAL at 01:27

## 2025-03-20 RX ADMIN — PANTOPRAZOLE SODIUM 40 MG: 40 TABLET, DELAYED RELEASE ORAL at 06:52

## 2025-03-20 RX ADMIN — IPRATROPIUM BROMIDE AND ALBUTEROL SULFATE 3 ML: 2.5; .5 SOLUTION RESPIRATORY (INHALATION) at 17:52

## 2025-03-20 ASSESSMENT — PAIN SCALES - GENERAL
PAINLEVEL_OUTOF10: 0 - NO PAIN

## 2025-03-20 ASSESSMENT — COGNITIVE AND FUNCTIONAL STATUS - GENERAL
DAILY ACTIVITIY SCORE: 24
MOBILITY SCORE: 18
DAILY ACTIVITIY SCORE: 23
CLIMB 3 TO 5 STEPS WITH RAILING: A LITTLE
MOVING FROM LYING ON BACK TO SITTING ON SIDE OF FLAT BED WITH BEDRAILS: A LITTLE
WALKING IN HOSPITAL ROOM: A LITTLE
STANDING UP FROM CHAIR USING ARMS: A LITTLE
MOBILITY SCORE: 24
TURNING FROM BACK TO SIDE WHILE IN FLAT BAD: A LITTLE
MOVING TO AND FROM BED TO CHAIR: A LITTLE
DRESSING REGULAR UPPER BODY CLOTHING: A LITTLE

## 2025-03-20 ASSESSMENT — PAIN - FUNCTIONAL ASSESSMENT
PAIN_FUNCTIONAL_ASSESSMENT: 0-10

## 2025-03-20 ASSESSMENT — ACTIVITIES OF DAILY LIVING (ADL): BATHING_ASSISTANCE: INDEPENDENT

## 2025-03-20 NOTE — PROGRESS NOTES
Pulmonary Critical Care Progress Note  Titus Rajan MD     Patient seen for the follow up of acute hypoxic respiratory failure, asthma extubation, Pneumonia due to COVID-19 virus     Subjective:  No significant overnight events noted. She wore BiPAP last night with FiO2 of 75%  She is resting in bed. She remains on high flow, 55 L /65 % FiO2. No significant change in her shortness of breath. She has productive cough with yellow sputum. She is feeling stronger today. Examination:  Vitals: /83   Pulse 90   Temp 98.4 °F (36.9 °C) (Oral)   Resp 23   Ht 5' 2\" (1.575 m)   Wt 230 lb 9.6 oz (104.6 kg)   SpO2 96%   BMI 42.18 kg/m²   General appearance:  alert and cooperative with exam, resting in bed  Neck: No JVD  Lungs: Bilateral crackles, no wheezing, moderate air exchange  Heart: regular rate and rhythm, S1, S2 normal, no gallop  Abdomen: Soft, non tender, + BS  Extremities: no cyanosis or clubbing. No significant edema    LABs:  CBC:   Recent Labs     11/17/21  0501 11/18/21  0620   WBC 13.6* 12.9*   HGB 15.8* 15.7*   HCT 48.1* 48.3*    278     BMP:   Recent Labs     11/17/21  0501 11/17/21  0501 11/17/21  1825 11/18/21  0620   *  --   --  134*   K 5.4*   < > 4.3 5.0   CO2 20  --   --  22   BUN 39*  --   --  42*   CREATININE 0.54  --   --  0.63   LABGLOM >60  --   --  >60   GLUCOSE 154*  --   --  165*    < > = values in this interval not displayed.      Radiology:  X-ray chest: 11/16/2021  Bilateral pulmonary infiltrate, slightly worsened on the right since previous exam      Impression:  · Acute hypoxic respiratory failure  · COVID-19 pneumonia with possible bacterial coinfection  · Mild adenopathy, likely reactive  · History of asthma  · Suspected obstructive sleep apnea/Obesity  · Arthritis, hypertension, fibromyalgia    Recommendations:  · Continue airborne isolation  · Prone as tolerated  · Incentive spirometry every hour while awake  · Continue BiPAP support  · Oxygen via high flow Vancomycin Dosing by Pharmacy- Cessation of Therapy    Consult to pharmacy for vancomycin dosing has been discontinued by the prescriber, pharmacy will sign off at this time.    Please call pharmacy if there are further questions or re-enter a consult if vancomycin is resumed.     Any Delong, [facility]    nasal cannula as tolerated, keep SPO2 90% or greater   · Continue Decadron to IV 10 mg every 12 hours  · S/p Actemra  · Continue albuterol and Ipratropium Q 4 hours and prn  · Brovana aerosol treatment  · Budesonide aerosol treatment  · Continue Levaquin 750 mg daily  · X-ray chest in the morning  · Labs: CBC and BMP in am  · DVT prophylaxis with low molecular weight heparin  · LTAC evaluation  · Discussed with RN and RT  · Will follow with you    Muriel Prescott MD, [facility]  Pulmonary Critical Care and Sleep Medicine,  [facility]  Cell: [phone]  Office: [phone]

## 2025-03-20 NOTE — PROGRESS NOTES
Occupational Therapy    Evaluation    Patient Name: Beth Quinones  MRN: [id number]  Department: [facility]  Room: [address] [place] [address]  Today's Date: 3/20/2025  Time Calculation  Start Time: 1451  Stop Time: 1507  Time Calculation (min): 16 min        Assessment:  OT Assessment: Independent. No acute care needs  Prognosis: Good  Barriers to Discharge Home: No anticipated barriers  Evaluation/Treatment Tolerance: Patient tolerated treatment well  End of Session Communication: Bedside nurse  End of Session Patient Position: Bed, 3 rail up, Alarm off, not on at start of session (Call light within reach)  Prognosis: Good  Evaluation/Treatment Tolerance: Patient tolerated treatment well  Plan:  OT Frequency: OT eval only  OT Discharge Recommendations: No further acute OT     Subjective   Current Problem:  1. HEDY (acute kidney injury) (CMS-HCC)  Transthoracic Echo (TTE) Complete    Transthoracic Echo (TTE) Complete      2. Hypokalemia        3. Tachypnea, not elsewhere classified  Transthoracic Echo (TTE) Complete    Transthoracic Echo (TTE) Complete      4. Myocarditis, unspecified (Multi)  Transthoracic Echo (TTE) Complete        General:  General  Reason for Referral: ADL impairment  Referred By: Purnima OT/PT 3/20  Past Medical History Relevant to Rehab: HLD, HTN, Migraines, Arthritis, anxiety  Family/Caregiver Present: No  Co-Treatment: PT  Co-Treatment Reason: to maximize pt. safety  Prior to Session Communication: Bedside nurse  Patient Position Received: Bed, 2 rail up, Alarm off, not on at start of session  General Comment: Pt. is a 70 y/o who presented to [facility] ED twice on 3/17/2025. 1st visit Pt. presented with c/o headache x 5 days. Headache resolved in ED. Pt. D/C home. Pt. returned a few hours later not feeling well, c/o weakness. In ED, K = 2.7, rectal temp 105°F. Head CT (3/17) (-) acute findings Head CTA (3/17) (-) acute findings CXR (3/17) (+) infectious airways disease RUQ US (3/18) (+) hepatocellular disease Lumbar  puncture (3/19) Clear CXR (3/20) (-) acute findings Per I&D, Pt. has hand foot, mouth disease Dx: Sepsis, confusion, HEDY, hypokalemia, elevated LFTs  Precautions:  Medical Precautions: Infection precautions (suspected hand foot and mouth)    Pain:  Pain Assessment  Pain Assessment: 0-10  0-10 (Numeric) Pain Score: 0 - No pain    Objective   Cognition:  Overall Cognitive Status: Within Functional Limits  Orientation Level: Oriented X4    Home Living:  Home Living Comments: Pt lives with grandson and his family (2 young great grandchildren). Two story house, one step to enter. Sleeps on main  floor with 1/2 bath. Full bath on 2nd floor. Has tub shower with grab bars.  Prior Function:  Prior Function Comments: Independent with ADLs, IADLs. No falls. Drives. Works. Denies use of AD and does not own AD    ADL:  Eating Assistance: Independent  Grooming Assistance: Independent  Bathing Assistance: Independent  UE Dressing Assistance: Independent  LE Dressing Assistance: Independent  Toileting Assistance with Device: Independent  Activity Tolerance:  Endurance: Endurance does not limit participation in activity  Activity Tolerance Comments: WNL  Bed Mobility/Transfers: Bed Mobility  Bed Mobility: Yes  Bed Mobility 1  Bed Mobility 1: Supine to sitting  Level of Assistance 1: Modified independent  Bed Mobility 2  Bed Mobility  2: Sitting to supine  Level of Assistance 2: Modified independent    Transfers  Transfer: Yes  Transfer 1  Technique 1: Sit to stand  Transfer Level of Assistance 1: Independent  Trials/Comments 1: No AD    Functional Mobility:  Functional Mobility  Functional Mobility Performed:  (No AD use; Independent for distances in room. No LOB)    Standing Balance:  Dynamic Standing Balance  Dynamic Standing-Comments: Fair +/good dyn standing     Strength:  Strength Comments: 5/5 MMT Deepthi    Coordination:  Movements are Fluid and Coordinated: Yes  Coordination Comment: WNL   Hand Function:  Gross Grasp:  Functional  Extremities: RUE   RUE : Within Functional Limits and LUE   LUE: Within Functional Limits    Outcome Measures:[facility] Daily Activity  Putting on and taking off regular lower body clothing: None  Bathing (including washing, rinsing, drying): None  Putting on and taking off regular upper body clothing: None  Toileting, which includes using toilet, bedpan or urinal: None  Taking care of personal grooming such as brushing teeth: None  Eating Meals: None  Daily Activity - Total Score: 24    Education Documentation  ADL Training, taught by Malgorzata Ledbetter OT at 3/20/2025  3:38 PM.  Learner: Patient  Readiness: Acceptance  Method: Explanation  Response: Verbalizes Understanding    IP EDUCATION:  Education  Individual(s) Educated: Patient  Education Provided: Fall precautons, Risk and benefits of OT discussed with patient or other, POC discussed and agreed upon

## 2025-03-20 NOTE — PROGRESS NOTES
3/20 Patient will need follow up for discharge planning clarified. At time of TCC round was awaiting PT>OT eval.

## 2025-03-20 NOTE — PROGRESS NOTES
Medical Group Progress Note  ASSESSMENT & PLAN:     Sepsis with metabolic encephalopathy and elevated LFTs  -source either UTI, given headache consideration for meningitis as well  -for now cont abx to cover meningitis including vanc, rocephin, ampicillin  -ID consulted  -tentative plan for LP with IR tomorrow mornign  -follow cultures      Intractable headache  Hx: migraine  Hx: anxiety  - CTA head neck showing developmental abnormality without evidence of infarction or dissection  - consult Neuro   - Consider ordering MRI of brain in a.m. defer to specialist - low suspicion for acute infarction   - regular diet   - pain control  - telemetry overnight     HEDY  Hypokalemia [2.7]   - Baseline SCr 0.85, on admission SCr 1.61, GFR 34  - Replace and recheck electrolytes     Transaminitis  - No complaints of abdominal pain monitor and trend,      - resume home meds once rec complete and clinically appropriate      VTE Prophylaxis: Heparin subcu    3/20/25  WBC downtrending to 24 today  Cont current antibiotics; clinically she looks so much better and encephalopathy is resolved  LP not consistent with meningitis, ampicillin discontinued  Other sources include UTI; she also may have had hand foot mouth disease given oral ulcers and contact with grandchildren who were exposed at   Trop downtrending; echo shows low normal EF, no valvular disease; trivial effusion; trop elevation may be 2/2 demand in setting of sepsis and tachycardia  Cont asa for now  Cr improving off of IVF, cont oral intake for now  Replete potassium        Glen Felton MD    SUBJECTIVE     No overnight events. Stool is more formed. No fever. No chest pain. Having increased dry cough, no sputum.     OBJECTIVE:     Last Recorded Vitals:  Vitals:    03/20/25 0325 03/20/25 0700 03/20/25 1008 03/20/25 1156   BP: 129/64 117/55 123/63 128/69   BP Location:  Right arm Right arm Right arm   Patient Position:  Lying Lying Lying   Pulse: 100   93    Resp:  18 18    Temp: 37.3 °C (99.1 °F) 36.8 °C (98.3 °F) 36.8 °C (98.3 °F) 36.5 °C (97.7 °F)   TempSrc:  Oral Oral Temporal   SpO2: 93% 91% 94% 92%   Weight:       Height:         Last I/O:  I/O last 3 completed shifts:  In: 1812.5 (18.9 mL/kg) [I.V.:1762.5 (18.4 mL/kg); IV Piggyback:50]  Out: - (0 mL/kg)   Weight: 95.7 kg     Physical Exam  GEN: ill appearing, appears stated age  HEENT: NCAT, PERRLA, EOMI, moist mucous membranes  NECK: supple, no masses  CV: RRR, no m/r/g, no LE edema  LUNGS: CTAB, no w/r/c  ABD: soft, NT, ND, NBS  SKIN: no rashes  MSK; no gross deformities, normal joints  NEURO: A+Ox3, no FND  PSYCH: appropriate mood, affect      Inpatient Medications:  aspirin, 81 mg, oral, Daily  [Held by provider] atorvastatin, 40 mg, oral, Daily  cefTRIAXone, 2 g, intravenous, q12h  heparin (porcine), 7,500 Units, subcutaneous, q8h ALVIN  melatonin, 5 mg, oral, Daily  [Held by provider] metoprolol succinate XL, 50 mg, oral, Daily  pantoprazole, 40 mg, oral, Daily   Or  pantoprazole, 40 mg, intravenous, Daily    PRN Medications  PRN medications: acetaminophen **OR** acetaminophen **OR** acetaminophen, acetaminophen, benzocaine-menthol, benzonatate, guaiFENesin, lidocaine, ondansetron **OR** ondansetron  Continuous Medications:       LABS AND IMAGING:     Labs:  Results from last 7 days   Lab Units 03/20/25  0553 03/19/25  0602 03/18/25  0546   WBC AUTO x10*3/uL 24.2* 32.1* 12.0*   RBC AUTO x10*6/uL 3.31* 3.42* 3.29*   HEMOGLOBIN g/dL 10.2* 10.7* 10.3*   HEMATOCRIT % 29.2* 30.2* 29.8*   MCV fL 88 88 91   MCH pg 30.8 31.3 31.3   MCHC g/dL 34.9 35.4 34.6   RDW % 15.4* 14.9* 14.6*   PLATELETS AUTO x10*3/uL 245 222 159     Results from last 7 days   Lab Units 03/20/25  0553 03/19/25  0602 03/18/25  0546   SODIUM mmol/L 136 137 132*   POTASSIUM mmol/L 3.1* 3.1* 2.5*   CHLORIDE mmol/L 104 106 100   CO2 mmol/L 22 20* 21   BUN mg/dL 23 27* 18   CREATININE mg/dL 1.46* 1.89* 1.97*   GLUCOSE mg/dL 115* 99 91   PROTEIN  TOTAL g/dL 6.3* 6.3* 5.9*   CALCIUM mg/dL 8.2* 8.1* 8.4*   BILIRUBIN TOTAL mg/dL 0.5 0.8 1.3*   ALK PHOS U/L 249* 217* 220*   AST U/L 58* 77* 95*   ALT U/L 52* 60* 66*     Results from last 7 days   Lab Units 03/20/25  0553 03/19/25  0602 03/18/25  0546   MAGNESIUM mg/dL 2.04 1.89 1.39*     Results from last 7 days   Lab Units 03/19/25  1441 03/19/25  0602   TROPHS ng/L 79* 105*     Imaging:  XR chest 1 view  Narrative: Interpreted By:  César Vargas,   STUDY:  XR CHEST 1 VIEW;  3/20/2025 11:06 am      INDICATION:  Signs/Symptoms:Increasing cough, hypoxia in setting of sepsis with  unclear source.          COMPARISON:  Portable chest, 17 March 2025      ACCESSION NUMBER(S):  [id number]      ORDERING CLINICIAN:  SORAIDA STERN      TECHNIQUE:  Single frontal view of the chest; Portable technique      FINDINGS:      The cardiomediastinal silhouette is unchanged      Low lung volumes, without infiltrate, edema, effusion or pneumothorax      Impression: NO INTERVAL CHANGE OR ACUTE DISEASE IN THE CHEST      MACRO:  None      Signed by: César Vargas 3/20/2025 12:14 PM  Dictation workstation:   [id number]  Transthoracic Echo (TTE) [place] [id number]   Tel [phone] Fax [phone]    TRANSTHORACIC ECHOCARDIOGRAM REPORT    Patient Name:       JENNIFER KOFI SANON      Reading Physician:    [id number] Aj Patino MD, [facility]  Study Date:         3/20/2025           Ordering Provider:    [id number] SORAIDA STERN  MRN/PID:            [id number]            Fellow:  Accession#:         [id number]        Nurse:  Date of Birth/Age:  1955 / 69 years Sonographer:          Patricia Betts RDCS  Gender Assigned at  F                   Additional Staff:  Birth:  Height:             152.40  cm           Admit Date:           3/17/2025  Weight:             95.71 kg            Admission Status:     Inpatient -                                                                Routine  BSA / BMI:          1.91 m2 / 41.21     Department Location:  [place] [address]                                     Echo Lab  Blood Pressure: 129 /64 mmHg    Study Type:    TRANSTHORACIC ECHO (TTE) COMPLETE  Diagnosis/ICD: Myocarditis, unspecified-I51.4  Indication:    Evaluate for Myocarditis  CPT Codes:     Echo Complete w Full [id number]    Patient History:  Pertinent History: HTN and Hyperlipidemia.    Study Detail: The following Echo studies were performed: 2D, M-Mode, Doppler and                color flow. Definity used as a contrast agent for endocardial                border definition. Total contrast used for this procedure was 2 mL                via IV push.       PHYSICIAN INTERPRETATION:  Left Ventricle: The left ventricular systolic function is low normal, with a visually estimated ejection fraction of 50-55%. There is mild concentric left ventricular hypertrophy. There are no regional wall motion abnormalities. The left ventricular cavity size is normal. There is mild increased septal and mildly increased posterior left ventricular wall thickness. Spectral Doppler shows a Grade I (impaired relaxation pattern) of left ventricular diastolic filling with normal left atrial filling pressure.  Left Atrium: The left atrial size is normal. Left atrial volume index 28.4 mL/m2.  Right Ventricle: The right ventricle is normal in size. There is normal right ventricular global systolic function.  Right Atrium: The right atrial size is normal.  Aortic Valve: The aortic valve is trileaflet. The aortic valve dimensionless index is 0.82. There is no evidence of aortic valve regurgitation. The peak instantaneous gradient of the aortic valve is 7 mmHg. The mean gradient of the aortic valve is 4  mmHg.  Mitral Valve: The mitral valve is normal in structure. There is no evidence of mitral valve regurgitation.  Tricuspid Valve: The tricuspid valve is structurally normal. No evidence of tricuspid regurgitation. Trivial tricuspid regurgitation with estimated RVSP 21 mmHg.  Pulmonic Valve: The pulmonic valve is structurally normal. There is no indication of pulmonic valve regurgitation.  Pericardium: Trivial pericardial effusion. The effusion is circumferential.  Aorta: The aortic root is normal.  Systemic Veins: The inferior vena cava appears normal in size, with IVC inspiratory collapse greater than 50%.  In comparison to the previous echocardiogram(s): No previous available for comparison.       CONCLUSIONS:   1. The left ventricular systolic function is low normal, with a visually estimated ejection fraction of 50-55%.   2. No regional wall motion abnormalities.   3. Spectral Doppler shows a Grade I (impaired relaxation pattern) of left ventricular diastolic filling with normal left atrial filling pressure.   4. Trivial tricuspid regurgitation with estimated RVSP 21 mmHg.   5. No previous available for comparison.    QUANTITATIVE DATA SUMMARY:     2D MEASUREMENTS:             Normal Ranges:  Ao Root d:       3.31 cm     (2.0-3.7cm)  LAs:             4.30 cm     (2.7-4.0cm)  IVSd:            1.09 cm     (0.6-1.1cm)  LVPWd:           1.05 cm     (0.6-1.1cm)  LVIDd:           4.75 cm     (3.9-5.9cm)  LVIDs:           3.25 cm  LV Mass Index:   96.1 g/m2  LVEDV Index:     63.77 ml/m2  LV % FS          31.6 %       LEFT ATRIUM:                  Normal Ranges:  LA Vol A4C:        60.9 ml    (22+/-6mL/m2)  LA Vol A2C:        48.1 ml  LA Vol BP:         54.2 ml  LA Vol Index A4C:  31.9ml/m2  LA Vol Index A2C:  25.2 ml/m2  LA Vol Index BP:   28.4 ml/m2  LA Area A4C:       21.0 cm2  LA Area A2C:       18.6 cm2  LA Major Axis A4C: 6.2 cm  LA Major Axis A2C: 6.1 cm  LA Volume Index:   27.3 ml/m2       RIGHT ATRIUM:                  Normal Ranges:  RA Vol A4C:        40.5 ml    (8.3-19.5ml)  RA Vol Index A4C:  21.2 ml/m2  RA Area A4C:       16.9 cm2  RA Major Axis A4C: 6.0 cm       AORTA MEASUREMENTS:         Normal Ranges:  Asc Ao, d:          3.22 cm (2.1-3.4cm)       LV SYSTOLIC FUNCTION:                       Normal Ranges:  EF-A4C View:    56 % (>=55%)  EF-A2C View:    55 %  EF-Biplane:     53 %  EF-Visual:      53 %  LV EF Reported: 53 %       LV DIASTOLIC FUNCTION:           Normal Ranges:  MV Peak E:             0.76 m/s  (0.7-1.2 m/s)  MV Peak A:             1.15 m/s  (0.42-0.7 m/s)  E/A Ratio:             0.66      (1.0-2.2)  MV e'                  0.085 m/s (>8.0)  MV lateral e'          0.10 m/s  MV medial e'           0.07 m/s  E/e' Ratio:            8.96      (<8.0)       MITRAL VALVE:          Normal Ranges:  MV DT:        170 msec (150-240msec)       AORTIC VALVE:                     Normal Ranges:  AoV Vmax:                1.28 m/s (<=1.7m/s)  AoV Peak P.6 mmHg (<20mmHg)  AoV Mean P.0 mmHg (1.7-11.5mmHg)  LVOT Max Dinh:            1.05 m/s (<=1.1m/s)  AoV VTI:                 24.90 cm (18-25cm)  LVOT VTI:                20.30 cm  LVOT Diameter:           2.18 cm  (1.8-2.4cm)  AoV Area, VTI:           3.04 cm2 (2.5-5.5cm2)  AoV Area,Vmax:           3.06 cm2 (2.5-4.5cm2)  AoV Dimensionless Index: 0.82       RIGHT VENTRICLE:  RV Basal 3.39 cm  RV Mid   2.69 cm  RV Major 7.4 cm  TAPSE:   17.8 mm  RV s'    0.15 m/s       TRICUSPID VALVE/RVSP:          Normal Ranges:  Peak TR Velocity:     2.13 m/s  RV Syst Pressure:     21 mmHg  (< 30mmHg)  IVC Diam:             1.58 cm       PULMONIC VALVE:          Normal Ranges:  PV Accel Time:  92 msec  (>120ms)  PV Max Dinh:     1.2 m/s  (0.6-0.9m/s)  PV Max P.4 mmHg       [id number] Aj Patino MD, FACC  Electronically signed on 3/20/2025 at 10:42:58 AM       ** Final **

## 2025-03-20 NOTE — PROGRESS NOTES
Physical Therapy    Physical Therapy Evaluation    Patient Name: Beth Quinones  MRN: [id number]  Today's Date: 3/20/2025   Time Calculation  Start Time: 1452  Stop Time: 1505  Time Calculation (min): 13 min  811/811-A    Assessment/Plan   PT Assessment  Rehab Prognosis: Good  Evaluation/Treatment Tolerance: Patient tolerated treatment well  Medical Staff Made Aware: Yes  Strengths: Ability to acquire knowledge, Attitude of self, Coping skills, Premorbid level of function, Support of Caregivers  End of Session Communication: Bedside nurse  Assessment Comment: Pt. is I with transfers and amb  End of Session Patient Position: Bed, 3 rail up, Alarm off, not on at start of session (Call light within reach)  IP OR SWING BED PT PLAN  Inpatient or Swing Bed: Inpatient  PT Plan  PT Plan: PT Eval only  PT Eval Only Reason: No acute PT needs identified  PT Frequency: PT eval only  PT Discharge Recommendations: No further acute PT  PT Recommended Transfer Status: Independent  Physical Therapy eval completed per MD requisition. P.T. recommendations as outlined above. Recommend D/C from acute care when medically appropriate as deemed by medical staff.        General Visit Information:  General  Reason for Referral: impaired mobility  Referred By: Dr. Santacruz (PT/OT 3/20)  Past Medical History Relevant to Rehab: includes: HTN, HLD, migraines, OA, anxiety  Family/Caregiver Present: No  Co-Treatment: OT  Co-Treatment Reason: Pt. seen with OT to maximize safety and function  Prior to Session Communication: Bedside nurse  Patient Position Received: Bed, 3 rail up, Alarm off, not on at start of session  Preferred Learning Style: auditory, verbal  General Comment: Pt. is a 70yo who presented to [facility] ED twice on 3/17/2025. 1st visit Pt. presented with c/o headache x 5 days.  Headache resolved in ED. Pt. D/C home. Pt. returned a few hours later not feeling well, c/o weakness. In ED, K = 2.7, rectal temp 105°F.    Head CT (3/17) (-) acute  findings   Head CTA (3/17) (-) acute findings   CXR (3/17) (+) infectious airways disease    RUQ US (3/18) (+) hepatocellular disease    Lumbar puncture (3/19) Clear    CXR (3/20) (-) acute findings   Per I&D, Pt. has hand foot, mouth disease   Dx: Sepsis, confusion, HEDY, hypokalemia, elevated LFTs    Home Living:  Home Living  Home Living Comments: Pt lives with grandson and his family (2 young great grandchildren). Two story house, one step to enter. Sleeps on main  floor with 1/2 bath. Full bath on 2nd floor. Has tub shower with grab bars.    Prior Level of Function:  Prior Function Per Pt/Caregiver Report  Prior Function Comments: Independent with ADLs, IADLs. No falls. Drives. Works. Denies use of AD and does not own AD    Precautions:  Precautions  Medical Precautions: Infection precautions ((+) Hand, foot, mouth disease)  Precautions Comment: Per EMR: High fall risk         Objective     Pain:  Pain Assessment  Pain Assessment: 0-10  0-10 (Numeric) Pain Score: 0 - No pain    Cognition:  Cognition  Overall Cognitive Status: Within Functional Limits  Orientation Level: Oriented X4    General Assessments:  General Observation  General Observation: Tele                     Dynamic Sitting Balance  Dynamic Sitting-Comments: Good static and dynamic sitting balance  Dynamic Standing Balance  Dynamic Standing-Comments: Good static and dynamic standing balance    Functional Assessments:     Bed Mobility  Bed Mobility: Yes  Bed Mobility 1  Bed Mobility 1: Supine to sitting, Sitting to supine  Level of Assistance 1: Independent  Transfers  Transfer: Yes  Transfer 1  Technique 1: Sit to stand, Stand to sit  Transfer Device 1:  (No AD)  Transfer Level of Assistance 1: Independent  Ambulation/Gait Training  Ambulation/Gait Training Performed: Yes  Ambulation/Gait Training 1  Surface 1: Level tile  Device 1: No device  Assistance 1: Independent  Quality of Gait 1:  (slow, steady, reciprocal gait. No LOB)  Comments/Distance  (ft) 1: 30'  Stairs  Stairs: No       Extremity/Trunk Assessments:  RUE   RUE : Within Functional Limits  LUE   LUE: Within Functional Limits  RLE   RLE : Within Functional Limits  LLE   LLE : Within Functional Limits    Outcome Measures:     [facility] Basic Mobility  Turning from your back to your side while in a flat bed without using bedrails: None  Moving from lying on your back to sitting on the side of a flat bed without using bedrails: None  Moving to and from bed to chair (including a wheelchair): None  Standing up from a chair using your arms (e.g. wheelchair or bedside chair): None  To walk in hospital room: None  Climbing 3-5 steps with railing: None  Basic Mobility - Total Score: 24                                                             Education Documentation  Mobility Training, taught by Alexander Montes PT at 3/20/2025  3:20 PM.  Learner: Patient  Readiness: Acceptance  Method: Explanation  Response: Verbalizes Understanding  Comment: Role of PT

## 2025-03-21 ENCOUNTER — DOCUMENTATION (OUTPATIENT)
Dept: CARDIOVASCULAR ICU | Facility: HOSPITAL | Age: 70
End: 2025-03-21
Payer: MEDICARE

## 2025-03-21 LAB
ADENOVIRUS RVP, VIRC: NOT DETECTED
ALBUMIN SERPL BCP-MCNC: 2.8 G/DL (ref 3.4–5)
ALP SERPL-CCNC: 243 U/L (ref 33–136)
ALT SERPL W P-5'-P-CCNC: 53 U/L (ref 7–45)
ANION GAP SERPL CALC-SCNC: 13 MMOL/L (ref 10–20)
AST SERPL W P-5'-P-CCNC: 56 U/L (ref 9–39)
BASOPHILS # BLD AUTO: 0.08 X10*3/UL (ref 0–0.1)
BASOPHILS NFR BLD AUTO: 0.5 %
BILIRUB SERPL-MCNC: 0.5 MG/DL (ref 0–1.2)
BUN SERPL-MCNC: 19 MG/DL (ref 6–23)
CALCIUM SERPL-MCNC: 8.3 MG/DL (ref 8.6–10.3)
CHLORIDE SERPL-SCNC: 107 MMOL/L (ref 98–107)
CO2 SERPL-SCNC: 21 MMOL/L (ref 21–32)
CREAT SERPL-MCNC: 1.21 MG/DL (ref 0.5–1.05)
EGFRCR SERPLBLD CKD-EPI 2021: 48 ML/MIN/1.73M*2
ENTEROVIRUS/RHINOVIRUS RVP, VIRC: NOT DETECTED
EOSINOPHIL # BLD AUTO: 0.44 X10*3/UL (ref 0–0.7)
EOSINOPHIL NFR BLD AUTO: 2.7 %
ERYTHROCYTE [DISTWIDTH] IN BLOOD BY AUTOMATED COUNT: 15.6 % (ref 11.5–14.5)
GLUCOSE SERPL-MCNC: 110 MG/DL (ref 74–99)
HCT VFR BLD AUTO: 29.9 % (ref 36–46)
HGB BLD-MCNC: 10.3 G/DL (ref 12–16)
HUMAN BOCAVIRUS RVP, VIRC: NOT DETECTED
HUMAN CORONAVIRUS RVP, VIRC: NOT DETECTED
IMM GRANULOCYTES # BLD AUTO: 0.31 X10*3/UL (ref 0–0.7)
IMM GRANULOCYTES NFR BLD AUTO: 1.9 % (ref 0–0.9)
INFLUENZA A , VIRC: NOT DETECTED
INFLUENZA A H1N1-09 , VIRC: NOT DETECTED
INFLUENZA B PCR, VIRC: NOT DETECTED
LYMPHOCYTES # BLD AUTO: 1.94 X10*3/UL (ref 1.2–4.8)
LYMPHOCYTES NFR BLD AUTO: 12.1 %
MCH RBC QN AUTO: 30.4 PG (ref 26–34)
MCHC RBC AUTO-ENTMCNC: 34.4 G/DL (ref 32–36)
MCV RBC AUTO: 88 FL (ref 80–100)
METAPNEUMOVIRUS , VIRC: NOT DETECTED
MONOCYTES # BLD AUTO: 0.63 X10*3/UL (ref 0.1–1)
MONOCYTES NFR BLD AUTO: 3.9 %
NEUTROPHILS # BLD AUTO: 12.68 X10*3/UL (ref 1.2–7.7)
NEUTROPHILS NFR BLD AUTO: 78.9 %
NRBC BLD-RTO: 0 /100 WBCS (ref 0–0)
PARAINFLUENZA PCR, VIRC: NOT DETECTED
PLATELET # BLD AUTO: 288 X10*3/UL (ref 150–450)
POTASSIUM SERPL-SCNC: 3.9 MMOL/L (ref 3.5–5.3)
PROT SERPL-MCNC: 6.5 G/DL (ref 6.4–8.2)
RBC # BLD AUTO: 3.39 X10*6/UL (ref 4–5.2)
RSV PCR, RVP, VIRC: NOT DETECTED
SODIUM SERPL-SCNC: 137 MMOL/L (ref 136–145)
WBC # BLD AUTO: 16.1 X10*3/UL (ref 4.4–11.3)

## 2025-03-21 PROCEDURE — 85025 COMPLETE CBC W/AUTO DIFF WBC: CPT | Performed by: INTERNAL MEDICINE

## 2025-03-21 PROCEDURE — 99232 SBSQ HOSP IP/OBS MODERATE 35: CPT | Performed by: INTERNAL MEDICINE

## 2025-03-21 PROCEDURE — 36415 COLL VENOUS BLD VENIPUNCTURE: CPT | Performed by: INTERNAL MEDICINE

## 2025-03-21 PROCEDURE — 2060000001 HC INTERMEDIATE ICU ROOM DAILY

## 2025-03-21 PROCEDURE — 2500000004 HC RX 250 GENERAL PHARMACY W/ HCPCS (ALT 636 FOR OP/ED): Performed by: INTERNAL MEDICINE

## 2025-03-21 PROCEDURE — 2500000002 HC RX 250 W HCPCS SELF ADMINISTERED DRUGS (ALT 637 FOR MEDICARE OP, ALT 636 FOR OP/ED): Performed by: INTERNAL MEDICINE

## 2025-03-21 PROCEDURE — 2500000001 HC RX 250 WO HCPCS SELF ADMINISTERED DRUGS (ALT 637 FOR MEDICARE OP): Performed by: NURSE PRACTITIONER

## 2025-03-21 PROCEDURE — 2500000001 HC RX 250 WO HCPCS SELF ADMINISTERED DRUGS (ALT 637 FOR MEDICARE OP): Performed by: INTERNAL MEDICINE

## 2025-03-21 PROCEDURE — 94640 AIRWAY INHALATION TREATMENT: CPT

## 2025-03-21 PROCEDURE — 2500000004 HC RX 250 GENERAL PHARMACY W/ HCPCS (ALT 636 FOR OP/ED): Performed by: NURSE PRACTITIONER

## 2025-03-21 PROCEDURE — 84075 ASSAY ALKALINE PHOSPHATASE: CPT | Performed by: INTERNAL MEDICINE

## 2025-03-21 RX ADMIN — CEFTRIAXONE SODIUM 2 G: 2 INJECTION, POWDER, FOR SOLUTION INTRAMUSCULAR; INTRAVENOUS at 11:24

## 2025-03-21 RX ADMIN — BENZONATATE 100 MG: 100 CAPSULE ORAL at 22:38

## 2025-03-21 RX ADMIN — ASPIRIN 81 MG: 81 TABLET, COATED ORAL at 08:35

## 2025-03-21 RX ADMIN — HEPARIN SODIUM 7500 UNITS: 5000 INJECTION INTRAVENOUS; SUBCUTANEOUS at 22:19

## 2025-03-21 RX ADMIN — CEFTRIAXONE SODIUM 2 G: 2 INJECTION, POWDER, FOR SOLUTION INTRAMUSCULAR; INTRAVENOUS at 22:19

## 2025-03-21 RX ADMIN — Medication 5 MG: at 20:47

## 2025-03-21 RX ADMIN — HEPARIN SODIUM 7500 UNITS: 5000 INJECTION INTRAVENOUS; SUBCUTANEOUS at 06:01

## 2025-03-21 RX ADMIN — PANTOPRAZOLE SODIUM 40 MG: 40 TABLET, DELAYED RELEASE ORAL at 06:01

## 2025-03-21 RX ADMIN — HEPARIN SODIUM 7500 UNITS: 5000 INJECTION INTRAVENOUS; SUBCUTANEOUS at 14:14

## 2025-03-21 RX ADMIN — GUAIFENESIN 600 MG: 600 TABLET, EXTENDED RELEASE ORAL at 22:38

## 2025-03-21 RX ADMIN — IPRATROPIUM BROMIDE AND ALBUTEROL SULFATE 3 ML: 2.5; .5 SOLUTION RESPIRATORY (INHALATION) at 22:46

## 2025-03-21 ASSESSMENT — PAIN SCALES - GENERAL
PAINLEVEL_OUTOF10: 0 - NO PAIN
PAINLEVEL_OUTOF10: 0 - NO PAIN

## 2025-03-21 ASSESSMENT — COGNITIVE AND FUNCTIONAL STATUS - GENERAL
DAILY ACTIVITIY SCORE: 23
MOVING FROM LYING ON BACK TO SITTING ON SIDE OF FLAT BED WITH BEDRAILS: A LITTLE
WALKING IN HOSPITAL ROOM: A LITTLE
DRESSING REGULAR UPPER BODY CLOTHING: A LITTLE
CLIMB 3 TO 5 STEPS WITH RAILING: A LITTLE
TURNING FROM BACK TO SIDE WHILE IN FLAT BAD: A LITTLE
MOVING TO AND FROM BED TO CHAIR: A LITTLE
MOBILITY SCORE: 18
STANDING UP FROM CHAIR USING ARMS: A LITTLE

## 2025-03-21 ASSESSMENT — PAIN - FUNCTIONAL ASSESSMENT
PAIN_FUNCTIONAL_ASSESSMENT: 0-10
PAIN_FUNCTIONAL_ASSESSMENT: 0-10

## 2025-03-21 ASSESSMENT — ACTIVITIES OF DAILY LIVING (ADL)
LACK_OF_TRANSPORTATION: NO
LACK_OF_TRANSPORTATION: NO

## 2025-03-21 NOTE — PROGRESS NOTES
Infectious Disease Progress Note       3/20/2025    Patient is a followup regarding headache with sore throat, history of blisters in the mouth, bilateral ear pain, mild transaminitis, fevers which are now trending down, with consideration for meningitis    Marked leukocytosis noted today and procalcitonin is 92.  Despite this, patient actually appears clinically well.    Patient had lumbar puncture, no evidence of meningitis.  Isolation discontinued.  C. difficile test was negative as well.  Diarrhea has somewhat come down.    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    1 out of 2 blood cultures from 3/17/2025 with gram-negative bacilli anaerobe that popped positive today  Lab Results   Component Value Date    WBC 24.2 (H) 03/20/2025    HGB 10.2 (L) 03/20/2025    HCT 29.2 (L) 03/20/2025    MCV 88 03/20/2025     03/20/2025     Lab Results   Component Value Date    GLUCOSE 115 (H) 03/20/2025    CALCIUM 8.2 (L) 03/20/2025     03/20/2025    K 3.1 (L) 03/20/2025    CO2 22 03/20/2025     03/20/2025    BUN 23 03/20/2025    CREATININE 1.46 (H) 03/20/2025       WBC trends are being monitored. Antibiotic doses are being adjusted per most recent renal labs.     Vitals:    03/20/25 1940   BP: 118/60   Pulse: 96   Resp:    Temp: 36.8 °C (98.2 °F)   SpO2: 92%     Obese patient no acute distress  No conversational dyspnea, no tachypnea, no cough  Neck supple  Heart S1-S2 but tachycardic at 102  Lungs bilaterally diminished but clear  Abdomen soft nondistended  Extremities no pain to palpation, no blisters  No oral blisters or evidence of thrush at the present time    Patient Active Problem List   Diagnosis    Abnormal weight gain    Acute non-recurrent maxillary sinusitis    Anxiety disorder    Atypical ductal hyperplasia of breast    Breast neoplasm, Tis (LCIS)    Dysfunction of right eustachian tube    Dyslipidemia    Hypertension    Migraine     Primary osteoarthritis of both knees    Vitamin D deficiency    Weight gain    Essential (primary) hypertension    Acute bacterial sinusitis    Medicare annual wellness visit, subsequent    Adjustment disorder with anxious mood    Carpal tunnel syndrome, right upper limb    Body mass index (BMI) 40.0-44.9, adult (Multi)    Hyperglycemia    HEDY (acute kidney injury) (CMS-[facility])    Headache           ASSESSMENT:  Bacteremia 1 out of 2 gram-negative bacilli on prelim culture from 3/17/2025 that popped positive today  Sepsis with history of headaches, sore throat, oral lesions, transaminitis, bilateral ear pain with possible exposure to hand-foot-and-mouth disease at grandchildren's       PLAN:  Follow-up final culture results  No evidence of meningitis  DC Meningitis isolation  No C diff - dc contact plus precautions  ?HFM disease with complication - rule out cardiac consequences of coxsackie A. She is still tachycardic.  Elevated troponins noted.  Headache is better.  Clinically she looks better  Fevers are also better.   Echo pending.   DC Ampicillin.   Possible discontinuation of vancomycin in the morning on 3/20/2025  Patient is currently empirically on Vanco and Ceftriaxone right now       Imaging and labs were reviewed per medical records and any ID pertinent labs were also addressed  Time spent before, during and after care today, including coordination of care >40 min      Carolann Haji, DO

## 2025-03-21 NOTE — PROGRESS NOTES
Medical Group Progress Note  ASSESSMENT & PLAN:     Sepsis with metabolic encephalopathy and elevated LFTs  Proteus Mirabalis bacteremia  -source either UTI, given headache consideration for meningitis as well  -for now cont abx to cover meningitis including vanc, rocephin, ampicillin  -ID consulted  -tentative plan for LP with IR tomorrow mornign  -follow cultures      Intractable headache  Hx: migraine  Hx: anxiety  - CTA head neck showing developmental abnormality without evidence of infarction or dissection  - consult Neuro   - Consider ordering MRI of brain in a.m. defer to specialist - low suspicion for acute infarction   - regular diet   - pain control  - telemetry overnight     HEDY  Hypokalemia [2.7]   - Baseline SCr 0.85, on admission SCr 1.61, GFR 34  - Replace and recheck electrolytes     Transaminitis  - No complaints of abdominal pain monitor and trend,      - resume home meds once rec complete and clinically appropriate      VTE Prophylaxis: Heparin subcu    3/21/25  Leukocytosis continues to improve; clinically she looks very well  Cont with rocephin for now  X1 blood culture growing proteus; suspect urine as source  Suspect trop elevation more related to sepsis and tachycardia than a myocarditis; echo showed low normal EF, no wall motion abnormalities, no significant valvular disease  Cont asa for now  Cr continues to improve; having great oral intake  LFTs mildly elevated, stable; can continue workup on outpatient basis; RUQ us showed some heterogenous liver echotexture, no mass, no ductal dilatation; absent gallbladder        Glen Felton MD    SUBJECTIVE     No overnight events. Feeling excellent today. Great appetite. No chest pain or dyspnea. No fever.     OBJECTIVE:     Last Recorded Vitals:  Vitals:    03/21/25 0001 03/21/25 0332 03/21/25 0727 03/21/25 1122   BP: 115/55 121/71 161/83 129/68   Pulse: 97 94 87 83   Resp:   18 18   Temp: 36.9 °C (98.4 °F) 36.5 °C (97.7 °F) 36.9 °C (98.4  °F) 37 °C (98.6 °F)   TempSrc:       SpO2: 92% 92% 95% 92%   Weight:       Height:         Last I/O:  No intake/output data recorded.    Physical Exam  GEN: comfortable, appears stated age  HEENT: NCAT, PERRLA, EOMI, moist mucous membranes  NECK: supple, no masses  CV: RRR, no m/r/g, no LE edema  LUNGS: CTAB, no w/r/c  ABD: soft, NT, ND, NBS  SKIN: no rashes  MSK; no gross deformities, normal joints  NEURO: A+Ox3, no FND  PSYCH: appropriate mood, affect      Inpatient Medications:  aspirin, 81 mg, oral, Daily  [Held by provider] atorvastatin, 40 mg, oral, Daily  cefTRIAXone, 2 g, intravenous, q12h  heparin (porcine), 7,500 Units, subcutaneous, q8h ALVIN  melatonin, 5 mg, oral, Daily  [Held by provider] metoprolol succinate XL, 50 mg, oral, Daily  pantoprazole, 40 mg, oral, Daily    PRN Medications  PRN medications: acetaminophen **OR** acetaminophen **OR** acetaminophen, acetaminophen, benzocaine-menthol, benzonatate, guaiFENesin, ipratropium-albuteroL, lidocaine, ondansetron **OR** ondansetron  Continuous Medications:       LABS AND IMAGING:     Labs:  Results from last 7 days   Lab Units 03/21/25 0627 03/20/25  0553 03/19/25  0602   WBC AUTO x10*3/uL 16.1* 24.2* 32.1*   RBC AUTO x10*6/uL 3.39* 3.31* 3.42*   HEMOGLOBIN g/dL 10.3* 10.2* 10.7*   HEMATOCRIT % 29.9* 29.2* 30.2*   MCV fL 88 88 88   MCH pg 30.4 30.8 31.3   MCHC g/dL 34.4 34.9 35.4   RDW % 15.6* 15.4* 14.9*   PLATELETS AUTO x10*3/uL 288 245 222     Results from last 7 days   Lab Units 03/21/25 0627 03/20/25  0553 03/19/25  0602   SODIUM mmol/L 137 136 137   POTASSIUM mmol/L 3.9 3.1* 3.1*   CHLORIDE mmol/L 107 104 106   CO2 mmol/L 21 22 20*   BUN mg/dL 19 23 27*   CREATININE mg/dL 1.21* 1.46* 1.89*   GLUCOSE mg/dL 110* 115* 99   PROTEIN TOTAL g/dL 6.5 6.3* 6.3*   CALCIUM mg/dL 8.3* 8.2* 8.1*   BILIRUBIN TOTAL mg/dL 0.5 0.5 0.8   ALK PHOS U/L 243* 249* 217*   AST U/L 56* 58* 77*   ALT U/L 53* 52* 60*     Results from last 7 days   Lab Units 03/20/25  0553  03/19/25  0602 03/18/25  0546   MAGNESIUM mg/dL 2.04 1.89 1.39*     Results from last 7 days   Lab Units 03/19/25  1441 03/19/25  0602   TROPHS ng/L 79* 105*     Imaging:  XR chest 1 view  Narrative: Interpreted By:  César Vargas,   STUDY:  XR CHEST 1 VIEW;  3/20/2025 11:06 am      INDICATION:  Signs/Symptoms:Increasing cough, hypoxia in setting of sepsis with  unclear source.          COMPARISON:  Portable chest, 17 March 2025      ACCESSION NUMBER(S):  [id number]      ORDERING CLINICIAN:  SORAIDA STERN      TECHNIQUE:  Single frontal view of the chest; Portable technique      FINDINGS:      The cardiomediastinal silhouette is unchanged      Low lung volumes, without infiltrate, edema, effusion or pneumothorax      Impression: NO INTERVAL CHANGE OR ACUTE DISEASE IN THE CHEST      MACRO:  None      Signed by: César Vargas 3/20/2025 12:14 PM  Dictation workstation:   [id number]  Transthoracic Echo (TTE) [place] [address]   Tel [phone] Fax [phone]    TRANSTHORACIC ECHOCARDIOGRAM REPORT    Patient Name:       JENNIFER SANON      Reading Physician:    [id number] Aj Patino MD, [facility]  Study Date:         3/20/2025           Ordering Provider:    [id number] SORAIDA STERN  MRN/PID:            [id number]            Fellow:  Accession#:         [id number]        Nurse:  Date of Birth/Age:  1955 / 69 years Sonographer:          Patricia Betts                                                                [facility]  Gender Assigned at  F                   Additional Staff:  Birth:  Height:             152.40 cm           Admit Date:           3/17/2025  Weight:             95.71 kg            Admission Status:     Inpatient -                                                                Routine  BSA / BMI:          1.91 m2 /  41.21     Department Location:  [facility]                      kg/m2                                     Echo Lab  Blood Pressure: 129 /64 mmHg    Study Type:    TRANSTHORACIC ECHO (TTE) COMPLETE  Diagnosis/ICD: Myocarditis, unspecified-I51.4  Indication:    Evaluate for Myocarditis  CPT Codes:     Echo Complete w Full [id number]    Patient History:  Pertinent History: HTN and Hyperlipidemia.    Study Detail: The following Echo studies were performed: 2D, M-Mode, Doppler and                color flow. Definity used as a contrast agent for endocardial                border definition. Total contrast used for this procedure was 2 mL                via IV push.       PHYSICIAN INTERPRETATION:  Left Ventricle: The left ventricular systolic function is low normal, with a visually estimated ejection fraction of 50-55%. There is mild concentric left ventricular hypertrophy. There are no regional wall motion abnormalities. The left ventricular cavity size is normal. There is mild increased septal and mildly increased posterior left ventricular wall thickness. Spectral Doppler shows a Grade I (impaired relaxation pattern) of left ventricular diastolic filling with normal left atrial filling pressure.  Left Atrium: The left atrial size is normal. Left atrial volume index 28.4 mL/m2.  Right Ventricle: The right ventricle is normal in size. There is normal right ventricular global systolic function.  Right Atrium: The right atrial size is normal.  Aortic Valve: The aortic valve is trileaflet. The aortic valve dimensionless index is 0.82. There is no evidence of aortic valve regurgitation. The peak instantaneous gradient of the aortic valve is 7 mmHg. The mean gradient of the aortic valve is 4 mmHg.  Mitral Valve: The mitral valve is normal in structure. There is no evidence of mitral valve regurgitation.  Tricuspid Valve: The tricuspid valve is structurally normal. No evidence of tricuspid regurgitation. Trivial  tricuspid regurgitation with estimated RVSP 21 mmHg.  Pulmonic Valve: The pulmonic valve is structurally normal. There is no indication of pulmonic valve regurgitation.  Pericardium: Trivial pericardial effusion. The effusion is circumferential.  Aorta: The aortic root is normal.  Systemic Veins: The inferior vena cava appears normal in size, with IVC inspiratory collapse greater than 50%.  In comparison to the previous echocardiogram(s): No previous available for comparison.       CONCLUSIONS:   1. The left ventricular systolic function is low normal, with a visually estimated ejection fraction of 50-55%.   2. No regional wall motion abnormalities.   3. Spectral Doppler shows a Grade I (impaired relaxation pattern) of left ventricular diastolic filling with normal left atrial filling pressure.   4. Trivial tricuspid regurgitation with estimated RVSP 21 mmHg.   5. No previous available for comparison.    QUANTITATIVE DATA SUMMARY:     2D MEASUREMENTS:             Normal Ranges:  Ao Root d:       3.31 cm     (2.0-3.7cm)  LAs:             4.30 cm     (2.7-4.0cm)  IVSd:            1.09 cm     (0.6-1.1cm)  LVPWd:           1.05 cm     (0.6-1.1cm)  LVIDd:           4.75 cm     (3.9-5.9cm)  LVIDs:           3.25 cm  LV Mass Index:   96.1 g/m2  LVEDV Index:     63.77 ml/m2  LV % FS          31.6 %       LEFT ATRIUM:                  Normal Ranges:  LA Vol A4C:        60.9 ml    (22+/-6mL/m2)  LA Vol A2C:        48.1 ml  LA Vol BP:         54.2 ml  LA Vol Index A4C:  31.9ml/m2  LA Vol Index A2C:  25.2 ml/m2  LA Vol Index BP:   28.4 ml/m2  LA Area A4C:       21.0 cm2  LA Area A2C:       18.6 cm2  LA Major Axis A4C: 6.2 cm  LA Major Axis A2C: 6.1 cm  LA Volume Index:   27.3 ml/m2       RIGHT ATRIUM:                 Normal Ranges:  RA Vol A4C:        40.5 ml    (8.3-19.5ml)  RA Vol Index A4C:  21.2 ml/m2  RA Area A4C:       16.9 cm2  RA Major Axis A4C: 6.0 cm       AORTA MEASUREMENTS:         Normal Ranges:  Asc Ao, d:           3.22 cm (2.1-3.4cm)       LV SYSTOLIC FUNCTION:                       Normal Ranges:  EF-A4C View:    56 % (>=55%)  EF-A2C View:    55 %  EF-Biplane:     53 %  EF-Visual:      53 %  LV EF Reported: 53 %       LV DIASTOLIC FUNCTION:           Normal Ranges:  MV Peak E:             0.76 m/s  (0.7-1.2 m/s)  MV Peak A:             1.15 m/s  (0.42-0.7 m/s)  E/A Ratio:             0.66      (1.0-2.2)  MV e'                  0.085 m/s (>8.0)  MV lateral e'          0.10 m/s  MV medial e'           0.07 m/s  E/e' Ratio:            8.96      (<8.0)       MITRAL VALVE:          Normal Ranges:  MV DT:        170 msec (150-240msec)       AORTIC VALVE:                     Normal Ranges:  AoV Vmax:                1.28 m/s (<=1.7m/s)  AoV Peak P.6 mmHg (<20mmHg)  AoV Mean P.0 mmHg (1.7-11.5mmHg)  LVOT Max Dinh:            1.05 m/s (<=1.1m/s)  AoV VTI:                 24.90 cm (18-25cm)  LVOT VTI:                20.30 cm  LVOT Diameter:           2.18 cm  (1.8-2.4cm)  AoV Area, VTI:           3.04 cm2 (2.5-5.5cm2)  AoV Area,Vmax:           3.06 cm2 (2.5-4.5cm2)  AoV Dimensionless Index: 0.82       RIGHT VENTRICLE:  RV Basal 3.39 cm  RV Mid   2.69 cm  RV Major 7.4 cm  TAPSE:   17.8 mm  RV s'    0.15 m/s       TRICUSPID VALVE/RVSP:          Normal Ranges:  Peak TR Velocity:     2.13 m/s  RV Syst Pressure:     21 mmHg  (< 30mmHg)  IVC Diam:             1.58 cm       PULMONIC VALVE:          Normal Ranges:  PV Accel Time:  92 msec  (>120ms)  PV Max Dinh:     1.2 m/s  (0.6-0.9m/s)  PV Max P.4 mmHg       [id number] Aj Patino MD, FACC  Electronically signed on 3/20/2025 at 10:42:58 AM       ** Final **

## 2025-03-21 NOTE — CARE PLAN
The patient's goals for the shift include  rest    The clinical goals for the shift include Patient will report increased comfort throughout shift

## 2025-03-21 NOTE — PROGRESS NOTES
Infectious Disease Progress Note       3/21/2025    Patient is a followup regarding headache with sore throat, history of blisters in the mouth, bilateral ear pain, mild transaminitis, fevers which are now trending down, with consideration for meningitis        Patient had lumbar puncture, no evidence of meningitis.  Isolation discontinued.  C. difficile test was negative as well.  Diarrhea has somewhat come down.    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    1 out of 2 blood cultures from 3/17/2025 with gram-negative bacilli   Lab Results   Component Value Date    WBC 16.1 (H) 03/21/2025    HGB 10.3 (L) 03/21/2025    HCT 29.9 (L) 03/21/2025    MCV 88 03/21/2025     03/21/2025     Lab Results   Component Value Date    GLUCOSE 110 (H) 03/21/2025    CALCIUM 8.3 (L) 03/21/2025     03/21/2025    K 3.9 03/21/2025    CO2 21 03/21/2025     03/21/2025    BUN 19 03/21/2025    CREATININE 1.21 (H) 03/21/2025       WBC trends are being monitored. Antibiotic doses are being adjusted per most recent renal labs.     Vitals:    03/21/25 1538   BP: 158/77   Pulse: 81   Resp: 18   Temp: 36.7 °C (98.1 °F)   SpO2: 96%     Obese patient no acute distress  No conversational dyspnea, no tachypnea, no cough  Neck supple  Heart S1-S2 but tachycardic at 102  Lungs bilaterally diminished but clear  Abdomen soft nondistended  Extremities no pain to palpation, no blisters  No oral blisters or evidence of thrush at the present time    Patient Active Problem List   Diagnosis    Abnormal weight gain    Acute non-recurrent maxillary sinusitis    Anxiety disorder    Atypical ductal hyperplasia of breast    Breast neoplasm, Tis (LCIS)    Dysfunction of right eustachian tube    Dyslipidemia    Hypertension    Migraine    Primary osteoarthritis of both knees    Vitamin D deficiency    Weight gain    Essential (primary) hypertension    Acute bacterial sinusitis     Medicare annual wellness visit, subsequent    Adjustment disorder with anxious mood    Carpal tunnel syndrome, right upper limb    Body mass index (BMI) 40.0-44.9, adult (Multi)    Hyperglycemia    HEDY (acute kidney injury) (CMS-[facility])    Headache           ASSESSMENT:  Bacteremia 1 out of 2 gram-negative bacilli on prelim culture from 3/17/2025 that popped positive today  Sepsis with history of headaches, sore throat, oral lesions, transaminitis, bilateral ear pain with possible exposure to hand-foot-and-mouth disease at grandchildren's       PLAN:  Follow-up final culture results  No evidence of meningitis  D  Fevers are also better.   Echo pending.   DC Ampicillin.    Ceftriaxone right now           Tigre Hernandez MD

## 2025-03-21 NOTE — PROGRESS NOTES
[facility] Met with patient and she is going home with no needs. She resides with multiple family members and is independent. Does not want [facility] Home.

## 2025-03-22 LAB
ALBUMIN SERPL BCP-MCNC: 2.6 G/DL (ref 3.4–5)
ALP SERPL-CCNC: 232 U/L (ref 33–136)
ALT SERPL W P-5'-P-CCNC: 52 U/L (ref 7–45)
ANION GAP SERPL CALC-SCNC: 13 MMOL/L (ref 10–20)
AST SERPL W P-5'-P-CCNC: 53 U/L (ref 9–39)
BACTERIA BLD AEROBE CULT: ABNORMAL
BACTERIA BLD CULT: ABNORMAL
BACTERIA BLD CULT: NORMAL
BACTERIA CSF CULT: NORMAL
BASOPHILS # BLD AUTO: 0.06 X10*3/UL (ref 0–0.1)
BASOPHILS NFR BLD AUTO: 0.5 %
BILIRUB SERPL-MCNC: 0.5 MG/DL (ref 0–1.2)
BUN SERPL-MCNC: 16 MG/DL (ref 6–23)
CALCIUM SERPL-MCNC: 8 MG/DL (ref 8.6–10.3)
CHLORIDE SERPL-SCNC: 105 MMOL/L (ref 98–107)
CO2 SERPL-SCNC: 24 MMOL/L (ref 21–32)
CREAT SERPL-MCNC: 0.98 MG/DL (ref 0.5–1.05)
EGFRCR SERPLBLD CKD-EPI 2021: 62 ML/MIN/1.73M*2
EOSINOPHIL # BLD AUTO: 0.41 X10*3/UL (ref 0–0.7)
EOSINOPHIL NFR BLD AUTO: 3.4 %
ERYTHROCYTE [DISTWIDTH] IN BLOOD BY AUTOMATED COUNT: 15.7 % (ref 11.5–14.5)
GLUCOSE SERPL-MCNC: 117 MG/DL (ref 74–99)
GRAM STN SPEC: ABNORMAL
GRAM STN SPEC: NORMAL
GRAM STN SPEC: NORMAL
HCT VFR BLD AUTO: 26.8 % (ref 36–46)
HGB BLD-MCNC: 9.3 G/DL (ref 12–16)
HOLD SPECIMEN: NORMAL
HOLD SPECIMEN: NORMAL
IMM GRANULOCYTES # BLD AUTO: 0.27 X10*3/UL (ref 0–0.7)
IMM GRANULOCYTES NFR BLD AUTO: 2.3 % (ref 0–0.9)
LYMPHOCYTES # BLD AUTO: 1.48 X10*3/UL (ref 1.2–4.8)
LYMPHOCYTES NFR BLD AUTO: 12.4 %
MCH RBC QN AUTO: 30.6 PG (ref 26–34)
MCHC RBC AUTO-ENTMCNC: 34.7 G/DL (ref 32–36)
MCV RBC AUTO: 88 FL (ref 80–100)
MONOCYTES # BLD AUTO: 0.56 X10*3/UL (ref 0.1–1)
MONOCYTES NFR BLD AUTO: 4.7 %
NEUTROPHILS # BLD AUTO: 9.16 X10*3/UL (ref 1.2–7.7)
NEUTROPHILS NFR BLD AUTO: 76.7 %
NRBC BLD-RTO: 0 /100 WBCS (ref 0–0)
PLATELET # BLD AUTO: 277 X10*3/UL (ref 150–450)
POTASSIUM SERPL-SCNC: 4.8 MMOL/L (ref 3.5–5.3)
PROT SERPL-MCNC: 6.2 G/DL (ref 6.4–8.2)
RBC # BLD AUTO: 3.04 X10*6/UL (ref 4–5.2)
SODIUM SERPL-SCNC: 137 MMOL/L (ref 136–145)
WBC # BLD AUTO: 11.9 X10*3/UL (ref 4.4–11.3)

## 2025-03-22 PROCEDURE — 2500000001 HC RX 250 WO HCPCS SELF ADMINISTERED DRUGS (ALT 637 FOR MEDICARE OP): Performed by: NURSE PRACTITIONER

## 2025-03-22 PROCEDURE — 2500000004 HC RX 250 GENERAL PHARMACY W/ HCPCS (ALT 636 FOR OP/ED): Performed by: INTERNAL MEDICINE

## 2025-03-22 PROCEDURE — 85025 COMPLETE CBC W/AUTO DIFF WBC: CPT | Performed by: INTERNAL MEDICINE

## 2025-03-22 PROCEDURE — 87075 CULTR BACTERIA EXCEPT BLOOD: CPT | Mod: ELYLAB | Performed by: INTERNAL MEDICINE

## 2025-03-22 PROCEDURE — 2060000001 HC INTERMEDIATE ICU ROOM DAILY

## 2025-03-22 PROCEDURE — 87040 BLOOD CULTURE FOR BACTERIA: CPT | Mod: ELYLAB | Performed by: INTERNAL MEDICINE

## 2025-03-22 PROCEDURE — 36415 COLL VENOUS BLD VENIPUNCTURE: CPT | Performed by: INTERNAL MEDICINE

## 2025-03-22 PROCEDURE — 84075 ASSAY ALKALINE PHOSPHATASE: CPT | Performed by: INTERNAL MEDICINE

## 2025-03-22 PROCEDURE — 2500000001 HC RX 250 WO HCPCS SELF ADMINISTERED DRUGS (ALT 637 FOR MEDICARE OP): Performed by: INTERNAL MEDICINE

## 2025-03-22 PROCEDURE — 2500000004 HC RX 250 GENERAL PHARMACY W/ HCPCS (ALT 636 FOR OP/ED): Performed by: NURSE PRACTITIONER

## 2025-03-22 PROCEDURE — 99232 SBSQ HOSP IP/OBS MODERATE 35: CPT | Performed by: INTERNAL MEDICINE

## 2025-03-22 RX ADMIN — HEPARIN SODIUM 7500 UNITS: 5000 INJECTION INTRAVENOUS; SUBCUTANEOUS at 14:46

## 2025-03-22 RX ADMIN — BENZONATATE 100 MG: 100 CAPSULE ORAL at 20:19

## 2025-03-22 RX ADMIN — METOPROLOL SUCCINATE 50 MG: 50 TABLET, EXTENDED RELEASE ORAL at 09:04

## 2025-03-22 RX ADMIN — CEFTRIAXONE SODIUM 2 G: 2 INJECTION, POWDER, FOR SOLUTION INTRAMUSCULAR; INTRAVENOUS at 11:09

## 2025-03-22 RX ADMIN — HEPARIN SODIUM 7500 UNITS: 5000 INJECTION INTRAVENOUS; SUBCUTANEOUS at 06:26

## 2025-03-22 RX ADMIN — Medication 5 MG: at 20:20

## 2025-03-22 RX ADMIN — ASPIRIN 81 MG: 81 TABLET, COATED ORAL at 09:04

## 2025-03-22 RX ADMIN — ACETAMINOPHEN 650 MG: 325 TABLET ORAL at 09:04

## 2025-03-22 RX ADMIN — BENZOCAINE AND MENTHOL 1 LOZENGE: 15; 3.6 LOZENGE ORAL at 20:19

## 2025-03-22 RX ADMIN — PANTOPRAZOLE SODIUM 40 MG: 40 TABLET, DELAYED RELEASE ORAL at 06:27

## 2025-03-22 RX ADMIN — ACETAMINOPHEN 650 MG: 325 TABLET ORAL at 22:17

## 2025-03-22 RX ADMIN — CEFTRIAXONE SODIUM 2 G: 2 INJECTION, POWDER, FOR SOLUTION INTRAMUSCULAR; INTRAVENOUS at 22:11

## 2025-03-22 RX ADMIN — HEPARIN SODIUM 7500 UNITS: 5000 INJECTION INTRAVENOUS; SUBCUTANEOUS at 22:15

## 2025-03-22 RX ADMIN — GUAIFENESIN 600 MG: 600 TABLET, EXTENDED RELEASE ORAL at 09:04

## 2025-03-22 ASSESSMENT — COGNITIVE AND FUNCTIONAL STATUS - GENERAL
DRESSING REGULAR UPPER BODY CLOTHING: A LITTLE
DAILY ACTIVITIY SCORE: 23
WALKING IN HOSPITAL ROOM: A LITTLE
MOVING FROM LYING ON BACK TO SITTING ON SIDE OF FLAT BED WITH BEDRAILS: A LITTLE
CLIMB 3 TO 5 STEPS WITH RAILING: A LITTLE
MOBILITY SCORE: 18
TURNING FROM BACK TO SIDE WHILE IN FLAT BAD: A LITTLE
DAILY ACTIVITIY SCORE: 23
DRESSING REGULAR UPPER BODY CLOTHING: A LITTLE
TURNING FROM BACK TO SIDE WHILE IN FLAT BAD: A LITTLE
STANDING UP FROM CHAIR USING ARMS: A LITTLE
STANDING UP FROM CHAIR USING ARMS: A LITTLE
MOVING TO AND FROM BED TO CHAIR: A LITTLE
CLIMB 3 TO 5 STEPS WITH RAILING: A LITTLE
WALKING IN HOSPITAL ROOM: A LITTLE
MOVING TO AND FROM BED TO CHAIR: A LITTLE
MOBILITY SCORE: 18
MOVING FROM LYING ON BACK TO SITTING ON SIDE OF FLAT BED WITH BEDRAILS: A LITTLE

## 2025-03-22 ASSESSMENT — PAIN DESCRIPTION - LOCATION: LOCATION: RIB CAGE

## 2025-03-22 ASSESSMENT — PAIN - FUNCTIONAL ASSESSMENT: PAIN_FUNCTIONAL_ASSESSMENT: UNABLE TO SELF-REPORT

## 2025-03-22 ASSESSMENT — PAIN SCALES - GENERAL
PAINLEVEL_OUTOF10: 2
PAINLEVEL_OUTOF10: 0 - NO PAIN

## 2025-03-22 NOTE — PROGRESS NOTES
Medical Group Progress Note  ASSESSMENT & PLAN:     Sepsis with metabolic encephalopathy and elevated LFTs  Proteus Mirabalis bacteremia  -source either UTI, given headache consideration for meningitis as well  -for now cont abx to cover meningitis including vanc, rocephin, ampicillin  -ID consulted  -tentative plan for LP with IR tomorrow mornign  -follow cultures      Intractable headache  Hx: migraine  Hx: anxiety  - CTA head neck showing developmental abnormality without evidence of infarction or dissection  - consult Neuro   - Consider ordering MRI of brain in a.m. defer to specialist - low suspicion for acute infarction   - regular diet   - pain control  - telemetry overnight     HEDY  Hypokalemia    - Baseline SCr 0.85, on admission SCr 1.61, GFR 34  - Replace and recheck electrolytes     Transaminitis  - No complaints of abdominal pain monitor and trend,      - resume home meds once rec complete and clinically appropriate      VTE Prophylaxis: Heparin subcu    3/22/25  Leukocytosis continues to improve; clinically she looks very well  Cont with rocephin for now  Repeat cultures pending  Cont rocephin for proteus  Will d/w ID plan for discharge antibiotics  Suspect trop elevation more related to sepsis and tachycardia than a myocarditis; echo showed low normal EF, no wall motion abnormalities, no significant valvular disease  Cont asa for now  Cr continues to improve; having great oral intake  Lfts stable; may have had some liver injury related to sepsis; RUQ us negative, viral hepatitis panel negative; d/w patient, she will need outpatient PCP follow up and repeat LFTs and further workup if still elevated after discharge        Glen Felton MD    SUBJECTIVE     No overnight events. Continues to feel well. Good appetite. No pain.     OBJECTIVE:     Last Recorded Vitals:  Vitals:    03/21/25 2344 03/22/25 0415 03/22/25 0828 03/22/25 1123   BP: 129/58 125/62 130/73 120/61   Pulse: 99 89 81 72   Resp:        Temp: 36.5 °C (97.7 °F) 36.8 °C (98.2 °F) 36.6 °C (97.9 °F) 36.4 °C (97.5 °F)   TempSrc:       SpO2: 94% 94% 93% 91%   Weight:       Height:         Last I/O:  I/O last 3 completed shifts:  In: 500 (5.2 mL/kg) [P.O.:500]  Out: - (0 mL/kg)   Weight: 95.7 kg     Physical Exam  GEN: comfortable, appears stated age  HEENT: NCAT, PERRLA, EOMI, moist mucous membranes  NECK: supple, no masses  CV: RRR, no m/r/g, no LE edema  LUNGS: CTAB, no w/r/c  ABD: soft, NT, ND, NBS  SKIN: no rashes  MSK; no gross deformities, normal joints  NEURO: A+Ox3, no FND  PSYCH: appropriate mood, affect      Inpatient Medications:  aspirin, 81 mg, oral, Daily  [Held by provider] atorvastatin, 40 mg, oral, Daily  cefTRIAXone, 2 g, intravenous, q12h  heparin (porcine), 7,500 Units, subcutaneous, q8h ALVIN  melatonin, 5 mg, oral, Daily  metoprolol succinate XL, 50 mg, oral, Daily  pantoprazole, 40 mg, oral, Daily    PRN Medications  PRN medications: acetaminophen **OR** acetaminophen **OR** acetaminophen, acetaminophen, benzocaine-menthol, benzonatate, guaiFENesin, ipratropium-albuteroL, lidocaine, ondansetron **OR** ondansetron  Continuous Medications:       LABS AND IMAGING:     Labs:  Results from last 7 days   Lab Units 03/22/25  0649 03/21/25  0627 03/20/25  0553   WBC AUTO x10*3/uL 11.9* 16.1* 24.2*   RBC AUTO x10*6/uL 3.04* 3.39* 3.31*   HEMOGLOBIN g/dL 9.3* 10.3* 10.2*   HEMATOCRIT % 26.8* 29.9* 29.2*   MCV fL 88 88 88   MCH pg 30.6 30.4 30.8   MCHC g/dL 34.7 34.4 34.9   RDW % 15.7* 15.6* 15.4*   PLATELETS AUTO x10*3/uL 277 288 245     Results from last 7 days   Lab Units 03/22/25  0650 03/21/25  0627 03/20/25  0553   SODIUM mmol/L 137 137 136   POTASSIUM mmol/L 4.8 3.9 3.1*   CHLORIDE mmol/L 105 107 104   CO2 mmol/L 24 21 22   BUN mg/dL 16 19 23   CREATININE mg/dL 0.98 1.21* 1.46*   GLUCOSE mg/dL 117* 110* 115*   PROTEIN TOTAL g/dL 6.2* 6.5 6.3*   CALCIUM mg/dL 8.0* 8.3* 8.2*   BILIRUBIN TOTAL mg/dL 0.5 0.5 0.5   ALK PHOS U/L 232* 243*  249*   AST U/L 53* 56* 58*   ALT U/L 52* 53* 52*     Results from last 7 days   Lab Units 03/20/25  0553 03/19/25  0602 03/18/25  0546   MAGNESIUM mg/dL 2.04 1.89 1.39*     Results from last 7 days   Lab Units 03/19/25  1441 03/19/25  0602   TROPHS ng/L 79* 105*     Imaging:  XR chest 1 view  Narrative: Interpreted By:  César Vargas,   STUDY:  XR CHEST 1 VIEW;  3/20/2025 11:06 am      INDICATION:  Signs/Symptoms:Increasing cough, hypoxia in setting of sepsis with  unclear source.          COMPARISON:  Portable chest, 17 March 2025      ACCESSION NUMBER(S):  [id number]      ORDERING CLINICIAN:  SORAIDA STERN      TECHNIQUE:  Single frontal view of the chest; Portable technique      FINDINGS:      The cardiomediastinal silhouette is unchanged      Low lung volumes, without infiltrate, edema, effusion or pneumothorax      Impression: NO INTERVAL CHANGE OR ACUTE DISEASE IN THE CHEST      MACRO:  None      Signed by: César Vargas 3/20/2025 12:14 PM  Dictation workstation:   [id number]  Transthoracic Echo (TTE) [place] [id number]   Tel [phone] Fax [phone]    TRANSTHORACIC ECHOCARDIOGRAM REPORT    Patient Name:       JENNIFER KIRBY MAGNOEILEEN      Reading Physician:    [id number] Aj Patino MD, [facility]  Study Date:         3/20/2025           Ordering Provider:    [id number] SORAIDA STERN  MRN/PID:            [id number]            Fellow:  Accession#:         [id number]        Nurse:  Date of Birth/Age:  1955 / 69 years Sonographer:          Patricia Betts                                                                [facility]  Gender Assigned at  F                   Additional Staff:  Birth:  Height:             152.40 cm           Admit Date:           3/17/2025  Weight:             95.71 kg            Admission Status:     Inpatient  -                                                                Routine  BSA / BMI:          1.91 m2 / 41.21     Department Location:  [facility]                      kg/m2                                     Echo Lab  Blood Pressure: 129 /64 mmHg    Study Type:    TRANSTHORACIC ECHO (TTE) COMPLETE  Diagnosis/ICD: Myocarditis, unspecified-I51.4  Indication:    Evaluate for Myocarditis  CPT Codes:     Echo Complete w Full [id number]    Patient History:  Pertinent History: HTN and Hyperlipidemia.    Study Detail: The following Echo studies were performed: 2D, M-Mode, Doppler and                color flow. Definity used as a contrast agent for endocardial                border definition. Total contrast used for this procedure was 2 mL                via IV push.       PHYSICIAN INTERPRETATION:  Left Ventricle: The left ventricular systolic function is low normal, with a visually estimated ejection fraction of 50-55%. There is mild concentric left ventricular hypertrophy. There are no regional wall motion abnormalities. The left ventricular cavity size is normal. There is mild increased septal and mildly increased posterior left ventricular wall thickness. Spectral Doppler shows a Grade I (impaired relaxation pattern) of left ventricular diastolic filling with normal left atrial filling pressure.  Left Atrium: The left atrial size is normal. Left atrial volume index 28.4 mL/m2.  Right Ventricle: The right ventricle is normal in size. There is normal right ventricular global systolic function.  Right Atrium: The right atrial size is normal.  Aortic Valve: The aortic valve is trileaflet. The aortic valve dimensionless index is 0.82. There is no evidence of aortic valve regurgitation. The peak instantaneous gradient of the aortic valve is 7 mmHg. The mean gradient of the aortic valve is 4 mmHg.  Mitral Valve: The mitral valve is normal in structure. There is no evidence of mitral valve regurgitation.  Tricuspid  Valve: The tricuspid valve is structurally normal. No evidence of tricuspid regurgitation. Trivial tricuspid regurgitation with estimated RVSP 21 mmHg.  Pulmonic Valve: The pulmonic valve is structurally normal. There is no indication of pulmonic valve regurgitation.  Pericardium: Trivial pericardial effusion. The effusion is circumferential.  Aorta: The aortic root is normal.  Systemic Veins: The inferior vena cava appears normal in size, with IVC inspiratory collapse greater than 50%.  In comparison to the previous echocardiogram(s): No previous available for comparison.       CONCLUSIONS:   1. The left ventricular systolic function is low normal, with a visually estimated ejection fraction of 50-55%.   2. No regional wall motion abnormalities.   3. Spectral Doppler shows a Grade I (impaired relaxation pattern) of left ventricular diastolic filling with normal left atrial filling pressure.   4. Trivial tricuspid regurgitation with estimated RVSP 21 mmHg.   5. No previous available for comparison.    QUANTITATIVE DATA SUMMARY:     2D MEASUREMENTS:             Normal Ranges:  Ao Root d:       3.31 cm     (2.0-3.7cm)  LAs:             4.30 cm     (2.7-4.0cm)  IVSd:            1.09 cm     (0.6-1.1cm)  LVPWd:           1.05 cm     (0.6-1.1cm)  LVIDd:           4.75 cm     (3.9-5.9cm)  LVIDs:           3.25 cm  LV Mass Index:   96.1 g/m2  LVEDV Index:     63.77 ml/m2  LV % FS          31.6 %       LEFT ATRIUM:                  Normal Ranges:  LA Vol A4C:        60.9 ml    (22+/-6mL/m2)  LA Vol A2C:        48.1 ml  LA Vol BP:         54.2 ml  LA Vol Index A4C:  31.9ml/m2  LA Vol Index A2C:  25.2 ml/m2  LA Vol Index BP:   28.4 ml/m2  LA Area A4C:       21.0 cm2  LA Area A2C:       18.6 cm2  LA Major Axis A4C: 6.2 cm  LA Major Axis A2C: 6.1 cm  LA Volume Index:   27.3 ml/m2       RIGHT ATRIUM:                 Normal Ranges:  RA Vol A4C:        40.5 ml    (8.3-19.5ml)  RA Vol Index A4C:  21.2 ml/m2  RA Area A4C:       16.9  cm2  RA Major Axis A4C: 6.0 cm       AORTA MEASUREMENTS:         Normal Ranges:  Asc Ao, d:          3.22 cm (2.1-3.4cm)       LV SYSTOLIC FUNCTION:                       Normal Ranges:  EF-A4C View:    56 % (>=55%)  EF-A2C View:    55 %  EF-Biplane:     53 %  EF-Visual:      53 %  LV EF Reported: 53 %       LV DIASTOLIC FUNCTION:           Normal Ranges:  MV Peak E:             0.76 m/s  (0.7-1.2 m/s)  MV Peak A:             1.15 m/s  (0.42-0.7 m/s)  E/A Ratio:             0.66      (1.0-2.2)  MV e'                  0.085 m/s (>8.0)  MV lateral e'          0.10 m/s  MV medial e'           0.07 m/s  E/e' Ratio:            8.96      (<8.0)       MITRAL VALVE:          Normal Ranges:  MV DT:        170 msec (150-240msec)       AORTIC VALVE:                     Normal Ranges:  AoV Vmax:                1.28 m/s (<=1.7m/s)  AoV Peak P.6 mmHg (<20mmHg)  AoV Mean P.0 mmHg (1.7-11.5mmHg)  LVOT Max Dinh:            1.05 m/s (<=1.1m/s)  AoV VTI:                 24.90 cm (18-25cm)  LVOT VTI:                20.30 cm  LVOT Diameter:           2.18 cm  (1.8-2.4cm)  AoV Area, VTI:           3.04 cm2 (2.5-5.5cm2)  AoV Area,Vmax:           3.06 cm2 (2.5-4.5cm2)  AoV Dimensionless Index: 0.82       RIGHT VENTRICLE:  RV Basal 3.39 cm  RV Mid   2.69 cm  RV Major 7.4 cm  TAPSE:   17.8 mm  RV s'    0.15 m/s       TRICUSPID VALVE/RVSP:          Normal Ranges:  Peak TR Velocity:     2.13 m/s  RV Syst Pressure:     21 mmHg  (< 30mmHg)  IVC Diam:             1.58 cm       PULMONIC VALVE:          Normal Ranges:  PV Accel Time:  92 msec  (>120ms)  PV Max Dinh:     1.2 m/s  (0.6-0.9m/s)  PV Max P.4 mmHg       [id number] Aj Patino MD, FACC  Electronically signed on 3/20/2025 at 10:42:58 AM       ** Final **

## 2025-03-22 NOTE — CARE PLAN
The patient's goals for the shift include  to go home    The clinical goals for the shift include Patient will remain hemodynamically stable    Over the shift, the patient did not make progress toward the following goals. Barriers to progression include waiting for ID to determine IV or PO antibiotic needs for home. Recommendations to address these barriers include wait for ID to round.

## 2025-03-23 ENCOUNTER — APPOINTMENT (OUTPATIENT)
Dept: RADIOLOGY | Facility: HOSPITAL | Age: 70
DRG: 871 | End: 2025-03-23
Payer: MEDICARE

## 2025-03-23 VITALS
RESPIRATION RATE: 18 BRPM | WEIGHT: 211 LBS | SYSTOLIC BLOOD PRESSURE: 154 MMHG | TEMPERATURE: 98.1 F | BODY MASS INDEX: 41.43 KG/M2 | DIASTOLIC BLOOD PRESSURE: 72 MMHG | OXYGEN SATURATION: 92 % | HEIGHT: 60 IN | HEART RATE: 76 BPM

## 2025-03-23 LAB
ALBUMIN SERPL BCP-MCNC: 2.7 G/DL (ref 3.4–5)
ALP SERPL-CCNC: 281 U/L (ref 33–136)
ALT SERPL W P-5'-P-CCNC: 54 U/L (ref 7–45)
ANION GAP SERPL CALC-SCNC: 11 MMOL/L (ref 10–20)
AST SERPL W P-5'-P-CCNC: 46 U/L (ref 9–39)
BACTERIA BLD CULT: NORMAL
BACTERIA BLD CULT: NORMAL
BILIRUB SERPL-MCNC: 0.5 MG/DL (ref 0–1.2)
BUN SERPL-MCNC: 17 MG/DL (ref 6–23)
CALCIUM SERPL-MCNC: 8.2 MG/DL (ref 8.6–10.3)
CHLORIDE SERPL-SCNC: 105 MMOL/L (ref 98–107)
CO2 SERPL-SCNC: 26 MMOL/L (ref 21–32)
CREAT SERPL-MCNC: 0.94 MG/DL (ref 0.5–1.05)
EGFRCR SERPLBLD CKD-EPI 2021: 65 ML/MIN/1.73M*2
GLUCOSE SERPL-MCNC: 110 MG/DL (ref 74–99)
HOLD SPECIMEN: NORMAL
POTASSIUM SERPL-SCNC: 3 MMOL/L (ref 3.5–5.3)
PROT SERPL-MCNC: 6.2 G/DL (ref 6.4–8.2)
SODIUM SERPL-SCNC: 139 MMOL/L (ref 136–145)

## 2025-03-23 PROCEDURE — 2500000001 HC RX 250 WO HCPCS SELF ADMINISTERED DRUGS (ALT 637 FOR MEDICARE OP): Performed by: NURSE PRACTITIONER

## 2025-03-23 PROCEDURE — 2500000002 HC RX 250 W HCPCS SELF ADMINISTERED DRUGS (ALT 637 FOR MEDICARE OP, ALT 636 FOR OP/ED): Performed by: INTERNAL MEDICINE

## 2025-03-23 PROCEDURE — 2500000001 HC RX 250 WO HCPCS SELF ADMINISTERED DRUGS (ALT 637 FOR MEDICARE OP): Performed by: INTERNAL MEDICINE

## 2025-03-23 PROCEDURE — 94640 AIRWAY INHALATION TREATMENT: CPT

## 2025-03-23 PROCEDURE — 2500000004 HC RX 250 GENERAL PHARMACY W/ HCPCS (ALT 636 FOR OP/ED): Performed by: NURSE PRACTITIONER

## 2025-03-23 PROCEDURE — 2550000001 HC RX 255 CONTRASTS: Performed by: INTERNAL MEDICINE

## 2025-03-23 PROCEDURE — 74177 CT ABD & PELVIS W/CONTRAST: CPT

## 2025-03-23 PROCEDURE — 2500000004 HC RX 250 GENERAL PHARMACY W/ HCPCS (ALT 636 FOR OP/ED): Performed by: INTERNAL MEDICINE

## 2025-03-23 PROCEDURE — 74177 CT ABD & PELVIS W/CONTRAST: CPT | Performed by: RADIOLOGY

## 2025-03-23 PROCEDURE — 99232 SBSQ HOSP IP/OBS MODERATE 35: CPT | Performed by: INTERNAL MEDICINE

## 2025-03-23 PROCEDURE — 2060000001 HC INTERMEDIATE ICU ROOM DAILY

## 2025-03-23 PROCEDURE — 84075 ASSAY ALKALINE PHOSPHATASE: CPT | Performed by: INTERNAL MEDICINE

## 2025-03-23 PROCEDURE — 36415 COLL VENOUS BLD VENIPUNCTURE: CPT | Performed by: INTERNAL MEDICINE

## 2025-03-23 RX ORDER — CLOTRIMAZOLE 1 %
CREAM (GRAM) TOPICAL 2 TIMES DAILY
Status: DISCONTINUED | OUTPATIENT
Start: 2025-03-23 | End: 2025-03-23

## 2025-03-23 RX ORDER — NYSTATIN 100000 U/G
CREAM TOPICAL 2 TIMES DAILY
Status: DISCONTINUED | OUTPATIENT
Start: 2025-03-23 | End: 2025-03-26 | Stop reason: HOSPADM

## 2025-03-23 RX ORDER — POTASSIUM CHLORIDE 20 MEQ/1
40 TABLET, EXTENDED RELEASE ORAL ONCE
Status: COMPLETED | OUTPATIENT
Start: 2025-03-23 | End: 2025-03-23

## 2025-03-23 RX ADMIN — Medication 5 MG: at 22:45

## 2025-03-23 RX ADMIN — HEPARIN SODIUM 7500 UNITS: 5000 INJECTION INTRAVENOUS; SUBCUTANEOUS at 05:15

## 2025-03-23 RX ADMIN — ACETAMINOPHEN 650 MG: 325 TABLET ORAL at 22:45

## 2025-03-23 RX ADMIN — IPRATROPIUM BROMIDE AND ALBUTEROL SULFATE 3 ML: 2.5; .5 SOLUTION RESPIRATORY (INHALATION) at 14:10

## 2025-03-23 RX ADMIN — POTASSIUM CHLORIDE 40 MEQ: 1500 TABLET, EXTENDED RELEASE ORAL at 08:40

## 2025-03-23 RX ADMIN — GUAIFENESIN 600 MG: 600 TABLET, EXTENDED RELEASE ORAL at 17:25

## 2025-03-23 RX ADMIN — ACETAMINOPHEN 650 MG: 325 TABLET ORAL at 16:40

## 2025-03-23 RX ADMIN — NYSTATIN: 100000 CREAM TOPICAL at 17:22

## 2025-03-23 RX ADMIN — CEFTRIAXONE SODIUM 2 G: 2 INJECTION, POWDER, FOR SOLUTION INTRAMUSCULAR; INTRAVENOUS at 10:36

## 2025-03-23 RX ADMIN — METOPROLOL SUCCINATE 50 MG: 50 TABLET, EXTENDED RELEASE ORAL at 08:40

## 2025-03-23 RX ADMIN — HEPARIN SODIUM 7500 UNITS: 5000 INJECTION INTRAVENOUS; SUBCUTANEOUS at 23:25

## 2025-03-23 RX ADMIN — IOHEXOL 75 ML: 350 INJECTION, SOLUTION INTRAVENOUS at 13:41

## 2025-03-23 RX ADMIN — ACETAMINOPHEN 650 MG: 325 TABLET ORAL at 05:50

## 2025-03-23 RX ADMIN — CEFTRIAXONE SODIUM 2 G: 2 INJECTION, POWDER, FOR SOLUTION INTRAMUSCULAR; INTRAVENOUS at 23:26

## 2025-03-23 RX ADMIN — ASPIRIN 81 MG: 81 TABLET, COATED ORAL at 08:40

## 2025-03-23 RX ADMIN — PANTOPRAZOLE SODIUM 40 MG: 40 TABLET, DELAYED RELEASE ORAL at 05:15

## 2025-03-23 RX ADMIN — HEPARIN SODIUM 7500 UNITS: 5000 INJECTION INTRAVENOUS; SUBCUTANEOUS at 14:00

## 2025-03-23 ASSESSMENT — PAIN - FUNCTIONAL ASSESSMENT
PAIN_FUNCTIONAL_ASSESSMENT: 0-10

## 2025-03-23 ASSESSMENT — PAIN SCALES - GENERAL
PAINLEVEL_OUTOF10: 2
PAINLEVEL_OUTOF10: 0 - NO PAIN
PAINLEVEL_OUTOF10: 1
PAINLEVEL_OUTOF10: 1
PAINLEVEL_OUTOF10: 3

## 2025-03-23 ASSESSMENT — PAIN DESCRIPTION - LOCATION
LOCATION: BACK
LOCATION: HEAD

## 2025-03-23 NOTE — PROGRESS NOTES
Medical Group Progress Note  ASSESSMENT & PLAN:     Sepsis with metabolic encephalopathy and elevated LFTs  Proteus Mirabalis bacteremia  -source either UTI, given headache consideration for meningitis as well  -for now cont abx to cover meningitis including vanc, rocephin, ampicillin  -ID consulted  -tentative plan for LP with IR tomorrow mornign  -follow cultures      Intractable headache - resolved  Hx: migraine  Hx: anxiety  - CTA head neck showing developmental abnormality without evidence of infarction or dissection  - consult Neuro   - regular diet   - pain control  - telemetry overnight     HEDY  Hypokalemia    - Baseline SCr 0.85, on admission SCr 1.61, GFR 34  - Replace and recheck electrolytes     Transaminitis  - No complaints of abdominal pain monitor and trend,      - resume home meds once rec complete and clinically appropriate      VTE Prophylaxis: Heparin subcu    3/23/25  Continued clinical improvement  WBC downtrending  Renal function back to baseline  CT a/p today to evaluate liver given LFTs; no abscess or other liver abnormality but large proximal ureteral stone with mild hydro as well as calyceal calcifications  Will need urology to see, consult placed  Cont IV abx for now        Glen Felton MD    SUBJECTIVE     No overnight events. No new complaints. No pain.     OBJECTIVE:     Last Recorded Vitals:  Vitals:    03/23/25 0205 03/23/25 0539 03/23/25 0738 03/23/25 1100   BP: 116/58 164/74 139/68 138/75   BP Location:   Right arm Right arm   Patient Position:   Lying Sitting   Pulse: 75 70 69 74   Resp:   14 18   Temp: 37 °C (98.6 °F) 36.6 °C (97.9 °F) 36 °C (96.8 °F) 36.2 °C (97.2 °F)   TempSrc:   Temporal Temporal   SpO2: 94% 95% 94% 94%   Weight:       Height:         Last I/O:  I/O last 3 completed shifts:  In: 1100 (11.5 mL/kg) [P.O.:1100]  Out: - (0 mL/kg)   Weight: 95.7 kg     Physical Exam  GEN: comfortable, appears stated age  HEENT: NCAT, PERRLA, EOMI, moist mucous  membranes  NECK: supple, no masses  CV: RRR, no m/r/g, no LE edema  LUNGS: CTAB, no w/r/c  ABD: soft, NT, ND, NBS  SKIN: no rashes  MSK; no gross deformities, normal joints  NEURO: A+Ox3, no FND  PSYCH: appropriate mood, affect      Inpatient Medications:  aspirin, 81 mg, oral, Daily  [Held by provider] atorvastatin, 40 mg, oral, Daily  cefTRIAXone, 2 g, intravenous, q12h  heparin (porcine), 7,500 Units, subcutaneous, q8h ALVIN  melatonin, 5 mg, oral, Daily  metoprolol succinate XL, 50 mg, oral, Daily  pantoprazole, 40 mg, oral, Daily    PRN Medications  PRN medications: acetaminophen **OR** acetaminophen **OR** acetaminophen, acetaminophen, benzocaine-menthol, benzonatate, guaiFENesin, ipratropium-albuteroL, lidocaine, ondansetron **OR** ondansetron  Continuous Medications:       LABS AND IMAGING:     Labs:  Results from last 7 days   Lab Units 03/22/25  0649 03/21/25  0627 03/20/25  0553   WBC AUTO x10*3/uL 11.9* 16.1* 24.2*   RBC AUTO x10*6/uL 3.04* 3.39* 3.31*   HEMOGLOBIN g/dL 9.3* 10.3* 10.2*   HEMATOCRIT % 26.8* 29.9* 29.2*   MCV fL 88 88 88   MCH pg 30.6 30.4 30.8   MCHC g/dL 34.7 34.4 34.9   RDW % 15.7* 15.6* 15.4*   PLATELETS AUTO x10*3/uL 277 288 245     Results from last 7 days   Lab Units 03/23/25  0442 03/22/25  0650 03/21/25  0627   SODIUM mmol/L 139 137 137   POTASSIUM mmol/L 3.0* 4.8 3.9   CHLORIDE mmol/L 105 105 107   CO2 mmol/L 26 24 21   BUN mg/dL 17 16 19   CREATININE mg/dL 0.94 0.98 1.21*   GLUCOSE mg/dL 110* 117* 110*   PROTEIN TOTAL g/dL 6.2* 6.2* 6.5   CALCIUM mg/dL 8.2* 8.0* 8.3*   BILIRUBIN TOTAL mg/dL 0.5 0.5 0.5   ALK PHOS U/L 281* 232* 243*   AST U/L 46* 53* 56*   ALT U/L 54* 52* 53*     Results from last 7 days   Lab Units 03/20/25  0553 03/19/25  0602 03/18/25  0546   MAGNESIUM mg/dL 2.04 1.89 1.39*     Results from last 7 days   Lab Units 03/19/25  1441 03/19/25  0602   TROPHS ng/L 79* 105*     Imaging:  CT abdomen pelvis w IV contrast  Narrative: Interpreted By:  Bebo Negrete,    STUDY:  CT ABDOMEN PELVIS W IV CONTRAST;  3/23/2025 1:47 pm      INDICATION:  Signs/Symptoms:proteus bacteremia, persistent elevation LFTs, r/o  liver involvement.      COMPARISON:  03/18/2025 right upper quadrant abdominal ultrasound. No prior  abdominal or pelvic CT.      ACCESSION NUMBER(S):  [id number]      ORDERING CLINICIAN:  SORAIDA STERN      TECHNIQUE:  CT of the abdomen and pelvis was performed.  Standard contiguous  axial images were obtained at 3 mm slice thickness through the  abdomen and pelvis. Coronal and sagittal reconstructions at 3 mm  slice thickness were performed.  75 ML of Omnipaque 350 was  administered intravenously without immediate complication.      FINDINGS:  LOWER CHEST:  Mild right lower lobe subsegmental atelectasis or scarring.      ABDOMEN:      LIVER:  The liver is normal in size without evidence of focal lesions.      BILE DUCTS:  The intrahepatic and extrahepatic ducts are not dilated.      GALLBLADDER:  Cholecystectomy clips.      PANCREAS:  Within normal limits.      SPLEEN:  Normal size and homogeneous.      ADRENAL GLANDS:  Within normal limits.      KIDNEYS AND URETERS:  10 mm axial diameter and 18 mm tall very proximal (superior) left  ureteral calcification at the L3 level. Mild left hydronephrosis  without perinephric stranding. Multiple calyceal and infundibular  lower left renal calcifications measure up to almost 1 cm. Normal  size kidneys without a mass or right hydroureteronephrosis.      PELVIS:      BLADDER:  Collapsed precluding evaluation for mucosal lesions. No calcified  stone.      REPRODUCTIVE ORGANS:  Status post hysterectomy. The ovaries can not be identified.      BOWEL:  The stomach is unremarkable.   Moderate amount of stool without bowel  distention. Almost gasless small bowel. Normal retrocecal appendix.      VESSELS:  The aorta and IVC appear normal. Minimal vascular calcification.      PERITONEUM/RETROPERITONEUM/LYMPH NODES:  No ascites, free  air or fluid collection.  No abdominopelvic  lymphadenopathy.      BONES AND ABDOMINAL WALL:  Marked, extensive spinal degenerative changes include very severe  bilateral hypertrophic L4-5 facet joint DJD accounting for minimal  spondylolisthesis at this level. Slight scoliosis. Bilateral  sacroiliac joint DJD. Diastasis recti.      Impression: 1. 10 x 10 x 18 mm proximal left ureteral calculus with mild left  hydronephrosis.  2. Left renal calculi.      MACRO:  None      Signed by: Bebo Negrete 3/23/2025 3:11 PM  Dictation workstation:   [id number]

## 2025-03-23 NOTE — CARE PLAN
The clinical goals for the shift include Patient will remain free from additional injury for the remainder of the shift.    Over the shift, the patient did make progress toward the following goals.     Problem: Pain - Adult  Goal: Verbalizes/displays adequate comfort level or baseline comfort level  3/23/2025 1149 by Nubia Small RN  Outcome: Progressing  3/23/2025 1148 by Nubia Small RN  Outcome: Progressing     Problem: Safety - Adult  Goal: Free from fall injury  3/23/2025 1149 by Nubia Small RN  Outcome: Progressing  3/23/2025 1148 by Nubia Small RN  Outcome: Progressing     Problem: Discharge Planning  Goal: Discharge to home or other facility with appropriate resources  3/23/2025 1149 by Nubia Small RN  Outcome: Progressing  3/23/2025 1148 by Nubia Small RN  Outcome: Progressing     Problem: Chronic Conditions and Co-morbidities  Goal: Patient's chronic conditions and co-morbidity symptoms are monitored and maintained or improved  3/23/2025 1149 by Nubia Small RN  Outcome: Progressing  3/23/2025 1148 by Nubia Small RN  Outcome: Progressing     Problem: Nutrition  Goal: Nutrient intake appropriate for maintaining nutritional needs  3/23/2025 1149 by Nubia Small RN  Outcome: Progressing  3/23/2025 1148 by Nubia Small RN  Outcome: Progressing     Problem: Skin  Goal: Prevent/manage excess moisture  3/23/2025 1149 by Nubia Small RN  Outcome: Progressing  3/23/2025 1148 by Nubia Small RN  Outcome: Progressing

## 2025-03-24 ENCOUNTER — APPOINTMENT (OUTPATIENT)
Dept: PRIMARY CARE | Facility: CLINIC | Age: 70
End: 2025-03-24
Payer: MEDICARE

## 2025-03-24 ENCOUNTER — APPOINTMENT (OUTPATIENT)
Dept: RADIOLOGY | Facility: HOSPITAL | Age: 70
DRG: 871 | End: 2025-03-24
Payer: MEDICARE

## 2025-03-24 PROCEDURE — 99232 SBSQ HOSP IP/OBS MODERATE 35: CPT | Performed by: INTERNAL MEDICINE

## 2025-03-24 PROCEDURE — C1894 INTRO/SHEATH, NON-LASER: HCPCS

## 2025-03-24 PROCEDURE — C1729 CATH, DRAINAGE: HCPCS

## 2025-03-24 PROCEDURE — C1769 GUIDE WIRE: HCPCS

## 2025-03-24 PROCEDURE — 0T9430Z DRAINAGE OF LEFT KIDNEY PELVIS WITH DRAINAGE DEVICE, PERCUTANEOUS APPROACH: ICD-10-PCS | Performed by: RADIOLOGY

## 2025-03-24 PROCEDURE — 36251 INS CATH REN ART 1ST UNILAT: CPT | Mod: LT | Performed by: RADIOLOGY

## 2025-03-24 PROCEDURE — 2500000001 HC RX 250 WO HCPCS SELF ADMINISTERED DRUGS (ALT 637 FOR MEDICARE OP): Performed by: NURSE PRACTITIONER

## 2025-03-24 PROCEDURE — 87075 CULTR BACTERIA EXCEPT BLOOD: CPT | Mod: ELYLAB | Performed by: UROLOGY

## 2025-03-24 PROCEDURE — 2060000001 HC INTERMEDIATE ICU ROOM DAILY

## 2025-03-24 PROCEDURE — 50432 PLMT NEPHROSTOMY CATHETER: CPT | Performed by: RADIOLOGY

## 2025-03-24 PROCEDURE — 2720000007 HC OR 272 NO HCPCS

## 2025-03-24 PROCEDURE — 2500000004 HC RX 250 GENERAL PHARMACY W/ HCPCS (ALT 636 FOR OP/ED): Performed by: RADIOLOGY

## 2025-03-24 PROCEDURE — 2500000004 HC RX 250 GENERAL PHARMACY W/ HCPCS (ALT 636 FOR OP/ED): Performed by: INTERNAL MEDICINE

## 2025-03-24 PROCEDURE — 2500000001 HC RX 250 WO HCPCS SELF ADMINISTERED DRUGS (ALT 637 FOR MEDICARE OP): Performed by: INTERNAL MEDICINE

## 2025-03-24 PROCEDURE — 76937 US GUIDE VASCULAR ACCESS: CPT | Performed by: RADIOLOGY

## 2025-03-24 PROCEDURE — 2550000001 HC RX 255 CONTRASTS: Performed by: INTERNAL MEDICINE

## 2025-03-24 PROCEDURE — 99222 1ST HOSP IP/OBS MODERATE 55: CPT

## 2025-03-24 RX ORDER — LIDOCAINE HYDROCHLORIDE 20 MG/ML
INJECTION, SOLUTION EPIDURAL; INFILTRATION; INTRACAUDAL; PERINEURAL
Status: COMPLETED | OUTPATIENT
Start: 2025-03-24 | End: 2025-03-24

## 2025-03-24 RX ORDER — OXYCODONE HYDROCHLORIDE 5 MG/1
5 TABLET ORAL EVERY 4 HOURS PRN
Status: DISCONTINUED | OUTPATIENT
Start: 2025-03-24 | End: 2025-03-26 | Stop reason: HOSPADM

## 2025-03-24 RX ADMIN — LIDOCAINE HYDROCHLORIDE 7 ML: 20 INJECTION, SOLUTION EPIDURAL; INFILTRATION; INTRACAUDAL; PERINEURAL at 13:19

## 2025-03-24 RX ADMIN — CEFTRIAXONE SODIUM 2 G: 2 INJECTION, POWDER, FOR SOLUTION INTRAMUSCULAR; INTRAVENOUS at 22:05

## 2025-03-24 RX ADMIN — HEPARIN SODIUM 7500 UNITS: 5000 INJECTION INTRAVENOUS; SUBCUTANEOUS at 05:57

## 2025-03-24 RX ADMIN — ASPIRIN 81 MG: 81 TABLET, COATED ORAL at 08:44

## 2025-03-24 RX ADMIN — HEPARIN SODIUM 7500 UNITS: 5000 INJECTION INTRAVENOUS; SUBCUTANEOUS at 21:28

## 2025-03-24 RX ADMIN — IOHEXOL 10 ML: 300 INJECTION, SOLUTION INTRAVENOUS at 14:16

## 2025-03-24 RX ADMIN — PANTOPRAZOLE SODIUM 40 MG: 40 TABLET, DELAYED RELEASE ORAL at 05:58

## 2025-03-24 RX ADMIN — NYSTATIN: 100000 CREAM TOPICAL at 05:58

## 2025-03-24 RX ADMIN — NYSTATIN: 100000 CREAM TOPICAL at 17:13

## 2025-03-24 RX ADMIN — ACETAMINOPHEN 650 MG: 325 TABLET ORAL at 08:45

## 2025-03-24 RX ADMIN — CEFTRIAXONE SODIUM 2 G: 2 INJECTION, POWDER, FOR SOLUTION INTRAMUSCULAR; INTRAVENOUS at 10:43

## 2025-03-24 RX ADMIN — OXYCODONE 5 MG: 5 TABLET ORAL at 22:05

## 2025-03-24 RX ADMIN — OXYCODONE 5 MG: 5 TABLET ORAL at 18:31

## 2025-03-24 RX ADMIN — ACETAMINOPHEN 650 MG: 325 TABLET ORAL at 17:14

## 2025-03-24 RX ADMIN — METOPROLOL SUCCINATE 50 MG: 50 TABLET, EXTENDED RELEASE ORAL at 08:44

## 2025-03-24 RX ADMIN — Medication 5 MG: at 20:46

## 2025-03-24 RX ADMIN — BENZONATATE 100 MG: 100 CAPSULE ORAL at 08:54

## 2025-03-24 ASSESSMENT — COGNITIVE AND FUNCTIONAL STATUS - GENERAL
MOVING FROM LYING ON BACK TO SITTING ON SIDE OF FLAT BED WITH BEDRAILS: A LITTLE
MOVING TO AND FROM BED TO CHAIR: A LITTLE
TURNING FROM BACK TO SIDE WHILE IN FLAT BAD: A LITTLE
MOBILITY SCORE: 18
STANDING UP FROM CHAIR USING ARMS: A LITTLE
WALKING IN HOSPITAL ROOM: A LITTLE
CLIMB 3 TO 5 STEPS WITH RAILING: A LITTLE
DRESSING REGULAR UPPER BODY CLOTHING: A LITTLE
DAILY ACTIVITIY SCORE: 23

## 2025-03-24 ASSESSMENT — PAIN - FUNCTIONAL ASSESSMENT
PAIN_FUNCTIONAL_ASSESSMENT: 0-10

## 2025-03-24 ASSESSMENT — PAIN SCALES - GENERAL
PAINLEVEL_OUTOF10: 3
PAINLEVEL_OUTOF10: 0 - NO PAIN
PAINLEVEL_OUTOF10: 0 - NO PAIN
PAINLEVEL_OUTOF10: 3
PAINLEVEL_OUTOF10: 3
PAINLEVEL_OUTOF10: 7
PAINLEVEL_OUTOF10: 6
PAINLEVEL_OUTOF10: 2
PAINLEVEL_OUTOF10: 0 - NO PAIN

## 2025-03-24 ASSESSMENT — ENCOUNTER SYMPTOMS
FLANK PAIN: 0
ABDOMINAL PAIN: 0
NAUSEA: 0
HEMATURIA: 0
FEVER: 0
VOMITING: 0
DYSURIA: 0
DIFFICULTY URINATING: 0
CHILLS: 0
FREQUENCY: 1

## 2025-03-24 ASSESSMENT — PAIN DESCRIPTION - DESCRIPTORS: DESCRIPTORS: ACHING

## 2025-03-24 NOTE — POST-PROCEDURE NOTE
Interventional Radiology Brief Postprocedure Note    Attending: Schoenberger    Assistant: None    Diagnosis: Left Ureter Stone. UTI with Bacteremia    Description of procedure: US Flouro Guided Left Percutaneous Nephrostomy     Anesthesia:  Local    Complications: None    Estimated Blood Loss: minimal    Medications  As of 03/24/25 1332      pantoprazole (ProtoNix) EC tablet 40 mg (mg) Total dose:  280 mg Dosing weight:  95.7      Date/Time Rate/Dose/Volume Action       03/18/25  0557 40 mg Given     03/19/25  0542 40 mg Given     03/20/25  0652 40 mg Given     03/21/25  0601 40 mg Given     03/22/25  0627 40 mg Given     03/23/25  0515 40 mg Given     03/24/25  0558 40 mg Given               pantoprazole (ProtoNix) injection 40 mg (mg) Total dose:  Cannot be calculated* Dosing weight:  95.7   *Administration dose not documented     Date/Time Rate/Dose/Volume Action       03/18/25  0557 *Not included in total See Alternative     03/19/25  0542 *Not included in total See Alternative     03/20/25  0652 *Not included in total See Alternative     03/21/25  0601 *Not included in total See Alternative     03/22/25  0627 *Not included in total See Alternative     03/23/25  0515 *Not included in total See Alternative     03/24/25  0558 *Not included in total See Alternative               acetaminophen (Tylenol) tablet 650 mg (mg) Total dose:  5,850 mg Dosing weight:  95.7      Date/Time Rate/Dose/Volume Action       03/18/25  Canceled Entry      0734 650 mg Given      2056 650 mg Given     03/19/25  1626 650 mg Given     03/22/25  0904 650 mg Given      2217 650 mg Given     03/23/25  0550 650 mg Given      1640 650 mg Given      2245 650 mg Given     03/24/25  0845 650 mg Given               acetaminophen (Tylenol) oral liquid 650 mg (mg) Total dose:  Cannot be calculated* Dosing weight:  95.7   *Administration dose not documented     Date/Time Rate/Dose/Volume Action       03/18/25 2056 *Not included in total See  Alternative     03/19/25  1626 *Not included in total See Alternative     03/22/25  0904 *Not included in total See Alternative      2217 *Not included in total See Alternative     03/23/25  0550 *Not included in total See Alternative      1640 *Not included in total See Alternative      2245 *Not included in total See Alternative     03/24/25  0845 *Not included in total See Alternative               acetaminophen (Tylenol) suppository 650 mg (mg) Total dose:  Cannot be calculated* Dosing weight:  95.7   *Administration dose not documented     Date/Time Rate/Dose/Volume Action       03/18/25  2056 *Not included in total See Alternative     03/19/25  1626 *Not included in total See Alternative     03/22/25  0904 *Not included in total See Alternative      2217 *Not included in total See Alternative     03/23/25  0550 *Not included in total See Alternative      1640 *Not included in total See Alternative      2245 *Not included in total See Alternative     03/24/25  0845 *Not included in total See Alternative               benzocaine-menthol (Cepastat Sore Throat) lozenge 1 lozenge (lozenge) Total dose:  5 lozenge Dosing weight:  95.7      Date/Time Rate/Dose/Volume Action       03/19/25  1028 1 lozenge Given      2207 1 lozenge Given     03/20/25  0127 1 lozenge Given      1544 1 lozenge Given     03/22/25  2019 1 lozenge Given               guaiFENesin (Mucinex) 12 hr tablet 600 mg (mg) Total dose:  2,400 mg Dosing weight:  95.7      Date/Time Rate/Dose/Volume Action       03/19/25  2207 600 mg Given     03/21/25  2238 600 mg Given     03/22/25  0904 600 mg Given     03/23/25  1725 600 mg Given               heparin (porcine) injection 7,500 Units (Units) Total dose:  120,000 Units* Dosing weight:  95.7   *Administration not included in total     Date/Time Rate/Dose/Volume Action       03/18/25  0557 7,500 Units Given      0647 *Not included in total Held by provider      1400 *7,500 Units Missed      2200 *7,500  Units Missed     03/19/25  0600 *7,500 Units Missed      1258 *Not included in total Unheld by provider      1626 7,500 Units Given      2206 7,500 Units Given     03/20/25  0652 7,500 Units Given      1536 7,500 Units Given      2215 7,500 Units Given     03/21/25  0601 7,500 Units Given      1414 7,500 Units Given      2219 7,500 Units Given     03/22/25  0626 7,500 Units Given      1446 7,500 Units Given      2215 7,500 Units Given     03/23/25  0515 7,500 Units Given      1400 7,500 Units Given      2325 7,500 Units Given     03/24/25  0557 7,500 Units Given               acetaminophen (Ofirmev) injection 1,000 mg (mL/hr) Total dose:  1,000 mg* Dosing weight:  95.7   *From user-documented volume     Date/Time Rate/Dose/Volume Action       03/17/25  2311 1,000 mg - 400 mL/hr (over 15 min) New Bag      2332 100 mL Stopped               cefTRIAXone (Rocephin) 2 g in dextrose 5% IV 50 mL (mL/hr) Total dose:  6 g* Dosing weight:  95.7   *From user-documented volume     Date/Time Rate/Dose/Volume Action       03/17/25  2314 2 g - 100 mL/hr (over 30 min) New Bag      2348 50 mL Stopped     03/18/25  2201 2 g - 100 mL/hr (over 30 min) New Bag      2241  (over 30 min) Stopped     03/19/25  1327 2 g - 100 mL/hr (over 30 min) - 50 mL New Bag      1404  (over 30 min) Stopped     03/20/25  0127 2 g - 100 mL/hr (over 30 min) New Bag      0222  (over 30 min) Stopped      1159 2 g - 100 mL/hr (over 30 min) New Bag      1233  (over 30 min) Stopped      2216 2 g - 100 mL/hr (over 30 min) New Bag      2300  (over 30 min) Stopped     03/21/25  1124 2 g - 100 mL/hr (over 30 min) New Bag      1200  (over 30 min) Stopped      2219 2 g - 100 mL/hr (over 30 min) New Bag      2317  (over 30 min) Stopped     03/22/25  1109 2 g - 100 mL/hr (over 30 min) New Bag      1139  (over 30 min) Stopped      2211 2 g - 100 mL/hr (over 30 min) New Bag     03/23/25  1036 2 g - 100 mL/hr (over 30 min) New Bag      1113 50 mL Stopped      2326 2 g - 100  mL/hr (over 30 min) New Bag      2353  (over 30 min) Stopped     03/24/25  1043 2 g - 100 mL/hr (over 30 min) New Bag      1105  (over 30 min) Stopped               acyclovir (Zovirax) 230 mg in dextrose 5% 50 mL IV (mL/hr) Total volume:  Not documented* Dosing weight:  45.5   *Total volume has not been documented. View each administration to see the amount administered.     Date/Time Rate/Dose/Volume Action       03/17/25  2332 230 mg - 54.6 mL/hr (over 60 min) New Bag     03/18/25  0041  (over 60 min) Stopped               ampicillin (Omnipen) 2 g in sodium chloride 0.9%  mL (mL/hr) Total volume:  Not documented* Dosing weight:  95.7   *Total volume has not been documented. View each administration to see the amount administered.     Date/Time Rate/Dose/Volume Action       03/17/25  2358 2 g - 200 mL/hr (over 30 min) New Bag     03/18/25  0041  (over 30 min) Stopped      1142 2 g - 200 mL/hr (over 30 min) New Bag      1509  (over 30 min) Stopped      1826 2 g - 200 mL/hr (over 30 min) New Bag      1906  (over 30 min) Stopped     03/19/25  0121 2 g - 200 mL/hr (over 30 min) New Bag      0207  (over 30 min) Stopped      0908 2 g - 200 mL/hr (over 30 min) New Bag      1028  (over 30 min) Stopped               lactated Ringer's bolus 1,000 mL (mL/hr) Total volume:  591.67 mL* Dosing weight:  95.7   *From user-documented volume     Date/Time Rate/Dose/Volume Action       03/17/25  2317 1,000 mL - 500 mL/hr (over 120 min) New Bag     03/18/25  0028  (over 120 min) Stopped      0345 591.67 mL                lactated Ringer's bolus 710 mL (mL/hr) Total volume:  1,053.17 mL* Dosing weight:  95.7   *From user-documented volume     Date/Time Rate/Dose/Volume Action       03/17/25  2324 710 mL - 710 mL/hr (over 60 min) New Bag     03/18/25  0053  (over 60 min) Stopped      0345 1,053.17 mL                vancomycin (Vancocin) 2,000 mg in dextrose 5% 500 mL IV (mL/hr) Total volume:  Not documented* Dosing weight:  95.7    *Total volume has not been documented. View each administration to see the amount administered.     Date/Time Rate/Dose/Volume Action       03/17/25  2348 2,000 mg (over 120 min) New Bag     03/18/25  0052 250 mL/hr (over 120 min) Rate Verify               potassium chloride 20 mEq in sterile water for injection 100 mL (mL/hr) Total volume:  Not documented* Dosing weight:  95.7   *Total volume has not been documented. View each administration to see the amount administered.     Date/Time Rate/Dose/Volume Action       03/18/25  0725 20 mEq - 50 mL/hr (over 120 min) New Bag      1100 20 mEq - 50 mL/hr (over 120 min) New Bag      1116  (over 120 min) Stopped      1509  (over 120 min) Stopped               sodium chloride 0.9% infusion (mL/hr) Total volume:  2,022.5 mL* Dosing weight:  95.7   *From user-documented volume     Date/Time Rate/Dose/Volume Action       03/18/25  0902 75 mL/hr New Bag      1230 75 mL/hr - 260 mL Rate Verify      2117 75 mL/hr - 658.75 mL Rate Verify      2127 75 mL/hr - 12.5 mL Rate Verify      2151 75 mL/hr New Bag      2247 75 mL/hr - 100 mL Rate Verify      2255 75 mL/hr - 10 mL Rate Verify     03/19/25  0005 75 mL/hr - 87.5 mL Rate Verify      0023 75 mL/hr - 22.5 mL Rate Verify      0029 75 mL/hr - 7.5 mL Rate Verify      0044 75 mL/hr - 18.75 mL Rate Verify      0146 75 mL/hr - 77.5 mL Rate Verify      0222 75 mL/hr - 45 mL Rate Verify      0315 75 mL/hr - 66.25 mL Rate Verify      0512 75 mL/hr - 146.25 mL Rate Verify      0606 75 mL/hr - 67.5 mL Rate Verify      0608 75 mL/hr - 2.5 mL Rate Verify      0655 75 mL/hr - 58.75 mL Rate Verify      1100  Stopped      1155 375 mL       1200 6.25 mL                aspirin EC tablet 81 mg (mg) Total dose:  405 mg*   *Administration not included in total     Date/Time Rate/Dose/Volume Action       03/18/25  0915 *Not included in total Held by provider      0945 *81 mg Missed     03/19/25  0900 *81 mg Missed      1258 *Not included in total  Unheld by provider     03/20/25  0812 81 mg Given     03/21/25  0835 81 mg Given     03/22/25  0904 81 mg Given     03/23/25  0840 81 mg Given     03/24/25  0844 81 mg Given               atorvastatin (Lipitor) tablet 40 mg (mg) Total dose:  Cannot be calculated* Dosing weight:  95.7   *Administration dose not documented     Date/Time Rate/Dose/Volume Action       03/18/25 0915 *Not included in total Held by provider      0945 *40 mg Missed     03/19/25 0900 *40 mg Missed     03/20/25 0900 *40 mg Missed     03/21/25  0900 *40 mg Missed     03/22/25 0900 *40 mg Missed     03/23/25 0900 *40 mg Missed     03/24/25 0900 *40 mg Missed               metoprolol succinate XL (Toprol-XL) 24 hr tablet 50 mg (mg) Total dose:  150 mg*   *Administration not included in total     Date/Time Rate/Dose/Volume Action       03/18/25 0915 *Not included in total Held by provider      0945 *50 mg Missed     03/19/25 0900 *50 mg Missed     03/20/25 0900 *50 mg Missed     03/21/25 0900 *50 mg Missed      1359 *Not included in total Unheld by provider     03/22/25  0904 50 mg Given     03/23/25  0840 50 mg Given     03/24/25  0844 50 mg Given               vancomycin (Vancocin) 750 mg in sodium chloride 0.9%  mL (mL/hr) Total volume:  Not documented* Dosing weight:  95.7   *Total volume has not been documented. View each administration to see the amount administered.     Date/Time Rate/Dose/Volume Action       03/18/25  Canceled Entry     03/19/25  0121 750 mg - 333.3 mL/hr (over 45 min) New Bag      0221  (over 45 min) Stopped               vancomycin (Vancocin) 1,000 mg in dextrose 5%  mL (mL/hr) Total volume:  Not documented* Dosing weight:  95.7   *Total volume has not been documented. View each administration to see the amount administered.     Date/Time Rate/Dose/Volume Action       03/19/25  2206 1,000 mg - 200 mL/hr (over 60 min) New Bag      2319  (over 60 min) Stopped               perflutren lipid  microspheres (Definity) injection 0.5-10 mL of dilution (mL of dilution) Total dose:  2 mL of dilution Dosing weight:  95.7      Date/Time Rate/Dose/Volume Action       03/20/25  0935 2 mL of dilution Given               potassium chloride CR (Klor-Con M20) ER tablet 40 mEq (mEq) Total dose:  120 mEq Dosing weight:  95.7      Date/Time Rate/Dose/Volume Action       03/19/25  0908 40 mEq Given     03/20/25  1536 40 mEq Given     03/23/25  0840 40 mEq Given               lidocaine (Xylocaine) 20 mg/mL (2 %) injection 2 mL (mL) Total volume:  2 mL Dosing weight:  95.7      Date/Time Rate/Dose/Volume Action       03/19/25  1229 2 mL Given               benzonatate (Tessalon) capsule 100 mg (mg) Total dose:  600 mg Dosing weight:  95.7      Date/Time Rate/Dose/Volume Action       03/19/25  1626 100 mg Given     03/20/25  0127 100 mg Given      1544 100 mg Given     03/21/25  2238 100 mg Given     03/22/25  2019 100 mg Given     03/24/25  0854 100 mg Given               melatonin tablet 5 mg (mg) Total dose:  20 mg Dosing weight:  95.7      Date/Time Rate/Dose/Volume Action       03/20/25  2215 5 mg Given     03/21/25  2047 5 mg Given     03/22/25  2020 5 mg Given     03/23/25  2245 5 mg Given               ipratropium-albuteroL (Duo-Neb) 0.5-2.5 mg/3 mL nebulizer solution 3 mL (mL) Total volume:  12 mL Dosing weight:  95.7      Date/Time Rate/Dose/Volume Action       03/20/25  1752 3 mL Given      2245 3 mL Given     03/21/25  2246 3 mL Given     03/23/25  1410 3 mL Given               iohexol (OMNIPaque) 350 mg iodine/mL solution 75 mL (mL) Total volume:  75 mL Dosing weight:  95.7      Date/Time Rate/Dose/Volume Action       03/23/25  1341 75 mL Given               nystatin (Mycostatin) cream Total dose:  Cannot be calculated* Dosing weight:  95.7   *Administration does not have dose documented     Date/Time Rate/Dose/Volume Action       03/23/25  1722  Given     03/24/25  0558  Given                   Cloudy urine  from Left Renal Collecting System sent to lab      See detailed result report with images in PACS.    The patient tolerated the procedure well without incident or complication and is in stable condition.

## 2025-03-24 NOTE — PROGRESS NOTES
Infectious Disease Progress Note       3/23/2025    Patient is a followup regarding headache with sore throat, history of blisters in the mouth, bilateral ear pain, mild transaminitis, fevers which are now trending down, with consideration for meningitis        Patient had lumbar puncture, no evidence of meningitis.  Isolation discontinued.  C. difficile test was negative as well.  Diarrhea has somewhat come down.    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    1 out of 2 blood cultures from 3/17/2025 with gram-negative bacilli  Proteus  Lab Results   Component Value Date    WBC 11.9 (H) 03/22/2025    HGB 9.3 (L) 03/22/2025    HCT 26.8 (L) 03/22/2025    MCV 88 03/22/2025     03/22/2025     Lab Results   Component Value Date    GLUCOSE 110 (H) 03/23/2025    CALCIUM 8.2 (L) 03/23/2025     03/23/2025    K 3.0 (L) 03/23/2025    CO2 26 03/23/2025     03/23/2025    BUN 17 03/23/2025    CREATININE 0.94 03/23/2025       WBC trends are being monitored. Antibiotic doses are being adjusted per most recent renal labs.     Vitals:    03/23/25 2004   BP: 160/74   Pulse: 76   Resp: 20   Temp: 36.1 °C (97 °F)   SpO2: 93%     Obese patient no acute distress  No conversational dyspnea, no tachypnea, no cough  Neck supple  Heart S1-S2 but tachycardic at 102  Lungs bilaterally diminished but clear  Abdomen soft nondistended  Extremities no pain to palpation, no blisters  No oral blisters or evidence of thrush at the present time    Patient Active Problem List   Diagnosis    Abnormal weight gain    Acute non-recurrent maxillary sinusitis    Anxiety disorder    Atypical ductal hyperplasia of breast    Breast neoplasm, Tis (LCIS)    Dysfunction of right eustachian tube    Dyslipidemia    Hypertension    Migraine    Primary osteoarthritis of both knees    Vitamin D deficiency    Weight gain    Essential (primary) hypertension    Acute bacterial sinusitis     Medicare annual wellness visit, subsequent    Adjustment disorder with anxious mood    Carpal tunnel syndrome, right upper limb    Body mass index (BMI) 40.0-44.9, adult (Multi)    Hyperglycemia    HEDY (acute kidney injury) (CMS-[facility])    Headache           ASSESSMENT:  Bacteremia 1 out of 2 gram-negative bacilli on prelim culture from 3/17/2025 that popped positive today  Sepsis with history of headaches, sore throat, oral lesions, transaminitis, bilateral ear pain with possible exposure to hand-foot-and-mouth disease at grandchildren's  vs herpes stomatitis      PLAN:    No evidence of meningitis   Ceftriaxone right now     CT noted, discussed with medical team      Tigre Hernandez MD

## 2025-03-24 NOTE — PROGRESS NOTES
LP negative for Meningitis. Taken to IR for US guided left percutaneous nephrostomy placement. ID following, possible PO at dc.

## 2025-03-24 NOTE — PROGRESS NOTES
Infectious Disease Progress Note       3/24/2025    Patient is a followup regarding headache with sore throat, history of blisters in the mouth, bilateral ear pain, mild transaminitis, fevers which are now trending down, with consideration for meningitis        Patient had lumbar puncture, no evidence of meningitis.  Isolation discontinued.  C. difficile test was negative as well.  Diarrhea has somewhat come down.    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    1 out of 2 blood cultures from 3/17/2025 with gram-negative bacilli  Proteus    No fever  Lab Results   Component Value Date    WBC 11.9 (H) 03/22/2025    HGB 9.3 (L) 03/22/2025    HCT 26.8 (L) 03/22/2025    MCV 88 03/22/2025     03/22/2025     Lab Results   Component Value Date    GLUCOSE 110 (H) 03/23/2025    CALCIUM 8.2 (L) 03/23/2025     03/23/2025    K 3.0 (L) 03/23/2025    CO2 26 03/23/2025     03/23/2025    BUN 17 03/23/2025    CREATININE 0.94 03/23/2025       WBC trends are being monitored. Antibiotic doses are being adjusted per most recent renal labs.     Vitals:    03/24/25 1714   BP: 169/81   Pulse: 73   Resp: 19   Temp: 36.5 °C (97.7 °F)   SpO2: 98%     Obese patient no acute distress  No conversational dyspnea, no tachypnea, no cough  Neck supple  Heart S1-S2 but tachycardic at 102  Lungs bilaterally diminished but clear  Abdomen soft nondistended  Extremities no pain to palpation, no blisters  No oral blisters or evidence of thrush at the present time    Patient Active Problem List   Diagnosis    Abnormal weight gain    Acute non-recurrent maxillary sinusitis    Anxiety disorder    Atypical ductal hyperplasia of breast    Breast neoplasm, Tis (LCIS)    Dysfunction of right eustachian tube    Dyslipidemia    Hypertension    Migraine    Primary osteoarthritis of both knees    Vitamin D deficiency    Weight gain    Essential (primary) hypertension    Acute  bacterial sinusitis    Medicare annual wellness visit, subsequent    Adjustment disorder with anxious mood    Carpal tunnel syndrome, right upper limb    Body mass index (BMI) 40.0-44.9, adult (Multi)    Hyperglycemia    HEDY (acute kidney injury) (CMS-[facility])    Headache           ASSESSMENT:  Bacteremia 1 out of 2 gram-negative bacilli on prelim culture from 3/17/2025 that popped positive today  Sepsis with history of headaches, sore throat, oral lesions, transaminitis, bilateral ear pain with possible exposure to hand-foot-and-mouth disease at grandchildren's  vs herpes stomatitis      PLAN:    No evidence of meningitis   Ceftriaxone right now     CT noted, discussed with medical team    Urology involved, had pcnt   Await cultures      Tigre Hernandez MD

## 2025-03-24 NOTE — CONSULTS
Inpatient consult to Urology  Consult performed by: Gisselle Ayala PA-C  Consult ordered by: Glen Felton MD  Reason for consult: Obstructing ureteral calculus        History Of Present Illness  Beth Quinones is a 69 y.o. female with a PMH of HTN and HLD who presented [place] [facility] on 03/17/25 with intractable headache.  Patient was admitted for further evaluation and subsequently found to be septic.  She had an LP for evaluation of possible meningitis which was negative.  Blood culture positive for proteus mirabilis.  CT a/p ordered and a 10 x 10 x 18 mm proximal left ureteral calculus with mild left hydronephrosis was found along with other nonobstructing left renal calculi.  HEDY and leukocytosis have resolved.  Urine culture was negative.  Patient reports not feeling well today.  Reports back pain bilaterally from the bed but no flank pain.  Admits to urinary frequency at baseline.  She's never had kidney stones previously.     Past Medical History  Past Medical History:   Diagnosis Date    Acute maxillary sinusitis, unspecified 04/20/2022    Acute non-recurrent maxillary sinusitis    Acute non-recurrent maxillary sinusitis 02/13/2023    Anxiety     Arthritis     Chronic sinusitis, unspecified 12/16/2020    Sinobronchitis    High cholesterol     Hypertension     Pain, unspecified     Pain    Personal history of other specified conditions 05/27/2015    History of abnormal mammogram    Snores     Vertigo     Wears glasses     reading        Surgical History  Past Surgical History:   Procedure Laterality Date    BREAST BIOPSY Right     CHOLECYSTECTOMY  02/09/2015    COLONOSCOPY      ESOPHAGOGASTRODUODENOSCOPY      HYSTERECTOMY  02/09/2015    MOUTH SURGERY      tooth removed         Social History  Social Drivers of Health     Tobacco Use: Low Risk  (3/17/2025)    Patient History     Smoking Tobacco Use: Never     Smokeless Tobacco Use: Never     Passive Exposure: Past   Alcohol Use: Not At Risk (3/18/2025)     AUDIT-C     Frequency of Alcohol Consumption: Monthly or less     Average Number of Drinks: 1 or 2     Frequency of Binge Drinking: Never   Financial Resource Strain: Low Risk  (3/21/2025)    Overall Financial Resource Strain (CARDIA)     Difficulty of Paying Living Expenses: Not very hard   Food Insecurity: No Food Insecurity (3/18/2025)    Hunger Vital Sign     Worried About Running Out of Food in the Last Year: Never true     Ran Out of Food in the Last Year: Never true   Transportation Needs: No Transportation Needs (3/21/2025)    PRAPARE - Transportation     Lack of Transportation (Medical): No     Lack of Transportation (Non-Medical): No   Physical Activity: Insufficiently Active (3/18/2025)    Exercise Vital Sign     Days of Exercise per Week: 2 days     Minutes of Exercise per Session: 10 min   Stress: Not on file   Social Connections: Not on file   Intimate Partner Violence: Not At Risk (3/18/2025)    Humiliation, Afraid, Rape, and Kick questionnaire     Fear of Current or Ex-Partner: No     Emotionally Abused: No     Physically Abused: No     Sexually Abused: No   Depression: Not at risk (3/18/2025)    PHQ-2     PHQ-2 Score: 0   Housing Stability: Low Risk  (3/21/2025)    Housing Stability Vital Sign     Unable to Pay for Housing in the Last Year: No     Number of Times Moved in the Last Year: 0     Homeless in the Last Year: No   Utilities: Not At Risk (3/18/2025)    [facility] Utilities     Threatened with loss of utilities: No   Digital Equity: Not on file   Health Literacy: Not on file        Family History  Family History   Problem Relation Name Age of Onset    Colon cancer Mother Beverly     Osteoporosis Mother Beverly     Thyroid disease Mother Beverly     Heart disease Father Humphrey     Breast cancer Maternal Grandmother Grandma Beth     Heart disease Brother Gustavo Sanon     Heart disease Brother Gustavo Sanon         Home Medications  Prior to Admission medications    Medication Sig Start Date End Date  Taking? Authorizing Provider   amoxicillin-pot clavulanate (Augmentin) 875-125 mg tablet Take 1 tablet by mouth 2 times a day for 10 days. 3/15/25 3/25/25 Yes ROSAMARIA Brown-CNP   aspirin 81 mg EC tablet Take 1 tablet (81 mg) by mouth once daily.   Yes Historical Provider, MD   atorvastatin (Lipitor) 40 mg tablet Take 1 tablet (40 mg) by mouth once daily. 3/3/25  Yes Mor Neri, DO   busPIRone (Buspar) 5 mg tablet Take 1 tablet (5 mg) by mouth once daily as needed (if needed nerves). 3/3/25 2/26/26 Yes Mor Neri, DO   calcium carb/D3/magnesium/zinc (calcium carb-D3-mag cmb11-zinc) [phone] mg-unit-mg-mg tablet Take 1 tablet by mouth once daily. 2/26/11  Yes Historical Provider, MD   ergocalciferol (Vitamin D-2) 1.25 MG (55136 UT) capsule Take 1 capsule (1,250 mcg) by mouth 1 (one) time per week.  Patient taking differently: Take 1 capsule (1,250 mcg) by mouth 1 (one) time per week. Sunday 9/30/24  Yes Mor Neri DO   ibuprofen 600 mg tablet Take 1 tablet (600 mg) by mouth 2 times daily (morning and late afternoon). 9/30/24  Yes Mor Neri DO   meclizine (Antivert) 12.5 mg tablet Take 1 tablet (12.5 mg) by mouth 3 times a day as needed for dizziness. 1/8/24  Yes Mor Neri DO   metoprolol succinate XL (Toprol-XL) 50 mg 24 hr tablet Take 1 tablet (50 mg) by mouth once daily. Do not crush or chew. 9/30/24  Yes Mor Neri, DO   potassium chloride CR (Klor-Con) 10 mEq ER tablet Take 1 tablet (10 mEq) by mouth once daily for 4 days. Do not crush, chew, or split. 3/17/25 3/21/25 Yes Eder Landry MD   brompheniramine-pseudoeph-DM 2-30-10 mg/5 mL syrup Take 5 mL by mouth 4 times a day as needed for cough, allergies or congestion for up to 10 days. 3/15/25 3/25/25  ROSAMARIA Brown-CNP   estradiol (Estrace) 0.01 % (0.1 mg/gram) vaginal cream Insert 0.25 Applicatorfuls (1 g) into the vagina 3 times a week. 8/30/24 8/30/25  Carlene Zendejas, DO   lidocaine (Xylocaine) 2 % solution Take  1.25 mL by mouth every 6 hours if needed for mild pain (1 - 3) or moderate pain (4 - 6) for up to 10 days. 3/15/25 3/25/25  ABHISHEK Brown        Allergies  Patient has no known allergies.    Review of Systems  Review of Systems   Constitutional:  Negative for chills and fever.   Gastrointestinal:  Negative for abdominal pain, nausea and vomiting.   Genitourinary:  Positive for frequency. Negative for difficulty urinating, dysuria, flank pain and hematuria.     Physical Exam  Physical Exam  Vitals reviewed.   Constitutional:       General: She is awake. She is not in acute distress.  Pulmonary:      Effort: Pulmonary effort is normal.   Abdominal:      Palpations: Abdomen is soft.      Tenderness: There is no right CVA tenderness or left CVA tenderness.   Neurological:      Mental Status: She is alert and oriented to person, place, and time.   Psychiatric:         Behavior: Behavior normal. Behavior is cooperative.       Medications  Scheduled medications  aspirin, 81 mg, oral, Daily  [Held by provider] atorvastatin, 40 mg, oral, Daily  cefTRIAXone, 2 g, intravenous, q12h  heparin (porcine), 7,500 Units, subcutaneous, q8h ALVIN  melatonin, 5 mg, oral, Daily  metoprolol succinate XL, 50 mg, oral, Daily  nystatin, , Topical, BID  pantoprazole, 40 mg, oral, Daily      Continuous medications     PRN medications  PRN medications: acetaminophen **OR** acetaminophen **OR** acetaminophen, acetaminophen, benzocaine-menthol, benzonatate, guaiFENesin, ipratropium-albuteroL, lidocaine, ondansetron **OR** ondansetron     Last Recorded Vitals  Heart Rate:  [74-85]   Temp:  [36.1 °C (97 °F)-37.4 °C (99.3 °F)]   Resp:  [16-20]   BP: (138-170)/(72-79)   SpO2:  [92 %-94 %]      Input/Output    Intake/Output Summary (Last 24 hours) at 3/24/2025 0851  Last data filed at 3/24/2025 0602  Gross per 24 hour   Intake 270 ml   Output --   Net 270 ml        Labs  No results found for this or any previous visit (from the past 24  hours).    Imaging  CT abdomen pelvis w IV contrast    Result Date: 3/23/2025  Interpreted By:  Bebo Negrete, STUDY: CT ABDOMEN PELVIS W IV CONTRAST;  3/23/2025 1:47 pm   INDICATION: Signs/Symptoms:proteus bacteremia, persistent elevation LFTs, r/o liver involvement.   COMPARISON: 03/18/2025 right upper quadrant abdominal ultrasound. No prior abdominal or pelvic CT.   ACCESSION NUMBER(S): [id number]   ORDERING CLINICIAN: SORAIDA STERN   TECHNIQUE: CT of the abdomen and pelvis was performed.  Standard contiguous axial images were obtained at 3 mm slice thickness through the abdomen and pelvis. Coronal and sagittal reconstructions at 3 mm slice thickness were performed.  75 ML of Omnipaque 350 was administered intravenously without immediate complication.   FINDINGS: LOWER CHEST: Mild right lower lobe subsegmental atelectasis or scarring.   ABDOMEN:   LIVER: The liver is normal in size without evidence of focal lesions.   BILE DUCTS: The intrahepatic and extrahepatic ducts are not dilated.   GALLBLADDER: Cholecystectomy clips.   PANCREAS: Within normal limits.   SPLEEN: Normal size and homogeneous.   ADRENAL GLANDS: Within normal limits.   KIDNEYS AND URETERS: 10 mm axial diameter and 18 mm tall very proximal (superior) left ureteral calcification at the L3 level. Mild left hydronephrosis without perinephric stranding. Multiple calyceal and infundibular lower left renal calcifications measure up to almost 1 cm. Normal size kidneys without a mass or right hydroureteronephrosis.   PELVIS:   BLADDER: Collapsed precluding evaluation for mucosal lesions. No calcified stone.   REPRODUCTIVE ORGANS: Status post hysterectomy. The ovaries can not be identified.   BOWEL: The stomach is unremarkable.   Moderate amount of stool without bowel distention. Almost gasless small bowel. Normal retrocecal appendix.   VESSELS: The aorta and IVC appear normal. Minimal vascular calcification.   PERITONEUM/RETROPERITONEUM/LYMPH  NODES: No ascites, free air or fluid collection.  No abdominopelvic lymphadenopathy.   BONES AND ABDOMINAL WALL: Marked, extensive spinal degenerative changes include very severe bilateral hypertrophic L4-5 facet joint DJD accounting for minimal spondylolisthesis at this level. Slight scoliosis. Bilateral sacroiliac joint DJD. Diastasis recti.       1. 10 x 10 x 18 mm proximal left ureteral calculus with mild left hydronephrosis. 2. Left renal calculi.   MACRO: None   Signed by: Bebo Negrete 3/23/2025 3:11 PM Dictation workstation:   [id number]    Plan  1. Obstructing proximal left ureteral calculus with bacteremia  -Discussed with Dr. Hatfield, IR consult for possible perc nephrostomy tube insertion.  -Repeat blood cultures wo growth at 1 day.  -ID on consult and managing abx therapy.  -Will eventually need mini PCNL for definitive treatment of stone.  -Above discussed with patient.    2. Left renal calculi  -Nonobstructing, measuring up to 1 cm.  -No acute intervention necessary at this time.    Plan of care discussed with Dr. Oral Hatfield MD.    Gisselle Ayala PA-C

## 2025-03-24 NOTE — CARE PLAN
The patient's goals for the shift include      The clinical goals for the shift include relief of pain with PRN meds

## 2025-03-25 ENCOUNTER — APPOINTMENT (OUTPATIENT)
Dept: RADIOLOGY | Facility: HOSPITAL | Age: 70
DRG: 871 | End: 2025-03-25
Payer: MEDICARE

## 2025-03-25 LAB
ANION GAP SERPL CALC-SCNC: 13 MMOL/L (ref 10–20)
BASOPHILS # BLD AUTO: 0.06 X10*3/UL (ref 0–0.1)
BASOPHILS NFR BLD AUTO: 0.6 %
BUN SERPL-MCNC: 12 MG/DL (ref 6–23)
CALCIUM SERPL-MCNC: 8.5 MG/DL (ref 8.6–10.3)
CHLORIDE SERPL-SCNC: 104 MMOL/L (ref 98–107)
CO2 SERPL-SCNC: 27 MMOL/L (ref 21–32)
CREAT SERPL-MCNC: 0.91 MG/DL (ref 0.5–1.05)
EGFRCR SERPLBLD CKD-EPI 2021: 68 ML/MIN/1.73M*2
EOSINOPHIL # BLD AUTO: 0.24 X10*3/UL (ref 0–0.7)
EOSINOPHIL NFR BLD AUTO: 2.5 %
ERYTHROCYTE [DISTWIDTH] IN BLOOD BY AUTOMATED COUNT: 15.9 % (ref 11.5–14.5)
GLUCOSE SERPL-MCNC: 109 MG/DL (ref 74–99)
HCT VFR BLD AUTO: 30.1 % (ref 36–46)
HGB BLD-MCNC: 10 G/DL (ref 12–16)
IMM GRANULOCYTES # BLD AUTO: 0.2 X10*3/UL (ref 0–0.7)
IMM GRANULOCYTES NFR BLD AUTO: 2 % (ref 0–0.9)
LYMPHOCYTES # BLD AUTO: 1.49 X10*3/UL (ref 1.2–4.8)
LYMPHOCYTES NFR BLD AUTO: 15.2 %
MAGNESIUM SERPL-MCNC: 1.74 MG/DL (ref 1.6–2.4)
MCH RBC QN AUTO: 30.5 PG (ref 26–34)
MCHC RBC AUTO-ENTMCNC: 33.2 G/DL (ref 32–36)
MCV RBC AUTO: 92 FL (ref 80–100)
MONOCYTES # BLD AUTO: 0.47 X10*3/UL (ref 0.1–1)
MONOCYTES NFR BLD AUTO: 4.8 %
NEUTROPHILS # BLD AUTO: 7.33 X10*3/UL (ref 1.2–7.7)
NEUTROPHILS NFR BLD AUTO: 74.9 %
NRBC BLD-RTO: 0 /100 WBCS (ref 0–0)
PLATELET # BLD AUTO: 423 X10*3/UL (ref 150–450)
POTASSIUM SERPL-SCNC: 3.1 MMOL/L (ref 3.5–5.3)
RBC # BLD AUTO: 3.28 X10*6/UL (ref 4–5.2)
SODIUM SERPL-SCNC: 141 MMOL/L (ref 136–145)
WBC # BLD AUTO: 9.8 X10*3/UL (ref 4.4–11.3)

## 2025-03-25 PROCEDURE — 2500000004 HC RX 250 GENERAL PHARMACY W/ HCPCS (ALT 636 FOR OP/ED): Performed by: INTERNAL MEDICINE

## 2025-03-25 PROCEDURE — 2500000001 HC RX 250 WO HCPCS SELF ADMINISTERED DRUGS (ALT 637 FOR MEDICARE OP): Performed by: NURSE PRACTITIONER

## 2025-03-25 PROCEDURE — 85025 COMPLETE CBC W/AUTO DIFF WBC: CPT | Performed by: INTERNAL MEDICINE

## 2025-03-25 PROCEDURE — 83735 ASSAY OF MAGNESIUM: CPT | Performed by: INTERNAL MEDICINE

## 2025-03-25 PROCEDURE — 2500000001 HC RX 250 WO HCPCS SELF ADMINISTERED DRUGS (ALT 637 FOR MEDICARE OP): Performed by: INTERNAL MEDICINE

## 2025-03-25 PROCEDURE — 99232 SBSQ HOSP IP/OBS MODERATE 35: CPT | Performed by: HOSPITALIST

## 2025-03-25 PROCEDURE — 2500000002 HC RX 250 W HCPCS SELF ADMINISTERED DRUGS (ALT 637 FOR MEDICARE OP, ALT 636 FOR OP/ED): Performed by: HOSPITALIST

## 2025-03-25 PROCEDURE — 76937 US GUIDE VASCULAR ACCESS: CPT

## 2025-03-25 PROCEDURE — C1751 CATH, INF, PER/CENT/MIDLINE: HCPCS

## 2025-03-25 PROCEDURE — 2720000007 HC OR 272 NO HCPCS

## 2025-03-25 PROCEDURE — 99232 SBSQ HOSP IP/OBS MODERATE 35: CPT

## 2025-03-25 PROCEDURE — 2060000001 HC INTERMEDIATE ICU ROOM DAILY

## 2025-03-25 PROCEDURE — 36415 COLL VENOUS BLD VENIPUNCTURE: CPT | Performed by: INTERNAL MEDICINE

## 2025-03-25 PROCEDURE — 82374 ASSAY BLOOD CARBON DIOXIDE: CPT | Performed by: INTERNAL MEDICINE

## 2025-03-25 PROCEDURE — 99232 SBSQ HOSP IP/OBS MODERATE 35: CPT | Performed by: INTERNAL MEDICINE

## 2025-03-25 RX ORDER — POTASSIUM CHLORIDE 20 MEQ/1
40 TABLET, EXTENDED RELEASE ORAL ONCE
Status: COMPLETED | OUTPATIENT
Start: 2025-03-25 | End: 2025-03-25

## 2025-03-25 RX ADMIN — CEFTRIAXONE SODIUM 2 G: 2 INJECTION, POWDER, FOR SOLUTION INTRAMUSCULAR; INTRAVENOUS at 16:37

## 2025-03-25 RX ADMIN — NYSTATIN: 100000 CREAM TOPICAL at 18:50

## 2025-03-25 RX ADMIN — OXYCODONE 5 MG: 5 TABLET ORAL at 09:36

## 2025-03-25 RX ADMIN — OXYCODONE 5 MG: 5 TABLET ORAL at 16:43

## 2025-03-25 RX ADMIN — POTASSIUM CHLORIDE 40 MEQ: 1500 TABLET, EXTENDED RELEASE ORAL at 09:32

## 2025-03-25 RX ADMIN — Medication 5 MG: at 19:44

## 2025-03-25 RX ADMIN — ASPIRIN 81 MG: 81 TABLET, COATED ORAL at 09:32

## 2025-03-25 RX ADMIN — OXYCODONE 5 MG: 5 TABLET ORAL at 20:46

## 2025-03-25 RX ADMIN — NYSTATIN: 100000 CREAM TOPICAL at 05:37

## 2025-03-25 RX ADMIN — HEPARIN SODIUM 7500 UNITS: 5000 INJECTION INTRAVENOUS; SUBCUTANEOUS at 14:54

## 2025-03-25 RX ADMIN — PANTOPRAZOLE SODIUM 40 MG: 40 TABLET, DELAYED RELEASE ORAL at 05:35

## 2025-03-25 RX ADMIN — METOPROLOL SUCCINATE 50 MG: 50 TABLET, EXTENDED RELEASE ORAL at 09:32

## 2025-03-25 RX ADMIN — HEPARIN SODIUM 7500 UNITS: 5000 INJECTION INTRAVENOUS; SUBCUTANEOUS at 22:16

## 2025-03-25 RX ADMIN — HEPARIN SODIUM 7500 UNITS: 5000 INJECTION INTRAVENOUS; SUBCUTANEOUS at 05:35

## 2025-03-25 ASSESSMENT — COGNITIVE AND FUNCTIONAL STATUS - GENERAL
CLIMB 3 TO 5 STEPS WITH RAILING: A LITTLE
HELP NEEDED FOR BATHING: A LITTLE
DRESSING REGULAR LOWER BODY CLOTHING: A LITTLE
WALKING IN HOSPITAL ROOM: A LITTLE
DAILY ACTIVITIY SCORE: 21
MOBILITY SCORE: 22
DRESSING REGULAR UPPER BODY CLOTHING: A LITTLE

## 2025-03-25 ASSESSMENT — PAIN DESCRIPTION - DESCRIPTORS
DESCRIPTORS: SHARP

## 2025-03-25 ASSESSMENT — PAIN SCALES - GENERAL
PAINLEVEL_OUTOF10: 7
PAINLEVEL_OUTOF10: 1
PAINLEVEL_OUTOF10: 6
PAINLEVEL_OUTOF10: 6
PAINLEVEL_OUTOF10: 7
PAINLEVEL_OUTOF10: 7

## 2025-03-25 ASSESSMENT — PAIN - FUNCTIONAL ASSESSMENT
PAIN_FUNCTIONAL_ASSESSMENT: 0-10

## 2025-03-25 ASSESSMENT — PAIN DESCRIPTION - ORIENTATION
ORIENTATION: LEFT;LOWER
ORIENTATION: LEFT;LOWER

## 2025-03-25 ASSESSMENT — PAIN DESCRIPTION - LOCATION
LOCATION: BACK
LOCATION: BACK

## 2025-03-25 ASSESSMENT — ACTIVITIES OF DAILY LIVING (ADL): EFFECT OF PAIN ON DAILY ACTIVITIES: NO

## 2025-03-25 NOTE — CARE PLAN
The patient's goals for the shift include      The clinical goals for the shift include patient will report pain is uncontrol throughout shift

## 2025-03-25 NOTE — PROGRESS NOTES
Discussed plan for home with pt, possibility of [facility] for new nephrostomy. Per pt plan is to go home on po antibiotics, follow up outpatient with urology for nephrostomy removal. Awaiting final recs from ID. Provided pt with choice list, [facility] was chosen. 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 Will forward to attending to address. Pt agreeable to [facility] but does want to return to work if no restrictions.     UPDATE- per Gisselle Ayala, urology PA, it is recommended that the pt not lift more than 10 pounds while the nephrostomy is in. Pt states she will take time off from work until follow up with urology and tube is removed. Requested [facility] internal referral to  from MD. Pt to schedule follow up with Dr. Hatfield for plan for kidney stone and tube removal. ID recs for atb still pending.    UPDATE- [facility] notified of referral pending ID recs to determine if she will go home on po or IV ATB. MD aware of need for home care orders and internal referral to [facility].

## 2025-03-25 NOTE — PROGRESS NOTES
Beth Quinones is a 69 y.o. female on day 8 of admission presenting with HEDY (acute kidney injury) (CMS-HCC).    Subjective   Follow-up obstructing left ureteral stone with bacteremia.  s/p left percutaneous nephrostomy insertion yesterday with IR.  Patient feeling well today, reports being tired.  Denies any pain, fever, chills.  Nephrostomy bag recently emptied, she reports good output.  Leukocytosis resolved.  Repeat blood cultures neg to date.  Definitive stone treatment discussed.     Objective     Physical Exam  Vitals reviewed.   Constitutional:       General: She is awake. She is not in acute distress.     Appearance: She is not ill-appearing.   Pulmonary:      Effort: Pulmonary effort is normal.   Genitourinary:     Comments: Left percutaneous nephrostomy tube to continuous drainage with urine light yellow clear.  Neurological:      Mental Status: She is alert and oriented to person, place, and time.   Psychiatric:         Behavior: Behavior normal. Behavior is cooperative.       Last Recorded Vitals  Blood pressure 118/59, pulse 75, temperature 36.9 °C (98.4 °F), temperature source Temporal, resp. rate 19, height 1.524 m (5'), weight 95.7 kg (211 lb), SpO2 94%.  Intake/Output last 3 Shifts:  I/O last 3 completed shifts:  In: 220 (2.3 mL/kg) [P.O.:220]  Out: 990 (10.3 mL/kg) [Urine:990 (0.3 mL/kg/hr)]  Weight: 95.7 kg     Relevant Results  Scheduled medications  aspirin, 81 mg, oral, Daily  [Held by provider] atorvastatin, 40 mg, oral, Daily  cefTRIAXone, 2 g, intravenous, q12h  heparin (porcine), 7,500 Units, subcutaneous, q8h ALVIN  melatonin, 5 mg, oral, Daily  metoprolol succinate XL, 50 mg, oral, Daily  nystatin, , Topical, BID  pantoprazole, 40 mg, oral, Daily  potassium chloride CR, 40 mEq, oral, Once      Continuous medications     PRN medications  PRN medications: acetaminophen **OR** acetaminophen **OR** acetaminophen, acetaminophen, benzocaine-menthol, benzonatate, guaiFENesin,  ipratropium-albuteroL, lidocaine, ondansetron **OR** ondansetron, oxyCODONE    Results for orders placed or performed during the hospital encounter of 03/17/25 (from the past 24 hours)   Sterile Fluid Culture/Smear    Specimen: Aspirate; Fluid   Result Value Ref Range    Sterile Fluid Culture/Smear No growth to date     Gram Stain (4+) Abundant Polymorphonuclear leukocytes     Gram Stain No organisms seen    Basic Metabolic Panel   Result Value Ref Range    Glucose 109 (H) 74 - 99 mg/dL    Sodium 141 136 - 145 mmol/L    Potassium 3.1 (L) 3.5 - 5.3 mmol/L    Chloride 104 98 - 107 mmol/L    Bicarbonate 27 21 - 32 mmol/L    Anion Gap 13 10 - 20 mmol/L    Urea Nitrogen 12 6 - 23 mg/dL    Creatinine 0.91 0.50 - 1.05 mg/dL    eGFR 68 >60 mL/min/1.73m*2    Calcium 8.5 (L) 8.6 - 10.3 mg/dL   CBC and Auto Differential   Result Value Ref Range    WBC 9.8 4.4 - 11.3 x10*3/uL    nRBC 0.0 0.0 - 0.0 /100 WBCs    RBC 3.28 (L) 4.00 - 5.20 x10*6/uL    Hemoglobin 10.0 (L) 12.0 - 16.0 g/dL    Hematocrit 30.1 (L) 36.0 - 46.0 %    MCV 92 80 - 100 fL    MCH 30.5 26.0 - 34.0 pg    MCHC 33.2 32.0 - 36.0 g/dL    RDW 15.9 (H) 11.5 - 14.5 %    Platelets 423 150 - 450 x10*3/uL    Neutrophils % 74.9 40.0 - 80.0 %    Immature Granulocytes %, Automated 2.0 (H) 0.0 - 0.9 %    Lymphocytes % 15.2 13.0 - 44.0 %    Monocytes % 4.8 2.0 - 10.0 %    Eosinophils % 2.5 0.0 - 6.0 %    Basophils % 0.6 0.0 - 2.0 %    Neutrophils Absolute 7.33 1.20 - 7.70 x10*3/uL    Immature Granulocytes Absolute, Automated 0.20 0.00 - 0.70 x10*3/uL    Lymphocytes Absolute 1.49 1.20 - 4.80 x10*3/uL    Monocytes Absolute 0.47 0.10 - 1.00 x10*3/uL    Eosinophils Absolute 0.24 0.00 - 0.70 x10*3/uL    Basophils Absolute 0.06 0.00 - 0.10 x10*3/uL   Magnesium   Result Value Ref Range    Magnesium 1.74 1.60 - 2.40 mg/dL     IR placement of nephrostomy catheter    Result Date: 3/24/2025  Interpreted By:  Schoenberger, Joseph, STUDY: IR  NEPHROSTOMY PLACEMENT; ;  3/24/2025 2:00 pm    INDICATION: Signs/Symptoms:Large left ureteral stone, bacteremia.     COMPARISON: None.   ACCESSION NUMBER(S): [id number]   ORDERING CLINICIAN: JIMBO AMATO   CONSENT: The procedure, its indication, its risks, and alternatives were explained to the patient who understands and consents to the procedure. A time-out is completed prior to the procedure to confirm patient identity and procedure to be performed.   MODALITIES: ultrasound, flouroscopy   TECHNIQUE: The patient was placed prone in special procedures. A targeted sonographic evaluation of the left kidney was performed. This demonstrates mild left hydronephrosis. The posterior interpolar calyx was targeted for nephrostomy in appropriate site on the patient's skin was marked for percutaneous access.   All personnel in the procedure suite used appropriate mask and cap. Additionally, the performing physician and assistant used maximum barrier technique to include a sterile gown and gloves as per standard hospital protocol. The marked site and adjacent skin was sterilely prepped with chlorhexidine in the usual manner. A large sterile barrier was used to to completely draped the patient is outside of the sterilely prepped area in the usual manner per standard hospital protocol. Local anesthetic was administered to include deep local anesthetic administered using ultrasound guided placement of a 21 gauge needle along the expected course of nephrostomy placement from the skin surface to the surface of the kidney. Under direct sonographic visualization, a 21 gauge needle was then advanced into the posterior interpolar calyx. The needle tip was visualized within the calyx sonographically. There was return of cloudy urine. 8 2 cc sample of this urine was then aspirated. It was sent to the laboratory for studies as ordered by the referring physician.   Under fluoroscopy guidance, a 0.018 in guidewire was advanced through the needle. It looped the Specter distribution of  the renal pelvis terminating at the level of the large proximal left ureter stone. A transition dilator was then used to convert this wire to a 0.035 in [place]on guidewire. In incision was then made at the site to facilitate drainage catheter placement. The transition dilator was removed. A 7 Citizen of [place] vascular dilator was advanced over the guidewire to dilate the tract to the kidney. An 8.5 Citizen [place] locking pigtail drainage catheter was then advanced over the wire. Under fluoroscopy guidance, its pigtail loop was formed within the renal pelvis. Contrast nephrostogram was then performed demonstrating obstruction of the ureter at the proximal ureter 2 2 fluoroscopically visible relatively large proximal left ureter stone. Flow below this stone was not convincingly demonstrated during this exam though there is hesitancy 2 pressure inject due the patient's infected status. The catheter was then attached to gravity drainage with mildly cloudy urine draining freely into the back with only minimal tinging with blood as expected. The renal collecting system successfully decompressed fluoroscopy. A appropriate anchor dressing was then placed. Patient tolerated the procedure well. There was no immediate complication.   FINDINGS: See discussion above.       Successful ultrasound and fluoroscopy guided placement of a left-sided percutaneous nephrostomy drainage catheter.   Obstruction of the proximal left ureter due to relatively large fluoroscopically visible stone.     MACRO: None   Signed by: Joseph Schoenberger 3/24/2025 2:35 PM Dictation workstation:   [id number]    CT abdomen pelvis w IV contrast    Result Date: 3/23/2025  Interpreted By:  Bebo Negrete, STUDY: CT ABDOMEN PELVIS W IV CONTRAST;  3/23/2025 1:47 pm   INDICATION: Signs/Symptoms:proteus bacteremia, persistent elevation LFTs, r/o liver involvement.   COMPARISON: 03/18/2025 right upper quadrant abdominal ultrasound. No prior abdominal or pelvic CT.    ACCESSION NUMBER(S): [id number]   ORDERING CLINICIAN: SORAIDA STERN   TECHNIQUE: CT of the abdomen and pelvis was performed.  Standard contiguous axial images were obtained at 3 mm slice thickness through the abdomen and pelvis. Coronal and sagittal reconstructions at 3 mm slice thickness were performed.  75 ML of Omnipaque 350 was administered intravenously without immediate complication.   FINDINGS: LOWER CHEST: Mild right lower lobe subsegmental atelectasis or scarring.   ABDOMEN:   LIVER: The liver is normal in size without evidence of focal lesions.   BILE DUCTS: The intrahepatic and extrahepatic ducts are not dilated.   GALLBLADDER: Cholecystectomy clips.   PANCREAS: Within normal limits.   SPLEEN: Normal size and homogeneous.   ADRENAL GLANDS: Within normal limits.   KIDNEYS AND URETERS: 10 mm axial diameter and 18 mm tall very proximal (superior) left ureteral calcification at the L3 level. Mild left hydronephrosis without perinephric stranding. Multiple calyceal and infundibular lower left renal calcifications measure up to almost 1 cm. Normal size kidneys without a mass or right hydroureteronephrosis.   PELVIS:   BLADDER: Collapsed precluding evaluation for mucosal lesions. No calcified stone.   REPRODUCTIVE ORGANS: Status post hysterectomy. The ovaries can not be identified.   BOWEL: The stomach is unremarkable.   Moderate amount of stool without bowel distention. Almost gasless small bowel. Normal retrocecal appendix.   VESSELS: The aorta and IVC appear normal. Minimal vascular calcification.   PERITONEUM/RETROPERITONEUM/LYMPH NODES: No ascites, free air or fluid collection.  No abdominopelvic lymphadenopathy.   BONES AND ABDOMINAL WALL: Marked, extensive spinal degenerative changes include very severe bilateral hypertrophic L4-5 facet joint DJD accounting for minimal spondylolisthesis at this level. Slight scoliosis. Bilateral sacroiliac joint DJD. Diastasis recti.       1. 10 x 10 x 18 mm proximal  left ureteral calculus with mild left hydronephrosis. 2. Left renal calculi.   MACRO: None   Signed by: Bebo Caden 3/23/2025 3:11 PM Dictation workstation:   [id number]    Assessment/Plan   1. Obstructing proximal left ureteral calculus with bacteremia  -s/p left perc. nephrostomy tube insertion yesterday with IR.  -Leukocytosis resolved.  Afebrile.  -Repeat blood cultures wo growth at 2 days.  -ID on consult and managing abx therapy.  -Will need outpatient fu with Dr. Hatfield to discuss mini PCNL surgery for definitive treatment of stone.  -Recommend adding HHC for PCNT mngt, discussed with Medicine.  -POC discussed with patient.      2. Left renal calculi  -Nonobstructing, measuring up to 1 cm.  -No acute intervention necessary at this time.    Gisselle Ayala PA-C

## 2025-03-26 ENCOUNTER — HOME HEALTH ADMISSION (OUTPATIENT)
Dept: HOME HEALTH SERVICES | Facility: HOME HEALTH | Age: 70
End: 2025-03-26
Payer: MEDICARE

## 2025-03-26 ENCOUNTER — DOCUMENTATION (OUTPATIENT)
Dept: HOME HEALTH SERVICES | Facility: HOME HEALTH | Age: 70
End: 2025-03-26
Payer: MEDICARE

## 2025-03-26 VITALS
SYSTOLIC BLOOD PRESSURE: 116 MMHG | OXYGEN SATURATION: 92 % | HEART RATE: 78 BPM | WEIGHT: 211 LBS | TEMPERATURE: 98.2 F | DIASTOLIC BLOOD PRESSURE: 70 MMHG | HEIGHT: 60 IN | RESPIRATION RATE: 18 BRPM | BODY MASS INDEX: 41.43 KG/M2

## 2025-03-26 LAB
ANION GAP SERPL CALC-SCNC: 10 MMOL/L (ref 10–20)
BACTERIA BLD CULT: NORMAL
BACTERIA BLD CULT: NORMAL
BACTERIA CSF CULT: NORMAL
BASOPHILS # BLD AUTO: 0.06 X10*3/UL (ref 0–0.1)
BASOPHILS NFR BLD AUTO: 0.7 %
BUN SERPL-MCNC: 13 MG/DL (ref 6–23)
CALCIUM SERPL-MCNC: 8.4 MG/DL (ref 8.6–10.3)
CHLORIDE SERPL-SCNC: 104 MMOL/L (ref 98–107)
CO2 SERPL-SCNC: 28 MMOL/L (ref 21–32)
CREAT SERPL-MCNC: 0.92 MG/DL (ref 0.5–1.05)
EGFRCR SERPLBLD CKD-EPI 2021: 67 ML/MIN/1.73M*2
EOSINOPHIL # BLD AUTO: 0.14 X10*3/UL (ref 0–0.7)
EOSINOPHIL NFR BLD AUTO: 1.6 %
ERYTHROCYTE [DISTWIDTH] IN BLOOD BY AUTOMATED COUNT: 15.6 % (ref 11.5–14.5)
GLUCOSE SERPL-MCNC: 115 MG/DL (ref 74–99)
GRAM STN SPEC: NORMAL
GRAM STN SPEC: NORMAL
HCT VFR BLD AUTO: 29.9 % (ref 36–46)
HGB BLD-MCNC: 9.8 G/DL (ref 12–16)
IMM GRANULOCYTES # BLD AUTO: 0.08 X10*3/UL (ref 0–0.7)
IMM GRANULOCYTES NFR BLD AUTO: 0.9 % (ref 0–0.9)
LYMPHOCYTES # BLD AUTO: 1.59 X10*3/UL (ref 1.2–4.8)
LYMPHOCYTES NFR BLD AUTO: 17.8 %
MAGNESIUM SERPL-MCNC: 1.72 MG/DL (ref 1.6–2.4)
MCH RBC QN AUTO: 30.2 PG (ref 26–34)
MCHC RBC AUTO-ENTMCNC: 32.8 G/DL (ref 32–36)
MCV RBC AUTO: 92 FL (ref 80–100)
MONOCYTES # BLD AUTO: 0.43 X10*3/UL (ref 0.1–1)
MONOCYTES NFR BLD AUTO: 4.8 %
NEUTROPHILS # BLD AUTO: 6.61 X10*3/UL (ref 1.2–7.7)
NEUTROPHILS NFR BLD AUTO: 74.2 %
NRBC BLD-RTO: 0 /100 WBCS (ref 0–0)
PLATELET # BLD AUTO: 464 X10*3/UL (ref 150–450)
POTASSIUM SERPL-SCNC: 3.4 MMOL/L (ref 3.5–5.3)
RBC # BLD AUTO: 3.24 X10*6/UL (ref 4–5.2)
SODIUM SERPL-SCNC: 139 MMOL/L (ref 136–145)
WBC # BLD AUTO: 8.9 X10*3/UL (ref 4.4–11.3)

## 2025-03-26 PROCEDURE — 2500000004 HC RX 250 GENERAL PHARMACY W/ HCPCS (ALT 636 FOR OP/ED): Performed by: INTERNAL MEDICINE

## 2025-03-26 PROCEDURE — 2500000001 HC RX 250 WO HCPCS SELF ADMINISTERED DRUGS (ALT 637 FOR MEDICARE OP): Performed by: NURSE PRACTITIONER

## 2025-03-26 PROCEDURE — 2500000001 HC RX 250 WO HCPCS SELF ADMINISTERED DRUGS (ALT 637 FOR MEDICARE OP): Performed by: INTERNAL MEDICINE

## 2025-03-26 PROCEDURE — 36415 COLL VENOUS BLD VENIPUNCTURE: CPT | Performed by: INTERNAL MEDICINE

## 2025-03-26 PROCEDURE — 99239 HOSP IP/OBS DSCHRG MGMT >30: CPT | Performed by: HOSPITALIST

## 2025-03-26 PROCEDURE — 83735 ASSAY OF MAGNESIUM: CPT | Performed by: INTERNAL MEDICINE

## 2025-03-26 PROCEDURE — 85025 COMPLETE CBC W/AUTO DIFF WBC: CPT | Performed by: INTERNAL MEDICINE

## 2025-03-26 PROCEDURE — 80048 BASIC METABOLIC PNL TOTAL CA: CPT | Performed by: INTERNAL MEDICINE

## 2025-03-26 RX ORDER — OXYCODONE AND ACETAMINOPHEN 5; 325 MG/1; MG/1
1 TABLET ORAL EVERY 8 HOURS PRN
Qty: 12 TABLET | Refills: 0 | Status: SHIPPED | OUTPATIENT
Start: 2025-03-26

## 2025-03-26 RX ORDER — CIPROFLOXACIN 500 MG/1
500 TABLET ORAL 2 TIMES DAILY
Qty: 14 TABLET | Refills: 0 | Status: SHIPPED | OUTPATIENT
Start: 2025-03-26 | End: 2025-04-02

## 2025-03-26 RX ADMIN — METOPROLOL SUCCINATE 50 MG: 50 TABLET, EXTENDED RELEASE ORAL at 09:11

## 2025-03-26 RX ADMIN — NYSTATIN: 100000 CREAM TOPICAL at 05:54

## 2025-03-26 RX ADMIN — OXYCODONE 5 MG: 5 TABLET ORAL at 09:11

## 2025-03-26 RX ADMIN — CEFTRIAXONE SODIUM 2 G: 2 INJECTION, POWDER, FOR SOLUTION INTRAMUSCULAR; INTRAVENOUS at 04:19

## 2025-03-26 RX ADMIN — ASPIRIN 81 MG: 81 TABLET, COATED ORAL at 09:11

## 2025-03-26 RX ADMIN — HEPARIN SODIUM 7500 UNITS: 5000 INJECTION INTRAVENOUS; SUBCUTANEOUS at 05:53

## 2025-03-26 RX ADMIN — PANTOPRAZOLE SODIUM 40 MG: 40 TABLET, DELAYED RELEASE ORAL at 05:53

## 2025-03-26 ASSESSMENT — PAIN DESCRIPTION - ORIENTATION: ORIENTATION: LEFT;LOWER

## 2025-03-26 ASSESSMENT — PAIN SCALES - GENERAL
PAINLEVEL_OUTOF10: 6
PAINLEVEL_OUTOF10: 2

## 2025-03-26 ASSESSMENT — PAIN DESCRIPTION - LOCATION: LOCATION: BACK

## 2025-03-26 NOTE — HH CARE COORDINATION
Home Care received a Referral for Nursing, Physical Therapy, and Occupational Therapy. We have processed the referral for a Start of Care on 24-48 HOURS .     If you have any questions or concerns, please feel free to contact us at [phone]. Follow the prompts, enter your five digit zip code, and you will be directed to your care team on [facility] 1.

## 2025-03-26 NOTE — DISCHARGE SUMMARY
Discharge Diagnosis  HEDY (acute kidney injury) (CMS-[facility])  Sepsis with metabolic encephalopathy and elevated LFTs  Proteus Mirbelis bacteremia    Issues Requiring Follow-Up  DC with home health   Follow with URL   Follow up with PCP in 1-2 weeks     Discharge Meds     Medication List      START taking these medications     ciprofloxacin 500 mg tablet; Commonly known as: Cipro; Take 1 tablet   (500 mg) by mouth 2 times a day for 7 days.     CONTINUE taking these medications     aspirin 81 mg EC tablet   atorvastatin 40 mg tablet; Commonly known as: Lipitor; Take 1 tablet (40   mg) by mouth once daily.   busPIRone 5 mg tablet; Commonly known as: Buspar; Take 1 tablet (5 mg)   by mouth once daily as needed (if needed nerves).   calcium carb-D3-mag cmb11-zinc 333 mg-200 unit -133 mg-5 mg tablet   ergocalciferol 1250 mcg (50,000 units) capsule; Commonly known as:   Vitamin D-2; Take 1 capsule (1,250 mcg) by mouth 1 (one) time per week.   estradiol 0.01 % (0.1 mg/gram) vaginal cream; Commonly known as:   Estrace; Insert 0.25 Applicatorfuls (1 g) into the vagina 3 times a week.   ibuprofen 600 mg tablet; Take 1 tablet (600 mg) by mouth 2 times daily   (morning and late afternoon).   meclizine 12.5 mg tablet; Commonly known as: Antivert; Take 1 tablet   (12.5 mg) by mouth 3 times a day as needed for dizziness.   metoprolol succinate XL 50 mg 24 hr tablet; Commonly known as:   Toprol-XL; Take 1 tablet (50 mg) by mouth once daily. Do not crush or   chew.     STOP taking these medications     amoxicillin-pot clavulanate 875-125 mg tablet; Commonly known as:   Augmentin   brompheniramine-pseudoeph-DM 2-30-10 mg/5 mL syrup   lidocaine 2 % solution; Commonly known as: Xylocaine   potassium chloride CR 10 mEq ER tablet; Commonly known as: Klor-Con       Test Results Pending At Discharge  Pending Labs       Order Current Status    Blood Culture Preliminary result    Blood Culture Preliminary result    Sterile Fluid Culture/Smear  Preliminary result            Hospital Course     Beth Quinones is a 69 y.o. female with a significant past medical history of HTN, HLD, presenting to [place] [facility] with complaints of intractable headache.  Began on Wednesday with a gradual onset 10 out of 10 in severity.  No gait or balance instability.  Denies any fevers chills, dizziness syncope, diarrhea.  No history of vertigo,   CVA, DVT or PE.  Initially patient was discharged home however before getting home family saw her drive past the the home twice before pulling into the driveway.       Lab work shows evidence of HEDY, SCR 1.61, GFR 34 with baseline near normal, transaminitis is present T. bili is normal no complaints of abdominal pain.  Trend and monitor CBC with evidence of leukocytosis WBC 13.4, H/H11.8/33.7, absolute neutrophil count 11.49, flu COVID swabs are negative CTA head neck negative for acute findings however there is a developmental abnormality noted however no evidence for either infarction or dissection.  Defer MRI at this time consider ordering in a.m.     She was admitted to hospital for inpatient status. CTA was unremarkable. Neurology was consulted. ID was consulted. Blood cultures grew Mirabilis proteus. She was started on Rocephin. LP was done and meningitis was ruled out.CT a/p ordered and a 10 x 10 x 18 mm proximal left ureteral calculus with mild left hydronephrosis was found along with other nonobstructing left renal calculi. HEDY and leukocytosis have resolved. Urine culture was negative. URL was consulted. She underwent nephrostomy tube placement. Repeat blood cultures was negative. Discharged home in stable condition on CIPRO 500 mg BID for one week.       Pertinent Physical Exam At Time of Discharge  Physical Exam  Physical Exam  GEN: comfortable, appears stated age  HEENT: NCAT, PERRLA, EOMI, moist mucous membranes  NECK: supple, no masses  CV: RRR, no m/r/g, no LE edema  LUNGS: CTAB, no w/r/c  ABD: soft, NT, ND, NBS  SKIN: no  cassandra  MSK; no gross deformities, normal joints  NEURO: A+Ox3, no FND  PSYCH: appropriate mood, affect  Outpatient Follow-Up  Future Appointments   Date Time Provider Department Center   3/31/2025 11:20 AM Gisselle Ayala PA-C [id number] [place]   4/1/2025  9:00 AM DO Cindy Muñoz [place]   4/28/2025  8:00 AM Carlos Haskins DO [email or web address] [place]   5/12/2025  8:15 AM DO Cindy Muñoz [place]   3/9/2026  9:00 AM DO Cindy Muñoz [place]     Discharge time >30 min     Bao Pace MD

## 2025-03-26 NOTE — PROGRESS NOTES
Pt discharged home [facility],  pt has nephrostomy tube and will be homebound while in place.   Pt to be discharged  with PO antibiotics.    Update: home care orders noted have nephrostomy tube care per attending.  Any additional/ further orders  will need to be obtained via urology.

## 2025-03-26 NOTE — PROGRESS NOTES
Infectious Disease Progress Note       3/25/2025    Patient is a followup regarding headache with sore throat, history of blisters in the mouth, bilateral ear pain, mild transaminitis, fevers which are now trending down, with consideration for meningitis        Patient had lumbar puncture, no evidence of meningitis.  Isolation discontinued.  C. difficile test was negative as well.  Diarrhea has somewhat come down.    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    1 out of 2 blood cultures from 3/17/2025 with gram-negative bacilli  Proteus    No fever  Lab Results   Component Value Date    WBC 9.8 03/25/2025    HGB 10.0 (L) 03/25/2025    HCT 30.1 (L) 03/25/2025    MCV 92 03/25/2025     03/25/2025     Lab Results   Component Value Date    GLUCOSE 109 (H) 03/25/2025    CALCIUM 8.5 (L) 03/25/2025     03/25/2025    K 3.1 (L) 03/25/2025    CO2 27 03/25/2025     03/25/2025    BUN 12 03/25/2025    CREATININE 0.91 03/25/2025       WBC trends are being monitored. Antibiotic doses are being adjusted per most recent renal labs.     Vitals:    03/25/25 1858   BP: 126/60   Pulse: 78   Resp: 18   Temp: 36.5 °C (97.7 °F)   SpO2: 91%     Obese patient no acute distress  No conversational dyspnea, no tachypnea, no cough  Neck supple  Heart S1-S2 but tachycardic at 102  Lungs bilaterally diminished but clear  Abdomen soft nondistended  Extremities no pain to palpation, no blisters  No oral blisters or evidence of thrush at the present time    Patient Active Problem List   Diagnosis    Abnormal weight gain    Acute non-recurrent maxillary sinusitis    Anxiety disorder    Atypical ductal hyperplasia of breast    Breast neoplasm, Tis (LCIS)    Dysfunction of right eustachian tube    Dyslipidemia    Hypertension    Migraine    Primary osteoarthritis of both knees    Vitamin D deficiency    Weight gain    Essential (primary) hypertension    Acute bacterial  sinusitis    Medicare annual wellness visit, subsequent    Adjustment disorder with anxious mood    Carpal tunnel syndrome, right upper limb    Body mass index (BMI) 40.0-44.9, adult (Multi)    Hyperglycemia    HEDY (acute kidney injury) (CMS-[facility])    Headache           ASSESSMENT:  Bacteremia 1 out of 2 gram-negative bacilli on prelim culture from 3/17/2025 that popped positive today  Sepsis with history of headaches, sore throat, oral lesions, transaminitis, bilateral ear pain with possible exposure to hand-foot-and-mouth disease at grandchildren's  vs herpes stomatitis      PLAN:    No evidence of meningitis   Ceftriaxone right now     CT noted, discussed with medical team    Urology involved, had pcnt   Await cultures, if negative cipro for 7 days      Tigre Hernandez MD

## 2025-03-26 NOTE — PROGRESS NOTES
Medical Group Progress Note  ASSESSMENT & PLAN:     Sepsis with metabolic encephalopathy and elevated LFTs  Proteus Mirabalis bacteremia  -source either UTI, given headache consideration for meningitis as well  -for now cont abx to cover meningitis including vanc, rocephin, ampicillin  -ID consulted  -tentative plan for LP with IR tomorrow mornign  -follow cultures      Intractable headache - resolved  Hx: migraine  Hx: anxiety  - CTA head neck showing developmental abnormality without evidence of infarction or dissection  - consult Neuro   - regular diet   - pain control  - telemetry overnight     HEDY  Hypokalemia    - Baseline SCr 0.85, on admission SCr 1.61, GFR 34  - Replace and recheck electrolytes     Transaminitis  - No complaints of abdominal pain monitor and trend,      - resume home meds once rec complete and clinically appropriate      VTE Prophylaxis: Heparin subcu    3/24/25  Remains clinically stable  No recurrent fever  Renal function back to baseline  Seen by urology, given size and location of stone planning for nephrostomy tube with PCNL at a later date  Likely observe overnight and discharge in am  Tentative plan for cipro at discharge per ID  Cont IV abx for now    3/25    Seen and examined   Clinically better   No fever Mild pain at site of nephrostomy tube  No leukocytosis  Blood cultures are negative   Fluid cultures are negative   S/p Nephrostomy tube   ID is following     URL is following   Rocephin IV daily       Bao Pace MD    SUBJECTIVE     Seen and examined   Clinically better   Mild pain   No fever       OBJECTIVE:     Last Recorded Vitals:  Vitals:    03/25/25 1858 03/26/25 0003 03/26/25 0330 03/26/25 0751   BP: 126/60 129/59 137/69 116/70   BP Location: Right arm Right arm Left arm Right arm   Patient Position: Lying Lying Lying Sitting   Pulse: 78 72 77 78   Resp: 18 18  18   Temp: 36.5 °C (97.7 °F) 36.9 °C (98.4 °F) 36.8 °C (98.2 °F) 36.8 °C (98.2 °F)   TempSrc: Temporal  Temporal Temporal Temporal   SpO2: 91% 93% 94% 92%   Weight:       Height:         Last I/O:  I/O last 3 completed shifts:  In: 920 (9.6 mL/kg) [P.O.:920]  Out: 2250 (23.5 mL/kg) [Urine:2250 (0.7 mL/kg/hr)]  Weight: 95.7 kg     Physical Exam  GEN: comfortable, appears stated age  HEENT: NCAT, PERRLA, EOMI, moist mucous membranes  NECK: supple, no masses  CV: RRR, no m/r/g, no LE edema  LUNGS: CTAB, no w/r/c  ABD: soft, NT, ND, NBS  SKIN: no rashes  MSK; no gross deformities, normal joints  NEURO: A+Ox3, no FND  PSYCH: appropriate mood, affect      Inpatient Medications:  aspirin, 81 mg, oral, Daily  [Held by provider] atorvastatin, 40 mg, oral, Daily  cefTRIAXone, 2 g, intravenous, q12h  heparin (porcine), 7,500 Units, subcutaneous, q8h ALVIN  melatonin, 5 mg, oral, Daily  metoprolol succinate XL, 50 mg, oral, Daily  nystatin, , Topical, BID  pantoprazole, 40 mg, oral, Daily    PRN Medications  PRN medications: acetaminophen **OR** acetaminophen **OR** acetaminophen, acetaminophen, benzocaine-menthol, benzonatate, guaiFENesin, ipratropium-albuteroL, lidocaine, ondansetron **OR** ondansetron, oxyCODONE  Continuous Medications:       LABS AND IMAGING:     Labs:  Results from last 7 days   Lab Units 03/26/25  0559 03/25/25  0538 03/22/25  0649   WBC AUTO x10*3/uL 8.9 9.8 11.9*   RBC AUTO x10*6/uL 3.24* 3.28* 3.04*   HEMOGLOBIN g/dL 9.8* 10.0* 9.3*   HEMATOCRIT % 29.9* 30.1* 26.8*   MCV fL 92 92 88   MCH pg 30.2 30.5 30.6   MCHC g/dL 32.8 33.2 34.7   RDW % 15.6* 15.9* 15.7*   PLATELETS AUTO x10*3/uL 464* 423 277     Results from last 7 days   Lab Units 03/26/25  0559 03/25/25  0538 03/23/25  0442 03/22/25  0650 03/21/25  0627   SODIUM mmol/L 139 141 139 137 137   POTASSIUM mmol/L 3.4* 3.1* 3.0* 4.8 3.9   CHLORIDE mmol/L 104 104 105 105 107   CO2 mmol/L 28 27 26 24 21   BUN mg/dL 13 12 17 16 19   CREATININE mg/dL 0.92 0.91 0.94 0.98 1.21*   GLUCOSE mg/dL 115* 109* 110* 117* 110*   PROTEIN TOTAL g/dL  --   --  6.2* 6.2* 6.5    CALCIUM mg/dL 8.4* 8.5* 8.2* 8.0* 8.3*   BILIRUBIN TOTAL mg/dL  --   --  0.5 0.5 0.5   ALK PHOS U/L  --   --  281* 232* 243*   AST U/L  --   --  46* 53* 56*   ALT U/L  --   --  54* 52* 53*     Results from last 7 days   Lab Units 03/26/25  0559 03/25/25  0538 03/20/25  0553   MAGNESIUM mg/dL 1.72 1.74 2.04     Results from last 7 days   Lab Units 03/19/25  1441   TROPHS ng/L 79*     Imaging:  peripheral IV bedside imaging  These images are not reportable by radiology and will not be interpreted   by  Radiologists.

## 2025-03-27 ENCOUNTER — PATIENT OUTREACH (OUTPATIENT)
Dept: PRIMARY CARE | Facility: CLINIC | Age: 70
End: 2025-03-27
Payer: MEDICARE

## 2025-03-27 LAB
BACTERIA FLD CULT: NORMAL
GRAM STN SPEC: NORMAL
GRAM STN SPEC: NORMAL

## 2025-03-27 NOTE — PROGRESS NOTES
Discharge Facility: [facility]  Discharge Diagnosis: HEDY  Admission Date: 3/18/25  Discharge Date: 3/26/25    PCP Appointment Date: 4/1/25  Specialist Appointment Date: Unknown  Hospital Encounter and Summary Linked: Yes    Discharge Summary by Bao Pace MD (03/26/2025 10:04)   See discharge assessment below for further details    Wrap Up  Wrap Up Additional Comments: This CM spoke with pt via phone. Pt reports doing well at home since discharge. New meds reviewed. Pt denies CP and SOB. Pt aware of my availability for non-emergent concerns. Contact info provided to patient (3/27/2025  9:13 AM)    Engagement  Call Start Time: 0911 (3/27/2025  9:13 AM)    Medications  Medications reviewed with patient/caregiver?: Yes (3/27/2025  9:13 AM)  Is the patient having any side effects they believe may be caused by any medication additions or changes?: No (3/27/2025  9:13 AM)  Does the patient have all medications ordered at discharge?: Yes (3/27/2025  9:13 AM)  Care Management Interventions: Provided patient education (3/27/2025  9:13 AM)  Prescription Comments: pt sent home with script (3/27/2025  9:13 AM)  Is the patient taking all medications as directed (includes completed medication regime)?: Yes (3/27/2025  9:13 AM)  Care Management Interventions: Provided patient education (3/27/2025  9:13 AM)  Medication Comments: Pt denies problems obtaining or affording medication (3/27/2025  9:13 AM)    Appointments  Does the patient have a primary care provider?: Yes (3/27/2025  9:13 AM)  Care Management Interventions: Verified appointment date/time/provider (3/27/2025  9:13 AM)  Has the patient kept scheduled appointments due by today?: Yes (3/27/2025  9:13 AM)  Care Management Interventions: Advised patient to keep appointment (3/27/2025  9:13 AM)    Self Management  What is the home health agency?: [facility] (3/27/2025  9:13 AM)  Has home health visited the patient within 72 hours of discharge?: No (3/27/2025   9:13 AM)    Patient Teaching  Does the patient have access to their discharge instructions?: Yes (3/27/2025  9:13 AM)  Care Management Interventions: Reviewed instructions with patient (3/27/2025  9:13 AM)  What is the patient's perception of their health status since discharge?: Improving (3/27/2025  9:13 AM)  Is the patient/caregiver able to teach back the hierarchy of who to call/visit for symptoms/problems? PCP, Specialist, Home Health nurse, Urgent Care, ED, 911: Yes (3/27/2025  9:13 AM)      Bebo Lujan LPN

## 2025-03-28 ENCOUNTER — HOME CARE VISIT (OUTPATIENT)
Dept: HOME HEALTH SERVICES | Facility: HOME HEALTH | Age: 70
End: 2025-03-28

## 2025-03-28 ENCOUNTER — HOME CARE VISIT (OUTPATIENT)
Dept: HOME HEALTH SERVICES | Facility: HOME HEALTH | Age: 70
End: 2025-03-28
Payer: MEDICARE

## 2025-03-28 VITALS
BODY MASS INDEX: 41.43 KG/M2 | HEIGHT: 60 IN | WEIGHT: 211 LBS | SYSTOLIC BLOOD PRESSURE: 120 MMHG | TEMPERATURE: 97.4 F | OXYGEN SATURATION: 96 % | RESPIRATION RATE: 20 BRPM | DIASTOLIC BLOOD PRESSURE: 72 MMHG | HEART RATE: 66 BPM

## 2025-03-28 PROCEDURE — G0299 HHS/HOSPICE OF RN EA 15 MIN: HCPCS

## 2025-03-28 ASSESSMENT — ENCOUNTER SYMPTOMS
HIGHEST PAIN SEVERITY IN PAST 24 HOURS: 4/10
CHANGE IN APPETITE: UNCHANGED
LOWEST PAIN SEVERITY IN PAST 24 HOURS: 3/10
PAIN SEVERITY GOAL: 0/10
APPETITE LEVEL: GOOD
DIZZINESS: 1
LAST BOWEL MOVEMENT: 67292

## 2025-03-28 ASSESSMENT — ACTIVITIES OF DAILY LIVING (ADL)
PHYSICAL TRANSFERS ASSESSED: 1
OASIS_M1830: 02
AMBULATION ASSISTANCE: 1
AMBULATION ASSISTANCE: STAND BY ASSIST
CURRENT_FUNCTION: STAND BY ASSIST
ENTERING_EXITING_HOME: MODERATE ASSIST

## 2025-03-31 ENCOUNTER — APPOINTMENT (OUTPATIENT)
Dept: UROLOGY | Facility: CLINIC | Age: 70
End: 2025-03-31
Payer: MEDICARE

## 2025-03-31 VITALS
HEIGHT: 60 IN | BODY MASS INDEX: 40.51 KG/M2 | DIASTOLIC BLOOD PRESSURE: 60 MMHG | SYSTOLIC BLOOD PRESSURE: 122 MMHG | HEART RATE: 71 BPM | WEIGHT: 206.35 LBS

## 2025-03-31 DIAGNOSIS — A41.9 SEPSIS, DUE TO UNSPECIFIED ORGANISM, UNSPECIFIED WHETHER ACUTE ORGAN DYSFUNCTION PRESENT (MULTI): ICD-10-CM

## 2025-03-31 DIAGNOSIS — N20.0 KIDNEY STONES: ICD-10-CM

## 2025-03-31 DIAGNOSIS — N20.1 CALCULUS OF URETER: Primary | ICD-10-CM

## 2025-03-31 LAB
POC APPEARANCE, URINE: CLEAR
POC BILIRUBIN, URINE: NEGATIVE
POC BLOOD, URINE: ABNORMAL
POC COLOR, URINE: YELLOW
POC GLUCOSE, URINE: NEGATIVE MG/DL
POC KETONES, URINE: NEGATIVE MG/DL
POC LEUKOCYTES, URINE: ABNORMAL
POC NITRITE,URINE: NEGATIVE
POC PH, URINE: 5.5 PH
POC PROTEIN, URINE: ABNORMAL MG/DL
POC SPECIFIC GRAVITY, URINE: >=1.03
POC UROBILINOGEN, URINE: 0.2 EU/DL

## 2025-03-31 PROCEDURE — 3008F BODY MASS INDEX DOCD: CPT

## 2025-03-31 PROCEDURE — 1123F ACP DISCUSS/DSCN MKR DOCD: CPT

## 2025-03-31 PROCEDURE — 1159F MED LIST DOCD IN RCRD: CPT

## 2025-03-31 PROCEDURE — 3078F DIAST BP <80 MM HG: CPT

## 2025-03-31 PROCEDURE — 1111F DSCHRG MED/CURRENT MED MERGE: CPT

## 2025-03-31 PROCEDURE — 1036F TOBACCO NON-USER: CPT

## 2025-03-31 PROCEDURE — G2211 COMPLEX E/M VISIT ADD ON: HCPCS

## 2025-03-31 PROCEDURE — 1160F RVW MEDS BY RX/DR IN RCRD: CPT

## 2025-03-31 PROCEDURE — 81003 URINALYSIS AUTO W/O SCOPE: CPT

## 2025-03-31 PROCEDURE — 99214 OFFICE O/P EST MOD 30 MIN: CPT

## 2025-03-31 PROCEDURE — 3074F SYST BP LT 130 MM HG: CPT

## 2025-03-31 NOTE — PATIENT INSTRUCTIONS
The name of your procedure is listed below:  Mini Percutaneous Nephrolithotomy (PCNL) with Ureteral Stent Placement (Treatment of Kidney Stones)    Surgeon: Dr. Oral Hatfield MD    Report to your nearest emergency department for fever/chills, uncontrollable pain or vomiting, dislodged or malfunctioning nephrostomy tube.    Call [phone] ([facility]) urology office for questions/concerns.

## 2025-03-31 NOTE — PROGRESS NOTES
Subjective   Patient ID: Beth Quinones is a 69 y.o. female who presents for Nephrolithiasis.    Patient presents with an obstructing left ureteral calculus s/p nephrostomy tube insertion with IR on 03/24/25.  Patient [place] went [place] [facility] [place]17 with an intractable headache, was found to be septic and admitted for further evaluation.  She had an LP for evaluation of possible meningitis which was negative.  Blood culture positive for proteus mirabilis.  CT a/p ordered and a 10 x 10 x 18 mm proximal left ureteral calculus with mild left hydronephrosis was found along with other nonobstructing left renal calculi.  HEDY and leukocytosis resolved during admission.  Urine culture was negative.  She remains on Cipro.  Patient here for mini PCNL discussion.    Patient is doing much better but still tired from hospital stay.  Reports occ discomfort with the PCNT but no true pain.  No dysuria.  No gross hematuria.  She's still urinating with the PCNT, no frequency or incontinence.  No n/v, fever, chills.  [facility] has been helping with mngt of her nephrostomy tube.  She reports some irritation vaginally and rectally, nurse thought she may have yeast infection from all the abx.  Patient is not overly bothered and going to discuss with PCP tomorrow.      Review of Systems  See HPI.    Objective   Physical Exam    Vitals:    03/31/25 1109   BP: 122/60   Pulse: 71     Alert and oriented x3  No acute distress, cooperative  Breathes easily on room air  Normal range of motion  No focal neurological deficits  Appears stated age  No erythema or abnormal drainage surrounding nephrostomy dressing  : left nephrostomy tube to CD with urine yellow clear    Results for orders placed or performed in visit on 03/31/25 (from the past 24 hours)   POCT UA Automated manually resulted   Result Value Ref Range    POC Color, Urine Yellow Straw, Yellow, Light-Yellow    POC Appearance, Urine Clear Clear    POC Glucose, Urine NEGATIVE NEGATIVE mg/dl     POC Bilirubin, Urine NEGATIVE NEGATIVE    POC Ketones, Urine NEGATIVE NEGATIVE mg/dl    POC Specific Gravity, Urine >=1.030 1.005 - 1.035    POC Blood, Urine SMALL (1+) (A) NEGATIVE    POC PH, Urine 5.5 No Reference Range Established PH    POC Protein, Urine 30 (1+) (A) NEGATIVE mg/dl    POC Urobilinogen, Urine 0.2 0.2, 1.0 EU/DL    Poc Nitrite, Urine NEGATIVE NEGATIVE    POC Leukocytes, Urine SMALL (1+) (A) NEGATIVE     Assessment/Plan   Diagnoses and all orders for this visit:  Calculus of ureter        - Plan for mini PCNL with Dr. Hatfield  Kidney stones  -     POCT UA Automated manually resulted  - Plan for mini PCNL with Dr. Hatfield  Sepsis, due to unspecified organism, unspecified whether acute organ dysfunction present (Multi)        - Resolved, finish abx    Patient with large left obstructing ureteral stone and other left renal calculi s/p left nephrostomy tube insertion.  Patient to be scheduled for further intervention with a left mini percutaneous nephrolithotomy (PCNL) with ureteral stent insertion with Dr. Hatfield at [facility].  Risks, benefits, and alternatives were discussed including but not limited to bleeding requiring transfusion or interventional radiology procedure, injury to surrounding structures including intestine and lung, post-operative pain, urinary stent discomfort, and need for overnight hospitalization in some cases.  Discussed post-operative restrictions including no heavy lifting over 25 pounds for 2 weeks.  Patient has dressing change scheduled with IR on 04/07.  Discussed that she will need pre-operative labs that Dr. Hatfield's team will order.  No previous issues with general anesthesia.  Discussed that ureteral stent is temporary and will need removed in the office 1-4 wks after surgery.  She will report to ED if fever/chills, uncontrollable pain/vomiting, dislodged or malfunctioning PCNT.  Patient agrees with plan and all questions answered.    Gisselle Ayala,  MARIANA 03/31/25 11:08 AM

## 2025-04-01 ENCOUNTER — TELEPHONE (OUTPATIENT)
Dept: UROLOGY | Facility: CLINIC | Age: 70
End: 2025-04-01

## 2025-04-01 ENCOUNTER — HOME CARE VISIT (OUTPATIENT)
Dept: HOME HEALTH SERVICES | Facility: HOME HEALTH | Age: 70
End: 2025-04-01
Payer: MEDICARE

## 2025-04-01 ENCOUNTER — APPOINTMENT (OUTPATIENT)
Dept: PRIMARY CARE | Facility: CLINIC | Age: 70
End: 2025-04-01
Payer: MEDICARE

## 2025-04-01 DIAGNOSIS — D50.0 IRON DEFICIENCY ANEMIA DUE TO CHRONIC BLOOD LOSS: ICD-10-CM

## 2025-04-01 DIAGNOSIS — N20.0 LEFT RENAL STONE: ICD-10-CM

## 2025-04-01 DIAGNOSIS — N12 PYELONEPHRITIS: Primary | ICD-10-CM

## 2025-04-01 DIAGNOSIS — S37.002D INJURY OF LEFT KIDNEY, SUBSEQUENT ENCOUNTER: ICD-10-CM

## 2025-04-01 DIAGNOSIS — N30.90 CYSTITIS: ICD-10-CM

## 2025-04-01 DIAGNOSIS — I10 ESSENTIAL (PRIMARY) HYPERTENSION: ICD-10-CM

## 2025-04-01 DIAGNOSIS — B37.31 YEAST VAGINITIS: ICD-10-CM

## 2025-04-01 DIAGNOSIS — E78.5 DYSLIPIDEMIA: ICD-10-CM

## 2025-04-01 LAB
POC APPEARANCE, URINE: CLEAR
POC BILIRUBIN, URINE: NEGATIVE
POC BLOOD, URINE: ABNORMAL
POC COLOR, URINE: YELLOW
POC GLUCOSE, URINE: NEGATIVE MG/DL
POC KETONES, URINE: NEGATIVE MG/DL
POC LEUKOCYTES, URINE: ABNORMAL
POC NITRITE,URINE: NEGATIVE
POC PH, URINE: 6 PH
POC PROTEIN, URINE: ABNORMAL MG/DL
POC SPECIFIC GRAVITY, URINE: >=1.03
POC UROBILINOGEN, URINE: 0.2 EU/DL

## 2025-04-01 PROCEDURE — 3078F DIAST BP <80 MM HG: CPT | Performed by: FAMILY MEDICINE

## 2025-04-01 PROCEDURE — 1036F TOBACCO NON-USER: CPT | Performed by: FAMILY MEDICINE

## 2025-04-01 PROCEDURE — 81003 URINALYSIS AUTO W/O SCOPE: CPT | Performed by: FAMILY MEDICINE

## 2025-04-01 PROCEDURE — 3074F SYST BP LT 130 MM HG: CPT | Performed by: FAMILY MEDICINE

## 2025-04-01 PROCEDURE — 99496 TRANSJ CARE MGMT HIGH F2F 7D: CPT | Performed by: FAMILY MEDICINE

## 2025-04-01 PROCEDURE — 1111F DSCHRG MED/CURRENT MED MERGE: CPT | Performed by: FAMILY MEDICINE

## 2025-04-01 PROCEDURE — 1159F MED LIST DOCD IN RCRD: CPT | Performed by: FAMILY MEDICINE

## 2025-04-01 PROCEDURE — 3008F BODY MASS INDEX DOCD: CPT | Performed by: FAMILY MEDICINE

## 2025-04-01 PROCEDURE — 1160F RVW MEDS BY RX/DR IN RCRD: CPT | Performed by: FAMILY MEDICINE

## 2025-04-01 PROCEDURE — 1123F ACP DISCUSS/DSCN MKR DOCD: CPT | Performed by: FAMILY MEDICINE

## 2025-04-01 RX ORDER — FLUCONAZOLE 100 MG/1
100 TABLET ORAL DAILY
Qty: 3 TABLET | Refills: 0 | Status: SHIPPED | OUTPATIENT
Start: 2025-04-01 | End: 2025-04-04

## 2025-04-01 NOTE — TELEPHONE ENCOUNTER
Called and left a message that patient will be scheduled for her upcoming procedure with Dr. Hatfield on 5-15 at [facility]. Surgery information will be sent through her chart and left number for her to confirm the date.

## 2025-04-01 NOTE — PROGRESS NOTES
Subjective   Patient ID: Beth Quinones is a 69 y.o. female who presents for Hospital Follow-up (Patient was seen at [facility] from 03/17/2025 to 03/26/2025 for HEDY, Proteus Mirabalis bacteremia).  HPI  Pleasant 69-year-old female with a history of hypertension dyslipidemia was admitted because of increasing fatigue nausea vomiting hypokalemia and acute kidney injury back on 317 ultrasound of the abdomen CAT scan of the abdomen did show a left kidney stone and with subsequent blood culture showed Proteus.  She was on IV antibiotics x 2 and her kidney functions improved dramatically and repeat blood cultures were also negative.  Her potassium was replaced and is due for repeat potassium.  Also post sepsis anemia and this needs to be rechecked as well she does take multivitamin with iron at home.  She is scheduled for lithotripsy soon and at least has no striking abdominal pain has a nephrostomy tube draining at this time no high fever shaking chills notably  No chest pain or shortness of breath  Does take her usual beta-blocker for hypertension and statin for dyslipidemia.  We did check her urine today which shows only faint blood pyuria and will be rechecking her urine culture.  She was discharged on Cipro on 26 March.  She does have a lot of vaginal itching and will hold her statin for 1 week while we give her 100 g 3 times week of Diflucan.  Otherwise energy returning.  Gratefully no fever chills.  Aware of need to address the left kidney stone to prevent future infections.  In house x-rays as well as lab reviewed in detail need to follow-up above lab work as ordered and discussed today  Review of Systems   Constitutional:  Negative for fatigue and fever.   Eyes:  Negative for visual disturbance.   Respiratory:  Negative for cough, chest tightness and shortness of breath.    Cardiovascular:  Negative for chest pain, palpitations and leg swelling.   Gastrointestinal:  Negative for abdominal  distention, abdominal pain, blood in stool, constipation and vomiting.   Genitourinary:  Positive for flank pain (Left nephrostomy tube no apparent hematuria), urgency and vaginal discharge. Negative for difficulty urinating, dysuria, frequency and hematuria.   Musculoskeletal:  Positive for arthralgias and back pain. Negative for myalgias.   Skin:  Negative for rash.   Neurological:  Positive for weakness. Negative for syncope, light-headedness and headaches.   Psychiatric/Behavioral:  Negative for behavioral problems, confusion, decreased concentration, sleep disturbance and suicidal ideas.        Objective   /70 (BP Location: Right arm, Patient Position: Sitting, BP Cuff Size: Large adult)   Pulse 95   Temp 35.9 °C (96.6 °F) (Temporal)   Resp 16   Ht 1.524 m (5')   Wt 91.2 kg (201 lb)   SpO2 96%   BMI 39.26 kg/m²          Component  Ref Range & Units 09:57 1 d ago   POC Color, Urine  Straw, Yellow, Light-Yellow Yellow Yellow   POC Appearance, Urine  Clear Clear Clear   POC Glucose, Urine  NEGATIVE mg/dl NEGATIVE NEGATIVE   POC Bilirubin, Urine  NEGATIVE NEGATIVE NEGATIVE   POC Ketones, Urine  NEGATIVE mg/dl NEGATIVE NEGATIVE   POC Specific Gravity, Urine  1.005 - 1.035 >=1.030 >=1.030   POC Blood, Urine  NEGATIVE SMALL (1+) Abnormal  SMALL (1+) Abnormal    POC PH, Urine  No Reference Range Established PH 6.0 5.5   POC Protein, Urine  NEGATIVE mg/dl 30 (1+) Abnormal  30 (1+) Abnormal    POC Urobilinogen, Urine  0.2, 1.0 EU/DL 0.2 0.2   Poc Nitrite, Urine  NEGATIVE NEGATIVE NEGATIVE   POC Leukocytes, Urine  NEGATIVE SMALL (1+) Abnormal  SMALL (1+) Abnormal              Specimen Collected: 04/01/25 09:57 Last Resulted: 04/01/25 09:57   o Differential  Order: [id number]   Status: Final result       Visible to patient: Yes (not seen)    0 Result Notes            Component  Ref Range & Units 11 d ago  (3/26/25) 12 d ago  (3/25/25) 2 wk ago  (3/22/25) 2 wk ago  (3/21/25) 2 wk ago  (3/20/25) 2 wk  ago  (3/19/25) 2 wk ago  (3/18/25)   WBC  4.4 - 11.3 x10*3/uL 8.9 9.8 11.9 High  16.1 High  24.2 High  32.1 High  12.0 High    nRBC  0.0 - 0.0 /100 WBCs 0.0 0.0 0.0 0.0 0.0 0.0 0.0   RBC  4.00 - 5.20 x10*6/uL 3.24 Low  3.28 Low  3.04 Low  3.39 Low  3.31 Low  3.42 Low  3.29 Low    Hemoglobin  12.0 - 16.0 g/dL 9.8 Low  10.0 Low  9.3 Low  10.3 Low  10.2 Low  10.7 Low  10.3 Low    Hematocrit  36.0 - 46.0 % 29.9 Low  30.1 Low  26.8 Low  29.9 Low  29.2 Low  30.2 Low  29.8 Low    MCV  80 - 100 fL 92 92 88 88 88 88 91   MCH  26.0 - 34.0 pg 30.2 30.5 30.6 30.4 30.8 31.3 31.3   MCHC  32.0 - 36.0 g/dL 32.8 33.2 34.7 34.4 34.9 35.4 34.6   RDW  11.5 - 14.5 % 15.6 High  15.9 High  15.7 High  15.6 High  15.4 High  14.9 High  14.6 High    Platelets  150 - 450 x10*3/uL 464 High  423 277 288 245 222 159   Neutrophils %  40.0 - 80.0 % 74.2            lic Panel  Order: [id number]   Status: Final result       Visible to patient: Yes (not seen)    0 Result Notes            Component  Ref Range & Units 11 d ago  (3/26/25) 12 d ago  (3/25/25) 2 wk ago  (3/23/25) 2 wk ago  (3/22/25) 2 wk ago  (3/21/25) 2 wk ago  (3/20/25) 2 wk ago  (3/19/25)   Glucose  74 - 99 mg/dL 115 High  109 High  110 High  117 High  110 High  115 High  99   Sodium  136 - 145 mmol/L 139 141 139 137 137 136 137   Potassium  3.5 - 5.3 mmol/L 3.4 Low  3.1 Low  3.0 Low  4.8 CM 3.9 CM 3.1 Low  3.1 Low    Chloride  98 - 107 mmol/L 104 104 105 105 107 104 106   Bicarbonate  21 - 32 mmol/L 28 27 26 24 21 22 20 Low    Anion Gap  10 - 20 mmol/L 10 13 11 13 13 13 14   Urea Nitrogen  6 - 23 mg/dL 13 12 17 16 19 23 27 High    Creatinine  0.50 - 1.05 mg/dL 0.92 0.91 0.94 0.98 1.21 High  1.46 High  1.89 High    eGFR  >60 mL/min/1.73m*2 67 68 CM 65 CM 62 CM 48 Low  CM 39 Low  CM 28 Low  CM   Comment: Calculations of estimated GFR are performed using the 2021 CKD-EPI Study Refit equation without the race variable for the IDMS-Traceable creatinine  methods.  https://jasn.asnjournals.org/content/early/2021/09/22/ASN.8201804537   Calcium  8.6 - 10.3 mg/dL 8.4 Low  8.5 Low  8.2 Low  8.0 Low  8.3 Low  8.2 Low  8.1 Low    Resulting Agency [facility]             Specimen Collected: 03/26/25 05:59 Last Resulted: 03/26/25 06:43         ulture Proteus mirabilis Abnormal    BLOOD CULTURE BOTTLE Positive Anaerobic Bottle            Gram Stain  Panic   Gram negative bacilli   Anaerobic Bottle Positive           Resulting Agency: [facility]     Susceptibility     Proteus mirabilis     MICROSCAN    Ampicillin Susceptible    Cefazolin Susceptible    Ciprofloxacin Susceptible    Gentamicin Susceptible    Levofloxacin Susceptible    Piperacillin/Tazobactam Susceptible    Tetracycline Resistant    Trimethoprim/Sulfamethoxazole Susceptible              Linear View      s was performed.  Standard contiguous  axial images were obtained at 3 mm slice thickness through the  abdomen and pelvis. Coronal and sagittal reconstructions at 3 mm  slice thickness were performed.  75 ML of Omnipaque 350 was  administered intravenously without immediate complication.      FINDINGS:  LOWER CHEST:  Mild right lower lobe subsegmental atelectasis or scarring.      ABDOMEN:      LIVER:  The liver is normal in size without evidence of focal lesions.      BILE DUCTS:  The intrahepatic and extrahepatic ducts are not dilated.      GALLBLADDER:  Cholecystectomy clips.      PANCREAS:  Within normal limits.      SPLEEN:  Normal size and homogeneous.      ADRENAL GLANDS:  Within normal limits.      KIDNEYS AND URETERS:  10 mm axial diameter and 18 mm tall very proximal (superior) left  ureteral calcification at the L3 level. Mild left hydronephrosis  without perinephric stranding. Multiple calyceal and infundibular  lower left renal calcifications measure up to almost 1 cm. Normal  size kidneys without a mass or right hydroureteronephrosis.      PELVIS:      BLADDER:  Collapsed precluding  evaluation for mucosal lesions. No calcified  stone.      REPRODUCTIVE ORGANS:  Status post hysterectomy. The ovaries can not be identified.      BOWEL:  The stomach is unremarkable.   Moderate amount of stool without bowel  distention. Almost gasless small bowel. Normal retrocecal appendix.      VESSELS:  The aorta and IVC appear normal. Minimal vascular calcification.      PERITONEUM/RETROPERITONEUM/LYMPH NODES:  No ascites, free air or fluid collection.  No abdominopelvic  lymphadenopathy.      BONES AND ABDOMINAL WALL:  Marked, extensive spinal degenerative changes include very severe  bilateral hypertrophic L4-5 facet joint DJD accounting for minimal  spondylolisthesis at this level. Slight scoliosis. Bilateral  sacroiliac joint DJD. Diastasis recti.      IMPRESSION:  1. 10 x 10 x 18 mm proximal left ureteral calculus with mild left  hydronephrosis.  2. Left renal calculi.      MACRO:  None      Physical Exam  Vital sign reviewed and normal pulse ox good blood pressure good no fever  General inspection feels a pale otherwise nonjudgment nontoxic individual no distress  Neck neck is supple no mass adenopathy bruits or rigidity  Cardiovascular RSR with soft grade 1/2 over 6 systolic extra murmur which is usual.  No gallop no ectopy  Pulmonary chest is clear anteriorly and posteriorly  Abdominal left flank nephrostomy tube draining clear urine otherwise no rebound tenderness or tenderness of the left upper or left lower abdomen.  Sounds are normal  Skin no rashes petechiae or jaundice  Peripheral vascular no symmetric asymmetric edema as usual in the medial aspect of both knees.  Mood mood is stable appropriate without significant anxiety depression or cognitive issue  In-house lab x-rays reviewed in detail      Assessment/Plan   Problem List Items Addressed This Visit       Dyslipidemia    Essential (primary) hypertension    Cystitis    Relevant Orders    POCT UA Automated manually resulted (Completed)    Urine  Culture (Completed)    CBC and Auto Differential    Basic metabolic panel    Left renal stone    Pyelonephritis - Primary    Relevant Orders    POCT UA Automated manually resulted (Completed)    Urine Culture (Completed)    Yeast vaginitis    Iron deficiency anemia due to chronic blood loss    Relevant Orders    CBC and Auto Differential     Other Visit Diagnoses       Injury of left kidney, subsequent encounter        Relevant Orders    Basic metabolic panel        Is finishing her Cipro for pyelonephritis and we did check her urine with very mild hematuria pyuria and will repeat her urine culture today to follow-up potassium as well as kidney function thankfully her creatinine was normal at time of discharge  With post sepsis anemia will be rechecking her CBC as well as BMP in 1 week.  Increasing liquid strongly urged follow-up with her urologist for potential future lithotripsy.  Continue antihypertensive medicine as well as her low-fat diet  Will give Diflucan 100 mg for 3 days and will hold her statin therapy for 1 week  Follow-up in 2 weeks reviewed the above all questions were addressed certainly increase liquids strongly urged to continue adding her iron to her multivitamin and will follow-up for hemoglobin and potassium soon aware of ER parameters if any high fever shaking chills develop  @discharge  The above diagnosis and treatment plan was discussed with the patient patient will continue appropriate diet and exercise as reviewed  Patient will recheck earlier if any interval problems of significance or clinical worsening of the above problems.  Agrees above surveillance.  All question were addressed regarding above meds

## 2025-04-03 LAB — BACTERIA UR CULT: ABNORMAL

## 2025-04-06 VITALS
SYSTOLIC BLOOD PRESSURE: 116 MMHG | HEART RATE: 95 BPM | WEIGHT: 201 LBS | BODY MASS INDEX: 39.46 KG/M2 | DIASTOLIC BLOOD PRESSURE: 70 MMHG | RESPIRATION RATE: 16 BRPM | TEMPERATURE: 96.6 F | OXYGEN SATURATION: 96 % | HEIGHT: 60 IN

## 2025-04-06 PROBLEM — B37.31 YEAST VAGINITIS: Status: ACTIVE | Noted: 2025-04-06

## 2025-04-06 PROBLEM — S37.002A INJURY OF LEFT KIDNEY: Status: ACTIVE | Noted: 2025-04-06

## 2025-04-06 PROBLEM — N30.90 CYSTITIS: Status: ACTIVE | Noted: 2025-04-06

## 2025-04-06 PROBLEM — D50.0 IRON DEFICIENCY ANEMIA DUE TO CHRONIC BLOOD LOSS: Status: ACTIVE | Noted: 2025-04-06

## 2025-04-06 PROBLEM — N20.0 LEFT RENAL STONE: Status: ACTIVE | Noted: 2025-04-06

## 2025-04-06 PROBLEM — N12 PYELONEPHRITIS: Status: ACTIVE | Noted: 2025-04-06

## 2025-04-06 ASSESSMENT — ENCOUNTER SYMPTOMS
BLOOD IN STOOL: 0
CHEST TIGHTNESS: 0
DIFFICULTY URINATING: 0
FLANK PAIN: 1
HEMATURIA: 0
ABDOMINAL DISTENTION: 0
PALPITATIONS: 0
SHORTNESS OF BREATH: 0
VOMITING: 0
WEAKNESS: 1
BACK PAIN: 1
SLEEP DISTURBANCE: 0
COUGH: 0
ABDOMINAL PAIN: 0
FEVER: 0
FREQUENCY: 0
CONSTIPATION: 0
ARTHRALGIAS: 1
LIGHT-HEADEDNESS: 0
HEADACHES: 0
DYSURIA: 0
CONFUSION: 0
DECREASED CONCENTRATION: 0
MYALGIAS: 0
FATIGUE: 0

## 2025-04-07 ENCOUNTER — HOSPITAL ENCOUNTER (OUTPATIENT)
Dept: RADIOLOGY | Facility: HOSPITAL | Age: 70
Discharge: HOME | End: 2025-04-07
Payer: MEDICARE

## 2025-04-07 DIAGNOSIS — Z93.6 NEPHROSTOMY PRESENT: ICD-10-CM

## 2025-04-08 ENCOUNTER — HOME CARE VISIT (OUTPATIENT)
Dept: HOME HEALTH SERVICES | Facility: HOME HEALTH | Age: 70
End: 2025-04-08
Payer: MEDICARE

## 2025-04-08 ENCOUNTER — PATIENT OUTREACH (OUTPATIENT)
Dept: PRIMARY CARE | Facility: CLINIC | Age: 70
End: 2025-04-08
Payer: MEDICARE

## 2025-04-08 LAB
ANION GAP SERPL CALCULATED.4IONS-SCNC: 13 MMOL/L (CALC) (ref 7–17)
BASOPHILS # BLD AUTO: 100 CELLS/UL (ref 0–200)
BASOPHILS NFR BLD AUTO: 1.7 %
BUN SERPL-MCNC: 15 MG/DL (ref 7–25)
BUN/CREAT SERPL: 14 (CALC) (ref 6–22)
CALCIUM SERPL-MCNC: 8.9 MG/DL (ref 8.6–10.4)
CHLORIDE SERPL-SCNC: 103 MMOL/L (ref 98–110)
CO2 SERPL-SCNC: 25 MMOL/L (ref 20–32)
CREAT SERPL-MCNC: 1.08 MG/DL (ref 0.6–1)
EGFRCR SERPLBLD CKD-EPI 2021: 55 ML/MIN/1.73M2
EOSINOPHIL # BLD AUTO: 531 CELLS/UL (ref 15–500)
EOSINOPHIL NFR BLD AUTO: 9 %
ERYTHROCYTE [DISTWIDTH] IN BLOOD BY AUTOMATED COUNT: 13.6 % (ref 11–15)
GLUCOSE SERPL-MCNC: 108 MG/DL (ref 65–99)
HCT VFR BLD AUTO: 35.7 % (ref 35–45)
HGB BLD-MCNC: 11.4 G/DL (ref 11.7–15.5)
LYMPHOCYTES # BLD AUTO: 2053 CELLS/UL (ref 850–3900)
LYMPHOCYTES NFR BLD AUTO: 34.8 %
MCH RBC QN AUTO: 29.9 PG (ref 27–33)
MCHC RBC AUTO-ENTMCNC: 31.9 G/DL (ref 32–36)
MCV RBC AUTO: 93.7 FL (ref 80–100)
MONOCYTES # BLD AUTO: 360 CELLS/UL (ref 200–950)
MONOCYTES NFR BLD AUTO: 6.1 %
NEUTROPHILS # BLD AUTO: 2856 CELLS/UL (ref 1500–7800)
NEUTROPHILS NFR BLD AUTO: 48.4 %
PLATELET # BLD AUTO: 367 THOUSAND/UL (ref 140–400)
PMV BLD REES-ECKER: 9.9 FL (ref 7.5–12.5)
POTASSIUM SERPL-SCNC: 3.9 MMOL/L (ref 3.5–5.3)
RBC # BLD AUTO: 3.81 MILLION/UL (ref 3.8–5.1)
SODIUM SERPL-SCNC: 141 MMOL/L (ref 135–146)
WBC # BLD AUTO: 5.9 THOUSAND/UL (ref 3.8–10.8)

## 2025-04-08 NOTE — PROGRESS NOTES
Call regarding appt. with PCP on (4/1/25) after hospitalization.  At time of outreach call the patient feels as if their condition has (improved) since last visit.  Reviewed the PCP appointment with the pt and addressed any questions or concerns.      Bebo Lujan LPN

## 2025-04-09 DIAGNOSIS — N20.0 LEFT NEPHROLITHIASIS: Primary | ICD-10-CM

## 2025-04-09 DIAGNOSIS — R82.90 ABNORMAL URINALYSIS: ICD-10-CM

## 2025-04-09 DIAGNOSIS — R79.1 ABNORMAL COAGULATION PROFILE: ICD-10-CM

## 2025-04-09 RX ORDER — ACETAMINOPHEN 325 MG/1
975 TABLET ORAL ONCE
OUTPATIENT
Start: 2025-04-09 | End: 2025-04-09

## 2025-04-10 ENCOUNTER — HOME CARE VISIT (OUTPATIENT)
Dept: HOME HEALTH SERVICES | Facility: HOME HEALTH | Age: 70
End: 2025-04-10
Payer: MEDICARE

## 2025-04-10 ASSESSMENT — ENCOUNTER SYMPTOMS
PERSON REPORTING PAIN: PATIENT
DENIES PAIN: 1

## 2025-04-10 ASSESSMENT — ACTIVITIES OF DAILY LIVING (ADL)
OASIS_M1830: 00
HOME_HEALTH_OASIS: 00

## 2025-04-11 ENCOUNTER — APPOINTMENT (OUTPATIENT)
Dept: PRIMARY CARE | Facility: CLINIC | Age: 70
End: 2025-04-11
Payer: MEDICARE

## 2025-04-11 VITALS
WEIGHT: 202 LBS | TEMPERATURE: 96.6 F | BODY MASS INDEX: 39.66 KG/M2 | HEIGHT: 60 IN | OXYGEN SATURATION: 97 % | SYSTOLIC BLOOD PRESSURE: 124 MMHG | DIASTOLIC BLOOD PRESSURE: 72 MMHG | HEART RATE: 72 BPM | RESPIRATION RATE: 16 BRPM

## 2025-04-11 DIAGNOSIS — N12 PYELONEPHRITIS: Primary | ICD-10-CM

## 2025-04-11 LAB
POC APPEARANCE, URINE: CLEAR
POC BILIRUBIN, URINE: NEGATIVE
POC BLOOD, URINE: ABNORMAL
POC COLOR, URINE: YELLOW
POC GLUCOSE, URINE: NEGATIVE MG/DL
POC KETONES, URINE: NEGATIVE MG/DL
POC LEUKOCYTES, URINE: ABNORMAL
POC NITRITE,URINE: NEGATIVE
POC PH, URINE: 6.5 PH
POC PROTEIN, URINE: ABNORMAL MG/DL
POC SPECIFIC GRAVITY, URINE: 1.01
POC UROBILINOGEN, URINE: 0.2 EU/DL

## 2025-04-11 PROCEDURE — 1160F RVW MEDS BY RX/DR IN RCRD: CPT | Performed by: FAMILY MEDICINE

## 2025-04-11 PROCEDURE — 1159F MED LIST DOCD IN RCRD: CPT | Performed by: FAMILY MEDICINE

## 2025-04-11 PROCEDURE — 1123F ACP DISCUSS/DSCN MKR DOCD: CPT | Performed by: FAMILY MEDICINE

## 2025-04-11 PROCEDURE — 3008F BODY MASS INDEX DOCD: CPT | Performed by: FAMILY MEDICINE

## 2025-04-11 PROCEDURE — 3074F SYST BP LT 130 MM HG: CPT | Performed by: FAMILY MEDICINE

## 2025-04-11 PROCEDURE — 99213 OFFICE O/P EST LOW 20 MIN: CPT | Performed by: FAMILY MEDICINE

## 2025-04-11 PROCEDURE — 1036F TOBACCO NON-USER: CPT | Performed by: FAMILY MEDICINE

## 2025-04-11 PROCEDURE — 1111F DSCHRG MED/CURRENT MED MERGE: CPT | Performed by: FAMILY MEDICINE

## 2025-04-11 PROCEDURE — 3078F DIAST BP <80 MM HG: CPT | Performed by: FAMILY MEDICINE

## 2025-04-11 PROCEDURE — 81003 URINALYSIS AUTO W/O SCOPE: CPT | Performed by: FAMILY MEDICINE

## 2025-04-11 RX ORDER — SULFAMETHOXAZOLE AND TRIMETHOPRIM 800; 160 MG/1; MG/1
1 TABLET ORAL 2 TIMES DAILY
Qty: 14 TABLET | Refills: 0 | Status: SHIPPED | OUTPATIENT
Start: 2025-04-11 | End: 2025-04-18 | Stop reason: SDUPTHER

## 2025-04-11 NOTE — PROGRESS NOTES
Subjective   Patient ID: Beth Quinones is a 69 y.o. female who presents for Pyelonephritis (1 week follow up ).  HPI  Pleasant 69-year-old here to recheck her earlier abnormal urine was discharged by March 26 after sepsis from a pyelonephritis and has a left nephrostomy tube in place she has no fever or chills and her urine culture last week came back positive only for yeast she was placed on Diflucan over the phone and otherwise is much less itching and no vaginal discharge thankfully no cloudiness of the urine no hematuria and very little flank pain around her nephrostomy tube draining clear urine.  Otherwise remains on her beta-blocker as well as her statin therapy no chest pain short of the palpitations specifically no fever chills recently.  She has a lithotripsy scheduled in about 2 weeks.  Wise no chest pain surgeon  Review of Systems   Constitutional:  Negative for fatigue, fever and unexpected weight change.   Eyes:  Negative for visual disturbance.   Respiratory:  Negative for cough, chest tightness, shortness of breath and wheezing.    Cardiovascular:  Negative for chest pain, palpitations and leg swelling.   Gastrointestinal:  Negative for abdominal distention, abdominal pain, blood in stool, constipation, diarrhea and nausea.   Genitourinary:  Positive for flank pain. Negative for dysuria, frequency, hematuria, urgency and vaginal pain.   Musculoskeletal:  Positive for arthralgias. Negative for myalgias.   Skin:  Negative for rash.   Neurological:  Negative for syncope, weakness, light-headedness, numbness and headaches.   Psychiatric/Behavioral:  Negative for behavioral problems, confusion, sleep disturbance and suicidal ideas.        Objective   /72 (BP Location: Right arm, Patient Position: Sitting, BP Cuff Size: Large adult)   Pulse 72   Temp 35.9 °C (96.6 °F) (Temporal)   Resp 16   Ht 1.524 m (5')   Wt 91.6 kg (202 lb)   SpO2 97%   BMI 39.45 kg/m²   CONCLUSIONS:   1. The left  ventricular systolic function is low normal, with a visually estimated ejection fraction of 50-55%.   2. No regional wall motion abnormalities.   3. Spectral Doppler shows a Grade I (impaired relaxation pattern) of left ventricular diastolic filling with normal left atrial filling pressure.   4. Trivial tricuspid regurgitation with estimated RVSP 21 mmHg.   5. No previous available for comparison.     QUANTITATIVE DATA SUMMARY:           Component  Ref Range & Units 09:33 10 d ago 11 d ago   POC Color, Urine  Straw, Yellow, Light-Yellow Yellow Yellow Yellow   POC Appearance, Urine  Clear Clear Clear Clear   POC Glucose, Urine  NEGATIVE mg/dl NEGATIVE NEGATIVE NEGATIVE   POC Bilirubin, Urine  NEGATIVE NEGATIVE NEGATIVE NEGATIVE   POC Ketones, Urine  NEGATIVE mg/dl NEGATIVE NEGATIVE NEGATIVE   POC Specific Gravity, Urine  1.005 - 1.035 1.015 >=1.030 >=1.030   POC Blood, Urine  NEGATIVE SMALL (1+) Abnormal  SMALL (1+) Abnormal  SMALL (1+) Abnormal    POC PH, Urine  No Reference Range Established PH 6.5 6.0 5.5   POC Protein, Urine  NEGATIVE mg/dl TRACE Abnormal  30 (1+) Abnormal  30 (1+) Abnormal    POC Urobilinogen, Urine  0.2, 1.0 EU/DL 0.2 0.2 0.2   Poc Nitrite, Urine  NEGATIVE NEGATIVE NEGATIVE NEGATIVE   POC Leukocytes, Urine  NEGATIVE LARGE (3+) Abnormal  SMALL (1+) Abnormal  SMALL (1+) Abnormal              Specimen Collected: 04/11/25 09:33 Last Resulted: 04/11/25 09:33     Order: [id number]   Collected 3/17/2025 23:05       Status: Final result       Visible to patient: Yes (not seen)    Specimen Information: Peripheral Venipuncture; Blood culture   0 Result Notes  Blood Culture Proteus mirabilis Abnormal    BLOOD CULTURE BOTTLE Positive Anaerobic Bottle            Gram Stain  Panic   Gram negative bacilli   Anaerobic Bottle Positive           Resulting Agency: [facility]     Susceptibility     Proteus mirabilis     MICROSCAN    Ampicillin Susceptible    Cefazolin Susceptible    Ciprofloxacin Susceptible     Gentamicin Susceptible    Levofloxacin Susceptible    Piperacillin/Tazobactam Susceptible    Tetracycline Resistant    Trimethoprim/Sulfamethoxazole Susceptible              Linear View        Specimen Collected: 03/17/25 23:05 Last Resulted: 03/22/25 12:25         Physical Exam  Vital signs are normal specifically patient afebrile blood pressure normal  General inspection reveals a pleasant interactive nontoxic individual in no distress  Chest chest is clear anteriorly posteriorly  Cardiovascular usual grade 1/2 of a 6 systolic murmur otherwise no diastolic murmurs, gallop or ectopy  Chest chest is clear  Abdomen left flank nephrostomy tube draining clear urine otherwise no hypogastric tenderness no left lower quadrant tenderness and abdominal exam is essentially benign no suprapubic fullness or pain no distention bowel sounds are normal right flank is unremarkable  Peripheral vascular no symmetric asymmetric edema distal pulses are intact  Skin no new rashes petechiae or jaundice        Assessment/Plan   Problem List Items Addressed This Visit       Pyelonephritis    Relevant Orders    POCT UA Automated manually resulted   Patient is asymptomatic with still has leukocytes in her urine I will reculture her urine and place her on just suppression dose of 1 Bactrim DS daily since the Proteus was indeed sensitive in the hospital to Bactrim.  Report to the emergency room if fever chills or significant blood in the urine agrees to pursue this I will call her with above urine culture repeat.  Yeast vaginitis improve dramatically on Diflucan  Let the her urologist know if any clinical worsening or present to the emergency room I will see her after her lithotripsy otherwise continue low-salt diet low-fat diet as above beta-blocker and statin therapy  @discharge  The above diagnosis and treatment plan was discussed with the patient patient will continue appropriate diet and exercise as reviewed  Patient will recheck  earlier if any interval problems of significance or clinical worsening of the above problems.  Agrees above surveillance.  All question were addressed regarding above meds

## 2025-04-13 LAB — BACTERIA UR CULT: NORMAL

## 2025-04-15 ASSESSMENT — ENCOUNTER SYMPTOMS
NUMBNESS: 0
ABDOMINAL DISTENTION: 0
FREQUENCY: 0
WHEEZING: 0
ARTHRALGIAS: 1
HEMATURIA: 0
FEVER: 0
UNEXPECTED WEIGHT CHANGE: 0
FATIGUE: 0
CONFUSION: 0
PALPITATIONS: 0
CHEST TIGHTNESS: 0
LIGHT-HEADEDNESS: 0
NAUSEA: 0
CONSTIPATION: 0
DYSURIA: 0
BLOOD IN STOOL: 0
WEAKNESS: 0
COUGH: 0
SHORTNESS OF BREATH: 0
ABDOMINAL PAIN: 0
HEADACHES: 0
DIARRHEA: 0
SLEEP DISTURBANCE: 0
MYALGIAS: 0
FLANK PAIN: 1

## 2025-04-18 DIAGNOSIS — B37.31 YEAST VAGINITIS: ICD-10-CM

## 2025-04-18 DIAGNOSIS — N12 PYELONEPHRITIS: ICD-10-CM

## 2025-04-18 RX ORDER — FLUCONAZOLE 100 MG/1
100 TABLET ORAL DAILY
Qty: 3 TABLET | Refills: 0 | Status: SHIPPED | OUTPATIENT
Start: 2025-04-18 | End: 2025-04-21

## 2025-04-18 RX ORDER — SULFAMETHOXAZOLE AND TRIMETHOPRIM 800; 160 MG/1; MG/1
1 TABLET ORAL 2 TIMES DAILY
Qty: 14 TABLET | Refills: 0 | Status: SHIPPED | OUTPATIENT
Start: 2025-04-18 | End: 2025-04-25

## 2025-04-18 NOTE — TELEPHONE ENCOUNTER
Pt states she spoke with you earlier in the week and was instructed to request an antibiotic to keep her from getting an infection prior to treatment for kidney stones. She is uncertain if you wanted to order a different antibiotic. Rx pended for refill of current Bactrim. PLEASE REVIEW AND ACCEPT OR DECLINE. She is also requesting a refill of Diflucan because she still has a yeast infection. Rx pended. Please accept or decline.

## 2025-04-21 ENCOUNTER — HOSPITAL ENCOUNTER (OUTPATIENT)
Dept: RADIOLOGY | Facility: HOSPITAL | Age: 70
Discharge: HOME | End: 2025-04-21
Payer: MEDICARE

## 2025-04-21 ENCOUNTER — APPOINTMENT (OUTPATIENT)
Dept: RADIOLOGY | Facility: HOSPITAL | Age: 70
End: 2025-04-21
Payer: MEDICARE

## 2025-04-21 ENCOUNTER — PRE-ADMISSION TESTING (OUTPATIENT)
Dept: PREADMISSION TESTING | Facility: HOSPITAL | Age: 70
End: 2025-04-21
Payer: MEDICARE

## 2025-04-21 VITALS
OXYGEN SATURATION: 96 % | RESPIRATION RATE: 16 BRPM | SYSTOLIC BLOOD PRESSURE: 144 MMHG | DIASTOLIC BLOOD PRESSURE: 73 MMHG | WEIGHT: 201.94 LBS | HEART RATE: 73 BPM | HEIGHT: 60 IN | BODY MASS INDEX: 39.65 KG/M2 | TEMPERATURE: 96.8 F

## 2025-04-21 DIAGNOSIS — Z48.00 DRESSING CHANGE: ICD-10-CM

## 2025-04-21 DIAGNOSIS — N20.0 LEFT NEPHROLITHIASIS: ICD-10-CM

## 2025-04-21 DIAGNOSIS — R82.90 ABNORMAL URINALYSIS: ICD-10-CM

## 2025-04-21 LAB
ANION GAP SERPL CALC-SCNC: 14 MMOL/L (ref 10–20)
APPEARANCE UR: CLEAR
APTT PPP: 28 SECONDS (ref 26–36)
BILIRUB UR STRIP.AUTO-MCNC: NEGATIVE MG/DL
BUN SERPL-MCNC: 16 MG/DL (ref 6–23)
CALCIUM SERPL-MCNC: 9.6 MG/DL (ref 8.6–10.3)
CHLORIDE SERPL-SCNC: 103 MMOL/L (ref 98–107)
CO2 SERPL-SCNC: 25 MMOL/L (ref 21–32)
COLOR UR: ABNORMAL
CREAT SERPL-MCNC: 1.12 MG/DL (ref 0.5–1.05)
EGFRCR SERPLBLD CKD-EPI 2021: 53 ML/MIN/1.73M*2
ERYTHROCYTE [DISTWIDTH] IN BLOOD BY AUTOMATED COUNT: 14.4 % (ref 11.5–14.5)
GLUCOSE SERPL-MCNC: 105 MG/DL (ref 74–99)
GLUCOSE UR STRIP.AUTO-MCNC: NORMAL MG/DL
HCT VFR BLD AUTO: 36.3 % (ref 36–46)
HGB BLD-MCNC: 11.5 G/DL (ref 12–16)
HOLD SPECIMEN: NORMAL
INR PPP: 1 (ref 0.9–1.1)
KETONES UR STRIP.AUTO-MCNC: NEGATIVE MG/DL
LEUKOCYTE ESTERASE UR QL STRIP.AUTO: ABNORMAL
MCH RBC QN AUTO: 30 PG (ref 26–34)
MCHC RBC AUTO-ENTMCNC: 31.7 G/DL (ref 32–36)
MCV RBC AUTO: 95 FL (ref 80–100)
NITRITE UR QL STRIP.AUTO: NEGATIVE
NRBC BLD-RTO: 0 /100 WBCS (ref 0–0)
PH UR STRIP.AUTO: 5.5 [PH]
PLATELET # BLD AUTO: 304 X10*3/UL (ref 150–450)
POTASSIUM SERPL-SCNC: 4.5 MMOL/L (ref 3.5–5.3)
PROT UR STRIP.AUTO-MCNC: NEGATIVE MG/DL
PROTHROMBIN TIME: 11.4 SECONDS (ref 9.8–12.4)
RBC # BLD AUTO: 3.83 X10*6/UL (ref 4–5.2)
RBC # UR STRIP.AUTO: NEGATIVE MG/DL
RBC #/AREA URNS AUTO: ABNORMAL /HPF
SODIUM SERPL-SCNC: 137 MMOL/L (ref 136–145)
SP GR UR STRIP.AUTO: 1.01
UROBILINOGEN UR STRIP.AUTO-MCNC: NORMAL MG/DL
WBC # BLD AUTO: 8.3 X10*3/UL (ref 4.4–11.3)
WBC #/AREA URNS AUTO: ABNORMAL /HPF
WBC CLUMPS #/AREA URNS AUTO: ABNORMAL /HPF

## 2025-04-21 PROCEDURE — 85027 COMPLETE CBC AUTOMATED: CPT

## 2025-04-21 PROCEDURE — 85610 PROTHROMBIN TIME: CPT

## 2025-04-21 PROCEDURE — 80048 BASIC METABOLIC PNL TOTAL CA: CPT

## 2025-04-21 PROCEDURE — 85730 THROMBOPLASTIN TIME PARTIAL: CPT

## 2025-04-21 PROCEDURE — 87086 URINE CULTURE/COLONY COUNT: CPT | Mod: STJLAB | Performed by: UROLOGY

## 2025-04-21 PROCEDURE — 81001 URINALYSIS AUTO W/SCOPE: CPT

## 2025-04-21 ASSESSMENT — LIFESTYLE VARIABLES: SMOKING_STATUS: NONSMOKER

## 2025-04-21 ASSESSMENT — DUKE ACTIVITY SCORE INDEX (DASI)
CAN YOU DO LIGHT WORK AROUND THE HOUSE LIKE DUSTING OR WASHING DISHES: YES
CAN YOU TAKE CARE OF YOURSELF (EAT, DRESS, BATHE, OR USE TOILET): YES
CAN YOU WALK INDOORS, SUCH AS AROUND YOUR HOUSE: YES
CAN YOU CLIMB A FLIGHT OF STAIRS OR WALK UP A HILL: YES
TOTAL_SCORE: 34.7
CAN YOU HAVE SEXUAL RELATIONS: YES
CAN YOU RUN A SHORT DISTANCE: NO
CAN YOU PARTICIPATE IN STRENOUS SPORTS LIKE SWIMMING, SINGLES TENNIS, FOOTBALL, BASKETBALL, OR SKIING: NO
CAN YOU PARTICIPATE IN MODERATE RECREATIONAL ACTIVITIES LIKE GOLF, BOWLING, DANCING, DOUBLES TENNIS OR THROWING A BASEBALL OR FOOTBALL: YES
CAN YOU DO MODERATE WORK AROUND THE HOUSE LIKE VACUUMING, SWEEPING FLOORS OR CARRYING GROCERIES: YES
CAN YOU WALK A BLOCK OR TWO ON LEVEL GROUND: YES
CAN YOU DO YARD WORK LIKE RAKING LEAVES, WEEDING OR PUSHING A MOWER: YES
CAN YOU DO HEAVY WORK AROUND THE HOUSE LIKE SCRUBBING FLOORS OR LIFTING AND MOVING HEAVY FURNITURE: NO
DASI METS SCORE: 7

## 2025-04-21 ASSESSMENT — ACTIVITIES OF DAILY LIVING (ADL): ADL_SCORE: 0

## 2025-04-21 ASSESSMENT — PAIN - FUNCTIONAL ASSESSMENT: PAIN_FUNCTIONAL_ASSESSMENT: 0-10

## 2025-04-21 NOTE — NURSING NOTE
Pt here for nephrostomy dressing change. No issues  per pt, just some irritation under dressing. Dressing removed, insertion site slightly red, no drainage around site, catheter 23-24 cm at the skin, some clear yellow drainage in bag. I confirmed with Dr. Schoenberger that it was OK since originally it was 21 cm at the skin.  Pt states she has surgery for her kidney stone on May 7th but wants dressing change scheduled for May 5th.  Blanca rad tech, placed new dressing without difficulty, pt pinned drain to pants and ambulated to front without difficulty.

## 2025-04-21 NOTE — PREPROCEDURE INSTRUCTIONS
Thank you for visiting [facility]read[facility] at [facility]. If you have any changes to your health condition, please call the SURGEON's office to alert them and give them details of your symptoms.        Preoperative Brain Exercises    What are brain exercises?  A brain exercise is any activity that engages your thinking (cognitive) skills.    What types of activities are considered brain exercises?  Jigsaw puzzles, crossword puzzles, word jumble, memory games, word search, and many more.  Many can be found free online or on your phone via a mobile angela.    Why should I do brain exercises before my surgery?  More recent research has shown brain exercise before surgery can lower the risk of postoperative delirium (confusion) which can be especially important for older adults.  Patients who did brain exercises for 5 to 10 hours the days before surgery, cut their risk of postoperative delirium in half up to 1 week after surgery.      Preoperative Deep Breathing Exercises    Why it is important to do deep breathing exercises before my surgery?  Deep breathing exercises strengthen your breathing muscles.  This helps you to recover after your surgery and decreases the chance of breathing complications.    How are the deep breathing exercises done?  Sit straight with your back supported.  Breathe in deeply and slowly through your nose. Your lower rib cage should expand and your abdomen may move forward.  Hold that breath for 3 to 5 seconds.  Breathe out through pursed lips, slowly and completely.  Rest and repeat 10 times every hour while awake.  Rest longer if you become dizzy or lightheaded.      Patient and Family Education   Ways You Can Help Prevent Blood Clots     This handout explains some simple things you can do to help prevent blood clots.      Blood clots are blockages that can form in the body's veins. When a blood clot forms in your deep veins, it may be called a deep vein thrombosis, or DVT for short. Blood clots can  happen in any part of the body where blood flows, but they are most common in the arms and legs. If a piece of a blood clot breaks free and travels to the lungs, it is called a pulmonary embolus (PE). A PE can be a very serious problem.      Being in the hospital or having surgery can raise your chances of getting a blood clot because you may not be well enough to move around as much as you normally do.      Ways you can help prevent blood clots in the hospital         Wearing SCDs. SCDs stands for Sequential Compression Devices.   SCDs are special sleeves that wrap around your legs  They attach to a pump that fills them with air to gently squeeze your legs every few minutes.   This helps return the blood in your legs to your heart.   SCDs should only be taken off when walking or bathing.   SCDs may not be comfortable, but they can help save your life.               Wearing compression stockings - if your doctor orders them. These special snug fitting stockings gently squeeze your legs to help blood flow.       Walking. Walking helps move the blood in your legs.   If your doctor says it is ok, try walking the halls at least   5 times a day. Ask us to help you get up, so you don't fall.      Taking any blood thinning medicines your doctor orders.          ©[facility]; 3/23        Ways you can help prevent blood clots at home       Wearing compression stockings - if your doctor orders them. ? Walking - to help move the blood in your legs.       Taking any blood thinning medicines your doctor orders.      Signs of a blood clot or PE      Tell your doctor or nurse know right away if you have of the problems listed below.    If you are at home, seek medical care right away. Call 911 for chest pain or problems breathing.          Signs of a blood clot (DVT) - such as pain,  swelling, redness or warmth in your arm or leg      Signs of a pulmonary embolism (PE) - such as chest     pain or feeling short of breath

## 2025-04-21 NOTE — PREPROCEDURE INSTRUCTIONS
Medication List            Accurate as of April 21, 2025  9:50 AM. Always use your most recent med list.                aspirin 81 mg EC tablet  Additional Medication Adjustments for Surgery: Other (Comment)  Notes to patient: Per provider recommendations     atorvastatin 40 mg tablet  Commonly known as: Lipitor  Take 1 tablet (40 mg) by mouth once daily.  Medication Adjustments for Surgery: Take/Use as prescribed     busPIRone 5 mg tablet  Commonly known as: Buspar  Take 1 tablet (5 mg) by mouth once daily as needed (if needed nerves).  Medication Adjustments for Surgery: Take/Use as prescribed     ergocalciferol 1250 mcg (50,000 units) capsule  Commonly known as: Vitamin D-2  Take 1 capsule (1,250 mcg) by mouth 1 (one) time per week.  Additional Medication Adjustments for Surgery: Take last dose 7 days before surgery     estradiol 0.01 % (0.1 mg/gram) vaginal cream  Commonly known as: Estrace  Insert 0.25 Applicatorfuls (1 g) into the vagina 3 times a week.  Medication Adjustments for Surgery: Do Not take on the morning of surgery     fluconazole 100 mg tablet  Commonly known as: Diflucan  Take 1 tablet (100 mg) by mouth once daily for 3 days.  Medication Adjustments for Surgery: Take/Use as prescribed     ibuprofen 600 mg tablet  Take 1 tablet (600 mg) by mouth 2 times daily (morning and late afternoon).  Additional Medication Adjustments for Surgery: Take last dose 7 days before surgery     meclizine 12.5 mg tablet  Commonly known as: Antivert  Take 1 tablet (12.5 mg) by mouth 3 times a day as needed for dizziness.  Medication Adjustments for Surgery: Do Not take on the morning of surgery     metoprolol succinate XL 50 mg 24 hr tablet  Commonly known as: Toprol-XL  Take 1 tablet (50 mg) by mouth once daily. Do not crush or chew.  Medication Adjustments for Surgery: Take/Use as prescribed     oxyCODONE-acetaminophen 5-325 mg tablet  Commonly known as: Percocet  Take 1 tablet by mouth every 8 hours if needed  for moderate pain (4 - 6) or severe pain (7 - 10).  Medication Adjustments for Surgery: Take/Use as prescribed     sulfamethoxazole-trimethoprim 800-160 mg tablet  Commonly known as: Bactrim DS  Take 1 tablet by mouth 2 times a day for 7 days.  Medication Adjustments for Surgery: Take/Use as prescribed                         PRE-OPERATIVE INSTRUCTIONS    You will receive notification one business day prior to your procedure to confirm your arrival time. It is important that you answer your phone and/or check your messages during this time. If you do not hear from the surgery center by 5 pm. the day before your procedure, please call [phone].     Please enter the building through the Outpatient entrance and take the elevator off the lobby to the 2nd floor then check in at the Outpatient Surgery desk on the 2nd floor.    INSTRUCTIONS:  Talk to your surgeon for instructions if you should stop your aspirin, blood thinner, or diabetes medicines.  DO NOT take any multivitamins or over the counter supplements for 7-10 days before surgery.  If not being admitted, you must have an adult immediately available to drive you home after surgery. We also highly recommend you have someone stay with you for the entire day and night of your surgery.  For children having surgery, a parent or legal guardian must accompany them to the surgery center. If this is not possible, please call [phone] to make additional arrangements.  For adults who are unable to consent or make medical decisions for themselves, a legal guardian or Power of  must accompany them to the surgery center. If this is not possible, please call [phone] to make additional arrangements.  Wear comfortable, loose fitting clothing.  All jewelry and piercings must be removed. If you are unable to remove an item or have a dermal piercing, please be sure to tell the nurse when you arrive for surgery.  Nail polish and make-up must be removed.  Avoid  smoking or consuming alcohol for 24 hours before surgery.  To help prevent infection, please take a shower/bath and wash your hair the night before and/or morning of surgery (or follow other specific bathing instructions provided).    Preoperative Fasting Guidelines    Why must I stop eating and drinking near surgery time?  With sedation, food or liquid in your stomach can enter your lungs causing serious complications  Increases nausea and vomiting    When do I need to stop eating and drinking before my surgery?  Do not eat any solid food after midnight the night before your surgery/procedure unless otherwise instructed by your surgeon.   If you take a GLP-1 medication (ex: Trulicity, Ozempic, Mounjaro, Zepbound, Bydyreon, Tanzeun, Saxenda, Victoza, Adlyxin, Rybelus) please follow other specific instructions provided regarding preoperative          fasting.  You may have up to 13.5 ounces of clear liquid until TWO hours before your instructed arrival time to the hospital.  This includes water, black tea/coffee, (no milk or cream) apple juice, and electrolyte drinks (Gatorade).   You may chew gum until TWO hours before your surgery/procedure         If applicable, notify your surgeons office immediately of any new skin changes that occur to the surgical limb.      If you have any questions or concerns, please call Pre-Admission Testing at [phone].     Home Preoperative Antibacterial Shower with Chlorhexidine gluconate (CHG)     What is a home preoperative antibacterial shower?  This shower is a way of cleaning the skin with a germ killing solution before surgery. The solution contains chlorhexidine gluconate, commonly known as CHG. CHG is a skin cleanser with germ killing ability. Let your doctor know if you are allergic to chlorhexidine.    Why do I need to take a preoperative antibacterial shower?  Skin is not sterile. It is best to try to make your skin as free of germs as possible before surgery. Proper  cleansing with a germ killing soap before surgery can lower the number of germs on your skin. This helps to reduce the risk of infection at the surgical site. Following the instructions listed below will help you prepare your skin for surgery.    How do I use the solution?  Begin using your CHG soap the night before and again the morning of your procedure.   Do not shave the day before or day of surgery.  Remove all jewelry until after surgery. Take off rings and take out all body-piercing jewelry.  Wash your face and hair with normal soap and shampoo before you use the CHG soap.  Apply the CHG solution to a clean wet washcloth. Move away from the water to avoid premature rinsing of the CHG soap as you are applying. Firmly lather your entire body from the neck down. Do not use CHG on your face, eyes, ears, or genitals.   Pay special attention to the area where your incisions will be located.  Do not scrub your skin too hard.  It is important to allow the CHG soap to sit on your skin for 3-5 minutes.  Rinse the solution off your body completely. Do not wash with your normal soap after the CHG soap solution.  Pat yourself dry with a clean, soft towel.  Do not apply powders, lotions or deodorants as these might block how the CHG soap works.   Dress in clean clothing.  Be sure to sleep with clean, freshly laundered sheets.  Be aware that CHG can cause stains on fabric. Rinse your washcloth and other linens that have contact with CHG completely. Use only non-chlorine detergents to launder the items used.

## 2025-04-22 LAB — BACTERIA UR CULT: NORMAL

## 2025-04-24 ENCOUNTER — PATIENT OUTREACH (OUTPATIENT)
Dept: PRIMARY CARE | Facility: CLINIC | Age: 70
End: 2025-04-24
Payer: MEDICARE

## 2025-04-28 ENCOUNTER — OFFICE VISIT (OUTPATIENT)
Dept: PRIMARY CARE | Facility: CLINIC | Age: 70
End: 2025-04-28
Payer: MEDICARE

## 2025-04-28 ENCOUNTER — APPOINTMENT (OUTPATIENT)
Dept: ORTHOPEDIC SURGERY | Facility: CLINIC | Age: 70
End: 2025-04-28
Payer: MEDICARE

## 2025-04-28 VITALS
OXYGEN SATURATION: 97 % | DIASTOLIC BLOOD PRESSURE: 78 MMHG | SYSTOLIC BLOOD PRESSURE: 122 MMHG | TEMPERATURE: 97.3 F | HEART RATE: 75 BPM | BODY MASS INDEX: 38.68 KG/M2 | HEIGHT: 60 IN | RESPIRATION RATE: 16 BRPM | WEIGHT: 197 LBS

## 2025-04-28 DIAGNOSIS — I10 ESSENTIAL (PRIMARY) HYPERTENSION: ICD-10-CM

## 2025-04-28 DIAGNOSIS — N20.0 LEFT RENAL STONE: Primary | ICD-10-CM

## 2025-04-28 DIAGNOSIS — Z87.448 HISTORY OF PYELONEPHRITIS: ICD-10-CM

## 2025-04-28 LAB
POC APPEARANCE, URINE: CLEAR
POC BILIRUBIN, URINE: NEGATIVE
POC BLOOD, URINE: NEGATIVE
POC COLOR, URINE: YELLOW
POC GLUCOSE, URINE: NEGATIVE MG/DL
POC KETONES, URINE: NEGATIVE MG/DL
POC LEUKOCYTES, URINE: ABNORMAL
POC NITRITE,URINE: NEGATIVE
POC PH, URINE: 6 PH
POC PROTEIN, URINE: NEGATIVE MG/DL
POC SPECIFIC GRAVITY, URINE: 1.02
POC UROBILINOGEN, URINE: 0.2 EU/DL

## 2025-04-28 PROCEDURE — 1036F TOBACCO NON-USER: CPT | Performed by: FAMILY MEDICINE

## 2025-04-28 PROCEDURE — 99213 OFFICE O/P EST LOW 20 MIN: CPT | Performed by: FAMILY MEDICINE

## 2025-04-28 PROCEDURE — 3008F BODY MASS INDEX DOCD: CPT | Performed by: FAMILY MEDICINE

## 2025-04-28 PROCEDURE — 3074F SYST BP LT 130 MM HG: CPT | Performed by: FAMILY MEDICINE

## 2025-04-28 PROCEDURE — 1123F ACP DISCUSS/DSCN MKR DOCD: CPT | Performed by: FAMILY MEDICINE

## 2025-04-28 PROCEDURE — 3078F DIAST BP <80 MM HG: CPT | Performed by: FAMILY MEDICINE

## 2025-04-28 PROCEDURE — 1159F MED LIST DOCD IN RCRD: CPT | Performed by: FAMILY MEDICINE

## 2025-04-28 PROCEDURE — 1160F RVW MEDS BY RX/DR IN RCRD: CPT | Performed by: FAMILY MEDICINE

## 2025-04-28 PROCEDURE — 81003 URINALYSIS AUTO W/O SCOPE: CPT | Performed by: FAMILY MEDICINE

## 2025-04-28 RX ORDER — SULFAMETHOXAZOLE AND TRIMETHOPRIM 800; 160 MG/1; MG/1
TABLET ORAL
Qty: 14 TABLET | Refills: 0 | Status: SHIPPED | OUTPATIENT
Start: 2025-04-28

## 2025-04-28 NOTE — PROGRESS NOTES
Subjective   Patient ID: Bteh Quinones is a 69 y.o. female who presents for Pyelonephritis (2 week follow up ) and Ear Fullness (Right ear ).  HPI  Pleasant 69-year-old woman who is here to recheck 2 things very mild pressure deep in the right ear with ongoing nasal congestion from her allergies she has been taking normal saline needs a return to her nasal steroids  History of earlier fatigue significant bacteremia from left pyelonephritis from a obstructing 1 cm proximal left ureteral stone she has lithotripsy planned next week and has been on back Bactrim DS twice a day since it will cover the earlier Proteus cultured from her urine and blood  At this time the patient is just taking 1 Bactrim DS and her urine today shows no blood no proteinuria and markedly improved pyuria.  She has an indwelling left nephrostomy tube and her urine has been clear no flank pain no abdominal pain fever chills has been stable otherwise  Remains on her metoprolol for hypertension  Had secondary a yeast infection and held her statin therapy and did well on Diflucan for 3 days.  No striking discharge or itching of the perineum  Well post discharge with increasing liquids and the above Bactrim DS to prevent secondary infection or relapse no chest pains or another palpitations  Review of Systems   Constitutional:  Negative for fatigue and fever.   HENT:  Positive for ear pain and rhinorrhea. Negative for ear discharge, mouth sores, sinus pressure, sinus pain, sore throat and tinnitus.    Eyes:  Negative for visual disturbance.   Respiratory:  Negative for cough, chest tightness and shortness of breath.    Cardiovascular:  Negative for chest pain, palpitations and leg swelling.   Gastrointestinal:  Negative for abdominal distention, abdominal pain, blood in stool, nausea and vomiting.   Genitourinary:  Negative for dysuria, flank pain, frequency, hematuria and pelvic pain.   Musculoskeletal:  Positive for myalgias. Negative for  arthralgias.   Skin:  Negative for rash.   Neurological:  Negative for syncope, weakness, light-headedness, numbness and headaches.   Psychiatric/Behavioral:  Negative for behavioral problems, confusion, sleep disturbance and suicidal ideas.        Objective   /78 (BP Location: Right arm, Patient Position: Sitting, BP Cuff Size: Large adult)   Pulse 75   Temp 36.3 °C (97.3 °F) (Temporal)   Resp 16   Ht 1.524 m (5')   Wt 89.4 kg (197 lb)   SpO2 97%   BMI 38.47 kg/m²            Component  Ref Range & Units 09:59 2 wk ago 3 wk ago 4 wk ago   POC Color, Urine  Straw, Yellow, Light-Yellow Yellow Yellow Yellow Yellow   POC Appearance, Urine  Clear Clear Clear Clear Clear   POC Glucose, Urine  NEGATIVE mg/dl NEGATIVE NEGATIVE NEGATIVE NEGATIVE   POC Bilirubin, Urine  NEGATIVE NEGATIVE NEGATIVE NEGATIVE NEGATIVE   POC Ketones, Urine  NEGATIVE mg/dl NEGATIVE NEGATIVE NEGATIVE NEGATIVE   POC Specific Gravity, Urine  1.005 - 1.035 1.025 1.015 >=1.030 >=1.030   POC Blood, Urine  NEGATIVE NEGATIVE SMALL (1+) Abnormal  SMALL (1+) Abnormal  SMALL (1+) Abnormal    POC PH, Urine  No Reference Range Established PH 6.0 6.5 6.0 5.5   POC Protein, Urine  NEGATIVE mg/dl NEGATIVE TRACE Abnormal  30 (1+) Abnormal  30 (1+) Abnormal    POC Urobilinogen, Urine  0.2, 1.0 EU/DL 0.2 0.2 0.2 0.2   Poc Nitrite, Urine  NEGATIVE NEGATIVE NEGATIVE NEGATIVE NEGATIVE   POC Leukocytes, Urine  NEGATIVE SMALL (1+) Abnormal  LARGE (3+) Abnormal  SMALL (1+) Abnormal  SMALL (1+) Abnormal              Specimen Collected: 04/28/25 09:59 Last Resulted: 04/28/25 09:59         Component    CULTURE, URINE, ROUTINE SEE NOTE    Comment:    CULTURE, URINE, ROUTINE       Micro Number:      [id number]    Test Status:       Final    Specimen Source:   Urine, clean catch    Specimen Quality:  Adequate    Result:            No Growth   Resulting Agency [facility]             Specimen Collected: 04/11/25 09:35 Last Resulted:  04/13/25 07:59           Component  Ref Range & Units 2 wk ago 3 wk ago 4 wk ago   POC Color, Urine  Straw, Yellow, Light-Yellow Yellow Yellow Yellow   POC Appearance, Urine  Clear Clear Clear Clear   POC Glucose, Urine  NEGATIVE mg/dl NEGATIVE NEGATIVE NEGATIVE   POC Bilirubin, Urine  NEGATIVE NEGATIVE NEGATIVE NEGATIVE   POC Ketones, Urine  NEGATIVE mg/dl NEGATIVE NEGATIVE NEGATIVE   POC Specific Gravity, Urine  1.005 - 1.035 1.015 >=1.030 >=1.030   POC Blood, Urine  NEGATIVE SMALL (1+) Abnormal  SMALL (1+) Abnormal  SMALL (1+) Abnormal    POC PH, Urine  No Reference Range Established PH 6.5 6.0 5.5   POC Protein, Urine  NEGATIVE mg/dl TRACE Abnormal  30 (1+) Abnormal  30 (1+) Abnormal    POC Urobilinogen, Urine  0.2, 1.0 EU/DL 0.2 0.2 0.2   Poc Nitrite, Urine  NEGATIVE NEGATIVE NEGATIVE NEGATIVE   POC Leukocytes, Urine  NEGATIVE LARGE (3+            Physical Exam  Vital signs reviewed and normal patient afebrile  General inspection reveals a pleasant or active individual in no acute distress  Neck neck is the mass that the bruise rigidity  Pulmonary chest is clear and Juline posteriorly no current cardiovascular-RSR without murmurs gallop or Actamin ectopy  Abdominal no organomegaly masses or rebound bowel sounds are normal left flank shows intact left nephrostomy tube draining clear urine no posterior anterior abdominal rashes.  Urine shows cleared proteinuria cleared hematuria  Trace pyuria on suppression Bactrim  The mild retraction of the right TM but otherwise no canal abnormalities no redness of the right TM left TM is unremarkable nose with mild to moderate swelling but no sinus tenderness or thick exudate  CT abdomen pelvis w IV contrast  Status: Final result     PACS Images     Show images for CT abdomen pelvis w IV contrast  Signed by    Signed Time Phone Pager   Bebo Negrete MD 3/23/2025 15:11 [phone]     Exam Information    Status Exam Begun Exam Ended   Final 3/23/2025 13:41  3/23/2025 13:47     Study Result    Narrative & Impression   Interpreted By:  Bebo Negrete,   STUDY:  CT ABDOMEN PELVIS W IV CONTRAST;  3/23/2025 1:47 pm      INDICATION:  Signs/Symptoms:proteus bacteremia, persistent elevation LFTs, r/o  liver involvement.      COMPARISON:  03/18/2025 right upper quadrant abdominal ultrasound. No prior  abdominal or pelvic CT.      ACCESSION NUMBER(S):  [id number]      ORDERING CLINICIAN:  SORAIDA STERN      TECHNIQUE:  CT of the abdomen and pelvis was performed.  Standard contiguous  axial images were obtained at 3 mm slice thickness through the  abdomen and pelvis. Coronal and sagittal reconstructions at 3 mm  slice thickness were performed.  75 ML of Omnipaque 350 was  administered intravenously without immediate complication.      FINDINGS:  LOWER CHEST:  Mild right lower lobe subsegmental atelectasis or scarring.      ABDOMEN:      LIVER:  The liver is normal in size without evidence of focal lesions.      BILE DUCTS:  The intrahepatic and extrahepatic ducts are not dilated.      GALLBLADDER:  Cholecystectomy clips.      PANCREAS:  Within normal limits.      SPLEEN:  Normal size and homogeneous.      ADRENAL GLANDS:  Within normal limits.      KIDNEYS AND URETERS:  10 mm axial diameter and 18 mm tall very proximal (superior) left  ureteral calcification at the L3 level. Mild left hydronephrosis  without perinephric stranding. Multiple calyceal and infundibular  lower left renal calcifications measure up to almost 1 cm. Normal  size kidneys without a mass or right hydroureteronephrosis.      PELVIS:      BLADDER:  Collapsed precluding evaluation for mucosal lesions. No calcified  stone.      REPRODUCTIVE ORGANS:  Status post hysterectomy. The ovaries can not be identified.      BOWEL:  The stomach is unremarkable.   Moderate amount of stool without bowel  distention. Almost gasless small bowel. Normal retrocecal appendix.      VESSELS:  The aorta and IVC appear  normal. Minimal vascular calcification.      PERITONEUM/RETROPERITONEUM/LYMPH NODES:  No ascites, free air or fluid collection.  No abdominopelvic  lymphadenopathy.      BONES AND ABDOMINAL WALL:  Marked, extensive spinal degenerative changes include very severe  bilateral hypertrophic L4-5 facet joint DJD accounting for minimal  spondylolisthesis at this level. Slight scoliosis. Bilateral  sacroiliac joint DJD. Diastasis recti.      IMPRESSION:  1. 10 x 10 x 18 mm proximal left ureteral calculus with mild left  hydronephrosis.  2. Left renal calculi.      MACRO:  None         Assessment/Plan   Problem List Items Addressed This Visit       Pyelonephritis    Relevant Orders    POCT UA Automated manually resulted   Repeat urine culture with remaining pyuria although improved hematuria  Will have her follow-up with her urologist in about 1 to 2 weeks for lithotripsy  Continue increasing liquids call if fever or chills otherwise did very well with improved urine on twice daily Bactrim so we will go to 1 Bactrim DS to prevent relapse  Continue low-salt diet continue metoprolol alternating normal saline and nasal steroids to help eustachian tube dysfunction otherwise no otitis media today follow-up about 3 weeks which was after her lithotripsy.  Urine culture would be sent and we will notify her appropriately and likely strongly urge but is done very well postop without relapse on Bactrim DS  @discharge  The above diagnosis and treatment plan was discussed with the patient patient will continue appropriate diet and exercise as reviewed  Patient will recheck earlier if any interval problems of significance or clinical worsening of the above problems.  Agrees above surveillance.  All question were addressed regarding above meds

## 2025-04-30 LAB — BACTERIA UR CULT: NORMAL

## 2025-05-01 ASSESSMENT — ENCOUNTER SYMPTOMS
VOMITING: 0
ABDOMINAL PAIN: 0
RHINORRHEA: 1
WEAKNESS: 0
COUGH: 0
FLANK PAIN: 0
NAUSEA: 0
CHEST TIGHTNESS: 0
FATIGUE: 0
SINUS PAIN: 0
SLEEP DISTURBANCE: 0
HEMATURIA: 0
BLOOD IN STOOL: 0
HEADACHES: 0
FREQUENCY: 0
FEVER: 0
SHORTNESS OF BREATH: 0
CONFUSION: 0
NUMBNESS: 0
ABDOMINAL DISTENTION: 0
PALPITATIONS: 0
ARTHRALGIAS: 0
MYALGIAS: 1
LIGHT-HEADEDNESS: 0
DYSURIA: 0
SINUS PRESSURE: 0
SORE THROAT: 0

## 2025-05-05 ENCOUNTER — HOSPITAL ENCOUNTER (OUTPATIENT)
Dept: RADIOLOGY | Facility: HOSPITAL | Age: 70
Discharge: HOME | End: 2025-05-05
Payer: MEDICARE

## 2025-05-05 DIAGNOSIS — Z93.6 NEPHROSTOMY PRESENT: ICD-10-CM

## 2025-05-05 NOTE — NURSING NOTE
Pt here to have nephrostomy tube dressing changed.  Bag still draining without issues, dressing removed, site intact, 16cm at the skin. New dressing and bag applied, pt tolerated well, new appt made if pt still has nephrostomy tube for 5/19/25.

## 2025-05-06 ENCOUNTER — ANESTHESIA EVENT (OUTPATIENT)
Dept: OPERATING ROOM | Facility: HOSPITAL | Age: 70
End: 2025-05-06
Payer: MEDICARE

## 2025-05-07 ENCOUNTER — HOSPITAL ENCOUNTER (OUTPATIENT)
Facility: HOSPITAL | Age: 70
Setting detail: OUTPATIENT SURGERY
Discharge: HOME | End: 2025-05-07
Attending: UROLOGY | Admitting: UROLOGY
Payer: MEDICARE

## 2025-05-07 ENCOUNTER — PHARMACY VISIT (OUTPATIENT)
Dept: PHARMACY | Facility: CLINIC | Age: 70
End: 2025-05-07
Payer: COMMERCIAL

## 2025-05-07 ENCOUNTER — APPOINTMENT (OUTPATIENT)
Dept: RADIOLOGY | Facility: HOSPITAL | Age: 70
End: 2025-05-07
Payer: MEDICARE

## 2025-05-07 ENCOUNTER — ANESTHESIA (OUTPATIENT)
Dept: OPERATING ROOM | Facility: HOSPITAL | Age: 70
End: 2025-05-07
Payer: MEDICARE

## 2025-05-07 VITALS
WEIGHT: 199 LBS | SYSTOLIC BLOOD PRESSURE: 130 MMHG | OXYGEN SATURATION: 99 % | RESPIRATION RATE: 18 BRPM | DIASTOLIC BLOOD PRESSURE: 61 MMHG | HEART RATE: 60 BPM | HEIGHT: 60 IN | TEMPERATURE: 96.8 F | BODY MASS INDEX: 39.07 KG/M2

## 2025-05-07 DIAGNOSIS — G89.18 POST-OPERATIVE PAIN: ICD-10-CM

## 2025-05-07 DIAGNOSIS — N20.0 LEFT RENAL STONE: Primary | ICD-10-CM

## 2025-05-07 DIAGNOSIS — N20.0 LEFT NEPHROLITHIASIS: ICD-10-CM

## 2025-05-07 DIAGNOSIS — N12 PYELONEPHRITIS: ICD-10-CM

## 2025-05-07 PROCEDURE — RXMED WILLOW AMBULATORY MEDICATION CHARGE

## 2025-05-07 PROCEDURE — 2720000007 HC OR 272 NO HCPCS: Performed by: UROLOGY

## 2025-05-07 PROCEDURE — A50081 PR PERCUT REMV KID STONE,2+ CM: Performed by: NURSE ANESTHETIST, CERTIFIED REGISTERED

## 2025-05-07 PROCEDURE — 3700000002 HC GENERAL ANESTHESIA TIME - EACH INCREMENTAL 1 MINUTE: Performed by: UROLOGY

## 2025-05-07 PROCEDURE — 7100000009 HC PHASE TWO TIME - INITIAL BASE CHARGE: Performed by: UROLOGY

## 2025-05-07 PROCEDURE — 7100000010 HC PHASE TWO TIME - EACH INCREMENTAL 1 MINUTE: Performed by: UROLOGY

## 2025-05-07 PROCEDURE — 3600000003 HC OR TIME - INITIAL BASE CHARGE - PROCEDURE LEVEL THREE: Performed by: UROLOGY

## 2025-05-07 PROCEDURE — 3600000008 HC OR TIME - EACH INCREMENTAL 1 MINUTE - PROCEDURE LEVEL THREE: Performed by: UROLOGY

## 2025-05-07 PROCEDURE — 2780000003 HC OR 278 NO HCPCS: Performed by: UROLOGY

## 2025-05-07 PROCEDURE — 82365 CALCULUS SPECTROSCOPY: CPT | Performed by: UROLOGY

## 2025-05-07 PROCEDURE — 7100000002 HC RECOVERY ROOM TIME - EACH INCREMENTAL 1 MINUTE: Performed by: UROLOGY

## 2025-05-07 PROCEDURE — C1758 CATHETER, URETERAL: HCPCS | Performed by: UROLOGY

## 2025-05-07 PROCEDURE — 50081 PERQ NL/PL LITHOTRP CPLX>2CM: CPT | Performed by: UROLOGY

## 2025-05-07 PROCEDURE — 2500000005 HC RX 250 GENERAL PHARMACY W/O HCPCS

## 2025-05-07 PROCEDURE — C1747 HC OR 272 NO HCPCS: HCPCS | Performed by: UROLOGY

## 2025-05-07 PROCEDURE — 7100000001 HC RECOVERY ROOM TIME - INITIAL BASE CHARGE: Performed by: UROLOGY

## 2025-05-07 PROCEDURE — 50436 DILAT XST TRC NDURLGC PX: CPT | Performed by: UROLOGY

## 2025-05-07 PROCEDURE — A50081 PR PERCUT REMV KID STONE,2+ CM: Performed by: ANESTHESIOLOGY

## 2025-05-07 PROCEDURE — 2500000004 HC RX 250 GENERAL PHARMACY W/ HCPCS (ALT 636 FOR OP/ED): Performed by: UROLOGY

## 2025-05-07 PROCEDURE — 3700000001 HC GENERAL ANESTHESIA TIME - INITIAL BASE CHARGE: Performed by: UROLOGY

## 2025-05-07 PROCEDURE — 2500000001 HC RX 250 WO HCPCS SELF ADMINISTERED DRUGS (ALT 637 FOR MEDICARE OP): Performed by: UROLOGY

## 2025-05-07 PROCEDURE — 2500000005 HC RX 250 GENERAL PHARMACY W/O HCPCS: Mod: JZ | Performed by: UROLOGY

## 2025-05-07 PROCEDURE — C2617 STENT, NON-COR, TEM W/O DEL: HCPCS | Performed by: UROLOGY

## 2025-05-07 PROCEDURE — 2500000005 HC RX 250 GENERAL PHARMACY W/O HCPCS: Performed by: ANESTHESIOLOGY

## 2025-05-07 PROCEDURE — C1769 GUIDE WIRE: HCPCS | Performed by: UROLOGY

## 2025-05-07 PROCEDURE — 2500000004 HC RX 250 GENERAL PHARMACY W/ HCPCS (ALT 636 FOR OP/ED): Performed by: NURSE ANESTHETIST, CERTIFIED REGISTERED

## 2025-05-07 PROCEDURE — 2500000004 HC RX 250 GENERAL PHARMACY W/ HCPCS (ALT 636 FOR OP/ED)

## 2025-05-07 DEVICE — URETERAL STENT
Type: IMPLANTABLE DEVICE | Site: KIDNEY | Status: FUNCTIONAL
Brand: PERCUFLEX™ PLUS

## 2025-05-07 RX ORDER — POLYETHYLENE GLYCOL 3350 17 G/17G
17 POWDER, FOR SOLUTION ORAL DAILY
Qty: 510 G | Refills: 0 | Status: SHIPPED | OUTPATIENT
Start: 2025-05-07 | End: 2025-06-06

## 2025-05-07 RX ORDER — FENTANYL CITRATE 50 UG/ML
INJECTION, SOLUTION INTRAMUSCULAR; INTRAVENOUS AS NEEDED
Status: DISCONTINUED | OUTPATIENT
Start: 2025-05-07 | End: 2025-05-07

## 2025-05-07 RX ORDER — OXYCODONE HYDROCHLORIDE 5 MG/1
5 TABLET ORAL EVERY 4 HOURS PRN
Status: DISCONTINUED | OUTPATIENT
Start: 2025-05-07 | End: 2025-05-07 | Stop reason: HOSPADM

## 2025-05-07 RX ORDER — BUPIVACAINE HYDROCHLORIDE 5 MG/ML
INJECTION, SOLUTION PERINEURAL AS NEEDED
Status: DISCONTINUED | OUTPATIENT
Start: 2025-05-07 | End: 2025-05-07 | Stop reason: HOSPADM

## 2025-05-07 RX ORDER — ACETAMINOPHEN 325 MG/1
975 TABLET ORAL ONCE
Status: DISCONTINUED | OUTPATIENT
Start: 2025-05-07 | End: 2025-05-07 | Stop reason: HOSPADM

## 2025-05-07 RX ORDER — CEFAZOLIN SODIUM 2 G/100ML
2 INJECTION, SOLUTION INTRAVENOUS ONCE
Status: COMPLETED | OUTPATIENT
Start: 2025-05-07 | End: 2025-05-07

## 2025-05-07 RX ORDER — OXYCODONE HYDROCHLORIDE 5 MG/1
5 TABLET ORAL EVERY 6 HOURS PRN
Qty: 8 TABLET | Refills: 0 | Status: SHIPPED | OUTPATIENT
Start: 2025-05-07 | End: 2025-05-15 | Stop reason: WASHOUT

## 2025-05-07 RX ORDER — CEPHALEXIN 500 MG/1
500 CAPSULE ORAL 2 TIMES DAILY
Qty: 6 CAPSULE | Refills: 0 | Status: SHIPPED | OUTPATIENT
Start: 2025-05-07 | End: 2025-05-15 | Stop reason: ALTCHOICE

## 2025-05-07 RX ORDER — ACETAMINOPHEN 325 MG/1
975 TABLET ORAL ONCE
Status: COMPLETED | OUTPATIENT
Start: 2025-05-07 | End: 2025-05-07

## 2025-05-07 RX ORDER — OXYCODONE AND ACETAMINOPHEN 5; 325 MG/1; MG/1
1 TABLET ORAL EVERY 4 HOURS PRN
Status: DISCONTINUED | OUTPATIENT
Start: 2025-05-07 | End: 2025-05-07 | Stop reason: HOSPADM

## 2025-05-07 RX ORDER — SODIUM CHLORIDE 0.9 G/100ML
INJECTION, SOLUTION IRRIGATION AS NEEDED
Status: DISCONTINUED | OUTPATIENT
Start: 2025-05-07 | End: 2025-05-07 | Stop reason: HOSPADM

## 2025-05-07 RX ORDER — LIDOCAINE HYDROCHLORIDE 20 MG/ML
INJECTION, SOLUTION EPIDURAL; INFILTRATION; INTRACAUDAL; PERINEURAL AS NEEDED
Status: DISCONTINUED | OUTPATIENT
Start: 2025-05-07 | End: 2025-05-07

## 2025-05-07 RX ORDER — HYDRALAZINE HYDROCHLORIDE 20 MG/ML
5 INJECTION INTRAMUSCULAR; INTRAVENOUS EVERY 30 MIN PRN
Status: DISCONTINUED | OUTPATIENT
Start: 2025-05-07 | End: 2025-05-07 | Stop reason: HOSPADM

## 2025-05-07 RX ORDER — GLYCOPYRROLATE 0.2 MG/ML
INJECTION INTRAMUSCULAR; INTRAVENOUS AS NEEDED
Status: DISCONTINUED | OUTPATIENT
Start: 2025-05-07 | End: 2025-05-07

## 2025-05-07 RX ORDER — TAMSULOSIN HYDROCHLORIDE 0.4 MG/1
0.4 CAPSULE ORAL DAILY
Qty: 30 CAPSULE | Refills: 0 | Status: SHIPPED | OUTPATIENT
Start: 2025-05-07 | End: 2025-06-06

## 2025-05-07 RX ORDER — FAMOTIDINE 10 MG/ML
20 INJECTION, SOLUTION INTRAVENOUS ONCE
Status: DISCONTINUED | OUTPATIENT
Start: 2025-05-07 | End: 2025-05-07 | Stop reason: HOSPADM

## 2025-05-07 RX ORDER — ALBUTEROL SULFATE 0.83 MG/ML
2.5 SOLUTION RESPIRATORY (INHALATION) ONCE AS NEEDED
Status: DISCONTINUED | OUTPATIENT
Start: 2025-05-07 | End: 2025-05-07 | Stop reason: HOSPADM

## 2025-05-07 RX ORDER — PHENAZOPYRIDINE HYDROCHLORIDE 200 MG/1
200 TABLET, FILM COATED ORAL 3 TIMES DAILY
Qty: 21 TABLET | Refills: 0 | Status: SHIPPED | OUTPATIENT
Start: 2025-05-07 | End: 2025-05-14

## 2025-05-07 RX ORDER — NEOSTIGMINE METHYLSULFATE 1 MG/ML
INJECTION INTRAVENOUS AS NEEDED
Status: DISCONTINUED | OUTPATIENT
Start: 2025-05-07 | End: 2025-05-07

## 2025-05-07 RX ORDER — FUROSEMIDE 10 MG/ML
INJECTION INTRAMUSCULAR; INTRAVENOUS AS NEEDED
Status: DISCONTINUED | OUTPATIENT
Start: 2025-05-07 | End: 2025-05-07

## 2025-05-07 RX ORDER — SODIUM CHLORIDE, SODIUM LACTATE, POTASSIUM CHLORIDE, CALCIUM CHLORIDE 600; 310; 30; 20 MG/100ML; MG/100ML; MG/100ML; MG/100ML
INJECTION, SOLUTION INTRAVENOUS CONTINUOUS PRN
Status: DISCONTINUED | OUTPATIENT
Start: 2025-05-07 | End: 2025-05-07

## 2025-05-07 RX ORDER — OXYBUTYNIN CHLORIDE 5 MG/1
5 TABLET ORAL 3 TIMES DAILY
Qty: 63 TABLET | Refills: 0 | Status: SHIPPED | OUTPATIENT
Start: 2025-05-07 | End: 2025-05-28

## 2025-05-07 RX ORDER — DIPHENHYDRAMINE HYDROCHLORIDE 50 MG/ML
12.5 INJECTION, SOLUTION INTRAMUSCULAR; INTRAVENOUS ONCE AS NEEDED
Status: DISCONTINUED | OUTPATIENT
Start: 2025-05-07 | End: 2025-05-07 | Stop reason: HOSPADM

## 2025-05-07 RX ORDER — SODIUM CHLORIDE, SODIUM LACTATE, POTASSIUM CHLORIDE, CALCIUM CHLORIDE 600; 310; 30; 20 MG/100ML; MG/100ML; MG/100ML; MG/100ML
100 INJECTION, SOLUTION INTRAVENOUS CONTINUOUS
Status: DISCONTINUED | OUTPATIENT
Start: 2025-05-07 | End: 2025-05-07 | Stop reason: HOSPADM

## 2025-05-07 RX ORDER — LABETALOL HYDROCHLORIDE 5 MG/ML
5 INJECTION, SOLUTION INTRAVENOUS ONCE AS NEEDED
Status: DISCONTINUED | OUTPATIENT
Start: 2025-05-07 | End: 2025-05-07 | Stop reason: HOSPADM

## 2025-05-07 RX ORDER — ONDANSETRON HYDROCHLORIDE 2 MG/ML
4 INJECTION, SOLUTION INTRAVENOUS ONCE AS NEEDED
Status: DISCONTINUED | OUTPATIENT
Start: 2025-05-07 | End: 2025-05-07 | Stop reason: HOSPADM

## 2025-05-07 RX ORDER — HYDROMORPHONE HYDROCHLORIDE 1 MG/ML
INJECTION, SOLUTION INTRAMUSCULAR; INTRAVENOUS; SUBCUTANEOUS AS NEEDED
Status: DISCONTINUED | OUTPATIENT
Start: 2025-05-07 | End: 2025-05-07

## 2025-05-07 RX ORDER — MIDAZOLAM HYDROCHLORIDE 1 MG/ML
INJECTION, SOLUTION INTRAMUSCULAR; INTRAVENOUS AS NEEDED
Status: DISCONTINUED | OUTPATIENT
Start: 2025-05-07 | End: 2025-05-07

## 2025-05-07 RX ORDER — LABETALOL HYDROCHLORIDE 5 MG/ML
INJECTION, SOLUTION INTRAVENOUS AS NEEDED
Status: DISCONTINUED | OUTPATIENT
Start: 2025-05-07 | End: 2025-05-07

## 2025-05-07 RX ORDER — LIDOCAINE HYDROCHLORIDE 10 MG/ML
0.1 INJECTION, SOLUTION INFILTRATION; PERINEURAL ONCE
Status: DISCONTINUED | OUTPATIENT
Start: 2025-05-07 | End: 2025-05-07 | Stop reason: HOSPADM

## 2025-05-07 RX ORDER — HYDROMORPHONE HYDROCHLORIDE 0.2 MG/ML
0.2 INJECTION INTRAMUSCULAR; INTRAVENOUS; SUBCUTANEOUS EVERY 5 MIN PRN
Status: DISCONTINUED | OUTPATIENT
Start: 2025-05-07 | End: 2025-05-07 | Stop reason: HOSPADM

## 2025-05-07 RX ORDER — ROCURONIUM BROMIDE 50 MG/5 ML
SYRINGE (ML) INTRAVENOUS AS NEEDED
Status: DISCONTINUED | OUTPATIENT
Start: 2025-05-07 | End: 2025-05-07

## 2025-05-07 RX ORDER — ONDANSETRON HYDROCHLORIDE 2 MG/ML
INJECTION, SOLUTION INTRAVENOUS AS NEEDED
Status: DISCONTINUED | OUTPATIENT
Start: 2025-05-07 | End: 2025-05-07

## 2025-05-07 RX ORDER — METOCLOPRAMIDE HYDROCHLORIDE 5 MG/ML
10 INJECTION INTRAMUSCULAR; INTRAVENOUS ONCE AS NEEDED
Status: DISCONTINUED | OUTPATIENT
Start: 2025-05-07 | End: 2025-05-07 | Stop reason: HOSPADM

## 2025-05-07 RX ORDER — PROPOFOL 10 MG/ML
INJECTION, EMULSION INTRAVENOUS AS NEEDED
Status: DISCONTINUED | OUTPATIENT
Start: 2025-05-07 | End: 2025-05-07

## 2025-05-07 RX ADMIN — HYDROMORPHONE HYDROCHLORIDE 0.4 MG: 1 INJECTION, SOLUTION INTRAMUSCULAR; INTRAVENOUS; SUBCUTANEOUS at 09:08

## 2025-05-07 RX ADMIN — Medication 2 L/MIN: at 09:45

## 2025-05-07 RX ADMIN — ACETAMINOPHEN 975 MG: 325 TABLET ORAL at 07:20

## 2025-05-07 RX ADMIN — MIDAZOLAM 1 MG: 1 INJECTION INTRAMUSCULAR; INTRAVENOUS at 07:44

## 2025-05-07 RX ADMIN — DEXAMETHASONE SODIUM PHOSPHATE 8 MG: 4 INJECTION, SOLUTION INTRAMUSCULAR; INTRAVENOUS at 07:56

## 2025-05-07 RX ADMIN — Medication 20 MG: at 08:43

## 2025-05-07 RX ADMIN — NEOSTIGMINE METHYLSULFATE 5 MG: 1 INJECTION INTRAVENOUS at 09:15

## 2025-05-07 RX ADMIN — ONDANSETRON 4 MG: 2 INJECTION INTRAMUSCULAR; INTRAVENOUS at 09:13

## 2025-05-07 RX ADMIN — FENTANYL CITRATE 50 MCG: 50 INJECTION, SOLUTION INTRAMUSCULAR; INTRAVENOUS at 07:48

## 2025-05-07 RX ADMIN — LABETALOL HYDROCHLORIDE 5 MG: 5 INJECTION, SOLUTION INTRAVENOUS at 08:46

## 2025-05-07 RX ADMIN — SODIUM CHLORIDE, POTASSIUM CHLORIDE, SODIUM LACTATE AND CALCIUM CHLORIDE: 600; 310; 30; 20 INJECTION, SOLUTION INTRAVENOUS at 07:45

## 2025-05-07 RX ADMIN — CEFAZOLIN SODIUM 2 G: 2 INJECTION, SOLUTION INTRAVENOUS at 08:09

## 2025-05-07 RX ADMIN — FENTANYL CITRATE 50 MCG: 50 INJECTION, SOLUTION INTRAMUSCULAR; INTRAVENOUS at 08:08

## 2025-05-07 RX ADMIN — Medication 8 L/MIN: at 09:27

## 2025-05-07 RX ADMIN — GLYCOPYRROLATE 0.8 MG: 0.2 INJECTION, SOLUTION INTRAMUSCULAR; INTRAVENOUS at 09:15

## 2025-05-07 RX ADMIN — LIDOCAINE HYDROCHLORIDE 80 MG: 20 INJECTION, SOLUTION EPIDURAL; INFILTRATION; INTRACAUDAL; PERINEURAL at 07:49

## 2025-05-07 RX ADMIN — FUROSEMIDE 20 MG: 10 INJECTION, SOLUTION INTRAVENOUS at 09:23

## 2025-05-07 RX ADMIN — PROPOFOL 150 MG: 10 INJECTION, EMULSION INTRAVENOUS at 07:49

## 2025-05-07 RX ADMIN — LABETALOL HYDROCHLORIDE 5 MG: 5 INJECTION, SOLUTION INTRAVENOUS at 08:30

## 2025-05-07 RX ADMIN — Medication 50 MG: at 07:50

## 2025-05-07 SDOH — HEALTH STABILITY: MENTAL HEALTH: CURRENT SMOKER: 0

## 2025-05-07 ASSESSMENT — PAIN - FUNCTIONAL ASSESSMENT
PAIN_FUNCTIONAL_ASSESSMENT: 0-10

## 2025-05-07 ASSESSMENT — COLUMBIA-SUICIDE SEVERITY RATING SCALE - C-SSRS
2. HAVE YOU ACTUALLY HAD ANY THOUGHTS OF KILLING YOURSELF?: NO
1. IN THE PAST MONTH, HAVE YOU WISHED YOU WERE DEAD OR WISHED YOU COULD GO TO SLEEP AND NOT WAKE UP?: NO
6. HAVE YOU EVER DONE ANYTHING, STARTED TO DO ANYTHING, OR PREPARED TO DO ANYTHING TO END YOUR LIFE?: NO

## 2025-05-07 ASSESSMENT — PAIN SCALES - GENERAL
PAINLEVEL_OUTOF10: 0 - NO PAIN

## 2025-05-07 NOTE — DISCHARGE INSTRUCTIONS
[facility]    DISCHARGE INSTRUCTIONS     Percutaneous Nephrolithotomy (PCNL)    Beth Quinones    Call [phone] during regular daytime business hours (8:00 am - 5:00 pm) and after 5:00 pm and ask for the Urology resident with any questions or concerns.    If it is a life-threatening situation, proceed to the nearest emergency department.        Follow-up appointment:  5/19/25      Thank you for the opportunity to care for you today.  Your health and healing are very important to us.  We hope we made you feel as comfortable as possible and are committed to your recovery and continued well-being.      The following is a brief overview of your PCNL(percutaneous nephrolithotomy) procedure today. Some of the information contained on this summary may be confidential.  This information should be kept in your records and should be shared with your regular doctor.    Physicians:   Dr. Hatfield    Procedure performed: Lasering of your kidney stones through access gained in your back      What to Expect During your Recovery and Home Care  Anesthesia Side Effects   You received anesthesia today.  You may feel sleepy, tired, or have a sore throat.   You may also feel drowsiness, dizziness, or inability to think clearly.  For your safety, do not drive, drink alcoholic beverages, take any unprescribed medication or make any important decisions for 24 hours.  A responsible adult should be with you for 24 hours.        Activity and Recovery    No heavy lifting. Rest for the next 24 hours.     Pain Control  Unfortunately, you may experience pain after your procedure.  Frequency and urgency to urinate and mild discomfort are expected. Adequate pain management can include alternative measures to help ease your pain and that can include over the counter Tylenol/ibuprofen and a heating pad or oxycodone which can be taken as prescribed as needed for breakthrough pain. Do not take more than 4,000mg of Tylenol in a 24-hour  period.      You may have also been prescribed Pyridium (for burning sensation) and Ditropan(for bladder spasms) for pain control. Pyridium will turn your urine bright orange.    Nausea/Vomiting   Clear liquids are best tolerated at first. Start slow, advance your diet as tolerated to normal foods. Avoid spicy, greasy, heavy foods at first. Also, you may feel nauseous or like you need to vomit if you take any type of medication on an empty stomach.  Call your physician if you are unable to eat or drink and have persistent vomiting.    Signs of Bleeding   You could have some blood in your urine off and on over the next several weeks. Your urine will be light pink to yellow.    Treatment/wound care:   It is okay to shower 24 hours after time of surgery. If you go home with a tube(s) in your back do not submerge them in water. (no tub bathing, or swimming)    Signs of Infection  Signs of infection can include fever, chills, burning sensation with passing of urine, or severe abdominal pain.  If you see any of these occur, please contact your doctor's office at [phone].  Any fever higher than 100.4, especially if associated with an ill feeling, abdominal pain, chills, or nausea should be reported to your surgeon.      Assist in bowel movements/urination  Increase fiber in diet  Increase water (6 to 8 glasses)  Increase walking   Urination should occur within 6 hours of anesthesia  If you have tried these methods and your bladder still feels full and you cannot use the bathroom, please go to your nearest Emergency room/contact your physician.    Additional Instructions:   Pieces of your kidney stone may pass in the urine for a few days and may cause some mild pain. You also had a stent placed today from your kidney down into your bladder. This helps keep the urinary tract open and prevents blockages of urine flow. The stent can be irritating and can cause some frequency, urgency and blood in your urine when you go to  the bathroom. It is important to drink plenty of water and take medications as prescribed. You may be sent home with Pyridium which turns your urine bright orange. Applying a heating pad to your back will also help with this kind of pain. It will be determined at your follow up appointment when the stent will be removed.     You may also go home with your nephrostomy tube (tube in your back). Dressings for this tube should be changed every three days. Clean around insertion site with alcohol wipe, cover with 4x4 gauze. Monitor for infection at insertion site with dressing change. Signs of infection include green/yellow drainage, swelling, warmth, redness, fever and chills.

## 2025-05-07 NOTE — H&P
"History Of Present Illness  70yo f with left nephrolithiasis here for left miniPCNL. No recent change in health    Past Medical History  Medical History[1]     Surgical History  Surgical History[2]     Social History  She reports that she has never smoked. She has been exposed to tobacco smoke. She has never used smokeless tobacco. She reports that she does not drink alcohol and does not use drugs.    Family History  Family History[3]     Allergies  Patient has no known allergies.    ROS: 12 system review was completed and is negative with the exception of those signs and symptoms noted in the history of present illness: A 12 system review was completed and is negative with the exception of those signs and symptoms noted in the history of present illness.     Exam:  General: in NAD, appears stated age  Head: normocephalic, atraumatic  Respiratory: normal effort, no use of accessory muscles  Cardiovascular: no edema noted  Skin: normal turgor, no rashes  Neurologic: grossly intact, oriented to person/place/time  Psychiatric: mode and affect appropriate    Per anesthesiology, clear to auscultation bilaterally with a regular rate and rhythm     Last Recorded Vitals  /79   Pulse 70   Temp 36.3 °C (97.3 °F) (Temporal)   Resp 18   Ht 1.524 m (5')   Wt 90.3 kg (199 lb)   SpO2 95%   BMI 38.86 kg/m²       No results found for: \"URINECULTURE\", \"CREATININE\"      ASSESSMENT/PLAN:  Okay to proceed with left miniPCNL    Oral Hatfield MD         [1]   Past Medical History:  Diagnosis Date    Acute maxillary sinusitis, unspecified 04/20/2022    Acute non-recurrent maxillary sinusitis    Acute non-recurrent maxillary sinusitis 02/13/2023    Anxiety     Arthritis     Chronic sinusitis, unspecified 12/16/2020    Sinobronchitis    GERD (gastroesophageal reflux disease)     High cholesterol     Hypertension     Pain, unspecified     Pain    Personal history of other specified conditions 05/27/2015    History of abnormal " mammogram    PONV (postoperative nausea and vomiting)     Snores     Vertigo     Wears glasses     reading   [2]   Past Surgical History:  Procedure Laterality Date    BREAST BIOPSY Right     CHOLECYSTECTOMY  02/09/2015    COLONOSCOPY      ESOPHAGOGASTRODUODENOSCOPY      HYSTERECTOMY  02/09/2015    MOUTH SURGERY      tooth removed   [3]   Family History  Problem Relation Name Age of Onset    Colon cancer Mother [place]     Osteoporosis Mother [place]     Thyroid disease Mother [place]     Heart disease Father Humphrey     Breast cancer Maternal Grandmother Grandma Beth     Heart disease Brother Gustavo Sanon     Heart disease Brother Gustavo Sanon

## 2025-05-07 NOTE — ANESTHESIA PREPROCEDURE EVALUATION
Patient: Beth Quinones    Procedure Information       Anesthesia Start Date/Time: 05/07/25 0745    Procedure: Nephrolithotomy - miniPCNL (Left) - Needs miniPCNL scope set    [facility]  / [facility] OR    Surgeons: Oral Hatfield MD            Relevant Problems   Cardiac   (+) Essential (primary) hypertension   (+) Hypertension      Neuro   (+) Anxiety disorder   (+) Carpal tunnel syndrome, right upper limb      /Renal   (+) Left nephrolithiasis   (+) Left renal stone   (+) Pyelonephritis      Hematology   (+) Iron deficiency anemia due to chronic blood loss      Musculoskeletal   (+) Carpal tunnel syndrome, right upper limb   (+) Primary osteoarthritis of both knees      HEENT   (+) Acute bacterial sinusitis   (+) Acute non-recurrent maxillary sinusitis      ID   (+) Acute bacterial sinusitis   (+) Pyelonephritis   (+) Yeast vaginitis       Clinical information reviewed:   Tobacco  Allergies  Meds   Med Hx  Surg Hx  OB Status  Fam Hx  Soc   Hx        NPO Detail:  NPO/Void Status  Carbohydrate Drink Given Prior to Surgery? : N  Date of Last Liquid: 05/06/25  Time of Last Liquid: 2100  Date of Last Solid: 05/06/25  Time of Last Solid: 2100  Last Intake Type: Food  Time of Last Void: 0600         Physical Exam    Airway  Mallampati: II  TM distance: >3 FB  Neck ROM: full  Mouth opening: 3 or more finger widths     Cardiovascular   Rhythm: regular  Rate: normal     Dental - normal exam     Pulmonary Breath sounds clear to auscultation     Abdominal            Anesthesia Plan    History of general anesthesia?: yes  History of complications of general anesthesia?: no    ASA 3     general     The patient is not a current smoker.    intravenous induction   Postoperative pain plan includes opioids.  Anesthetic plan and risks discussed with patient.    Plan discussed with CRNA.

## 2025-05-07 NOTE — ANESTHESIA PROCEDURE NOTES
Airway  Date/Time: 5/7/2025 7:52 AM  Reason: elective    Airway not difficult    Staffing  Performed: SRNA and CRNA   Authorized by: Mata García    Performed by: Mata García  Patient location during procedure: OR    Patient Condition  Indications for airway management: anesthesia and airway protection  Patient position: sniffing  Planned trial extubation  Sedation level: deep     Final Airway Details   Preoxygenated: yes  Final airway type: endotracheal airway  Successful airway: ETT  Cuffed: yes   Successful intubation technique: direct laryngoscopy  Endotracheal tube insertion site: oral  Blade: Otis  Blade size: #4  ETT size (mm): 7.0  Cormack-Lehane Classification: grade I - full view of glottis  Placement verified by: chest auscultation and capnometry   Inital cuff pressure (cm H2O): 21  Measured from: lips  ETT to lips (cm): 24  Ventilation between attempts: none  Number of attempts at approach: 1  Number of other approaches attempted: 0

## 2025-05-07 NOTE — ANESTHESIA POSTPROCEDURE EVALUATION
Patient: Beth Quinones    Procedure Summary       Date: 05/07/25 Room / Location: [facility] OR 08 / Virtual [facility] OR    Anesthesia Start: 0745 Anesthesia Stop: 0929    Procedure: Nephrolithotomy - miniPCNL (Left: Back) Diagnosis:       Left nephrolithiasis      (Left nephrolithiasis [N20.0])    Surgeons: Oral Hatfield MD Responsible Provider: César Cotton MD    Anesthesia Type: general ASA Status: 3            Anesthesia Type: general    Vitals Value Taken Time   /82 05/07/25 10:15   Temp 36.2 °C (97.2 °F) 05/07/25 09:27   Pulse 56 05/07/25 10:24   Resp 17 05/07/25 10:24   SpO2 100 % 05/07/25 10:24   Vitals shown include unfiled device data.    Anesthesia Post Evaluation    Patient location during evaluation: PACU  Patient participation: complete - patient participated  Level of consciousness: awake and alert  Pain management: satisfactory to patient  Multimodal analgesia pain management approach  Airway patency: patent  Cardiovascular status: acceptable  Respiratory status: acceptable  Hydration status: acceptable  Postoperative Nausea and Vomiting: none        No notable events documented.

## 2025-05-07 NOTE — OP NOTE
Nephrolithotomy - miniPCNL (L) Operative Note     Date: 2025  OR Location: [facility] OR    Name: Beth Quinones, : 1955, Age: 69 y.o., MRN: [id number], Sex: female    Diagnosis  Pre-op Diagnosis      * Left nephrolithiasis [N20.0] Post-op Diagnosis     * Left nephrolithiasis [N20.0]     Procedures  Nephrolithotomy - miniPCNL  30209 - ND PERQ NL/PL LITHOTRP COMPLEX >2 CM MLT LOCATIONS      Surgeons      * Oral Hatfield - Primary    Resident/Fellow/Other Assistant:  Surgeons and Role:  * No surgeons found with a matching role *    Staff:   Surgical Assistant:   Circulator: Velvet Walters Person: Alicia Walters Person: Lorena  Circulator: Shanta    Anesthesia Staff: Anesthesiologist: César Cotton MD  CRNA: ROSAMARIA Bennett-CRNA  SRNA: Mata García    Procedure Summary  Anesthesia: General  ASA: III  Estimated Blood Loss: 10mL  Intra-op Medications:   Administrations occurring from 0800 to 1030 on 25:   Medication Name Total Dose   BUPivacaine HCl (Marcaine) 0.5 % (5 mg/mL) injection 14 mL   sodium chloride 0.9 % irrigation solution 2,000 mL   ceFAZolin (Ancef) 2 g in dextrose (iso)  mL 2 g   fentaNYL (Sublimaze) injection 50 mcg/mL 50 mcg   furosemide (Lasix) injection 20 mg   glycopyrrolate (Robinul) injection 0.8 mg   HYDROmorphone (Dilaudid) injection 1 mg/mL 0.4 mg   labetalol (Normodyne,Trandate) injection 10 mg   LR infusion Cannot be calculated   neostigmine (Bloxiverz) 10 mg/10 mL injection 5 mg   ondansetron (Zofran) 2 mg/mL injection 4 mg   rocuronium (Zemuron) 50 mg/5 mL prefilled syringe 20 mg              Anesthesia Record               Intraprocedure I/O Totals          Intake    LR infusion 700.00 mL    ceFAZolin (Ancef) 2 g in dextrose (iso)  mL 100.00 mL    Total Intake 800 mL          Specimen:   ID Type Source Tests Collected by Time   A : LEFT RENAL CALCULI Calculus Urine, Clean Catch CALCULI (STONE) ANALYSIS Oral Hatfield MD 2025 0906                  Drains and/or Catheters:   Urethral Catheter Non-latex 16 Fr. (Active)       Tourniquet Times:         Implants:  Implants       Type Name Action Serial No.      Stent STENT, URETERAL PERCUFLEX 6 X 24 [id number] - [id number] Implanted             Operative findings:  Stone size: 1.5 cm at left UPJ, 1 cm x 3 left lower pole.  In total, 4 stones  Access location: Upper pole via previously placed nephrostomy tube by interventional radiology  Access approach: Via previously placed nephrostomy tube  Sheath size: 17.5 Djiboutian miniature PCNL  Lithotripter: Holmium laser, 550 µm  Stone clearance: Excellent    Operative indications: 69-year-old female who was recently admitted at [facility] with sepsis secondary to a 1.5 cm stone at the left UPJ with urinary tract infection as well as additional lower pole calculi totaling roughly 1 to 3 cm in size.  She had a nephrostomy tube placed at that time and was treated with antibiotics and now returns for definitive management.  Signed consent was obtained after thorough discussion of the risks and benefits.    The patient was correctly identified and the operative plan was confirmed with the patient and the operative team. A weight appropriate dose of prophylactic antibiotics was administered intravenously prior to the procedure and sequential compression devices were applied to the lower extremities and activated prior to induction of anesthesia.  General anesthesia was induced. A 16 Djiboutian Khan catheter was placed.    Patient was then repositioned in prone position and prepped in standard fashion.    An antegrade pyelogram was performed via the previously placed nephrostomy tube which showed upper pole access with a filling defect at the UPJ consistent with the known stone, it was not frankly radiopaque.  I advanced a sensor wire through the nephrostomy tube and advanced it ultimately down into the bladder with some careful manipulation.  The nephrostomy tube was removed and  discarded.  Over the wire, I advanced a 5 Malawian open-ended catheter into the bladder and replaced the sensor wire with a Super Stiff wire.  I then removed the 5 Malawian catheter.  A dual-lumen catheter was advanced over this and a sensor wire was deployed in the renal pelvis such that we had a working wire and a safety wire.    Over the Super Stiff wire, I dilated using a single step dilator to 16.5 Malawian under fluoroscopic guidance.  I then advanced a 17.5 Malawian sheath into the upper pole calyx under fluoroscopic guidance.  I then removed the dilator and inserted the nephroscope.    We identified the above listed stones, including a stone at the UPJ and the lower pole.  Lithotripsy was performed using the holmium laser with a 550 µm laser fiber.      Flexible pyeloscopy was performed and any remaining fragments were irrigated into the UPJ for subsequent removal.  I also advanced the flexible ureteroscope down into the ureter to clear the ureter, it was clear of any fragments down to the bladder.  I removed the flexible ureteroscope and reinserted the rigid nephroscope to clear the stone fragments left in the renal pelvis.  Once the stones were cleared I advanced a wire down into the bladder.  Over the wire advanced a 6 Malawian by 24 cm JJ stent with good proximal and distal curls confirmed fluoroscopically.  I then removed the sheath inspecting the tissue on the way out, I did not visualize any concerning findings on the way out or active bleeding.  Over the remaining safety wire advanced a dual-lumen catheter and performed an antegrade nephrostogram which showed no extravasation and good antegrade flow.  The wire and dual-lumen were removed.    The incision was closed with 4-0 Vicryl.    The patient was then repositioned in supine position.  The calculi were sent for analysis.  The patient tolerated the procedure well.  The patient was extubated and brought to the PACU in good condition.      Oral ADAMS  Liu  Phone Number: [phone]

## 2025-05-12 ENCOUNTER — APPOINTMENT (OUTPATIENT)
Dept: PRIMARY CARE | Facility: CLINIC | Age: 70
End: 2025-05-12
Payer: MEDICARE

## 2025-05-12 VITALS
TEMPERATURE: 97 F | BODY MASS INDEX: 39.07 KG/M2 | WEIGHT: 199 LBS | HEIGHT: 60 IN | SYSTOLIC BLOOD PRESSURE: 122 MMHG | RESPIRATION RATE: 16 BRPM | HEART RATE: 67 BPM | DIASTOLIC BLOOD PRESSURE: 76 MMHG | OXYGEN SATURATION: 97 %

## 2025-05-12 DIAGNOSIS — I10 ESSENTIAL (PRIMARY) HYPERTENSION: ICD-10-CM

## 2025-05-12 DIAGNOSIS — Z87.448 HISTORY OF PYELONEPHRITIS: Primary | ICD-10-CM

## 2025-05-12 DIAGNOSIS — N20.0 LEFT RENAL STONE: ICD-10-CM

## 2025-05-12 DIAGNOSIS — E78.5 DYSLIPIDEMIA: ICD-10-CM

## 2025-05-12 LAB
APPEARANCE STONE: NORMAL
COMPN STONE: NORMAL
SPECIMEN WT: 786 MG

## 2025-05-12 PROCEDURE — 1036F TOBACCO NON-USER: CPT | Performed by: FAMILY MEDICINE

## 2025-05-12 PROCEDURE — 3078F DIAST BP <80 MM HG: CPT | Performed by: FAMILY MEDICINE

## 2025-05-12 PROCEDURE — 99213 OFFICE O/P EST LOW 20 MIN: CPT | Performed by: FAMILY MEDICINE

## 2025-05-12 PROCEDURE — 3008F BODY MASS INDEX DOCD: CPT | Performed by: FAMILY MEDICINE

## 2025-05-12 PROCEDURE — 1159F MED LIST DOCD IN RCRD: CPT | Performed by: FAMILY MEDICINE

## 2025-05-12 PROCEDURE — 1160F RVW MEDS BY RX/DR IN RCRD: CPT | Performed by: FAMILY MEDICINE

## 2025-05-12 PROCEDURE — 3074F SYST BP LT 130 MM HG: CPT | Performed by: FAMILY MEDICINE

## 2025-05-12 NOTE — PROGRESS NOTES
Subjective   Patient ID: Beth Quinones is a 69 y.o. female who presents for Nephrolithiasis (2 month follow up ).  HPI  Pleasant  69-year-old female is here status post lithotripsy and removal of the large stone and smaller stones in her left kidney went home on Keflex and Pyridium and was pretty much only on Pyridium if needed for pain if she has an indwelling the ureteral stent which will be removed next week.  Otherwise no significant flank pain high fever shaking chills dysuria or hematuria  Remains on her metoprolol for blood pressure and BuSpar if needed for nerves  It has been off Lipitor while which she was on may return to Lipitor at this time  Gratefully no chest pain short of breath or palpitations.  During good routines will follow-up with urology at their discretion.  Review of Systems   Constitutional:  Negative for fatigue and fever.   Eyes:  Negative for visual disturbance.   Respiratory:  Negative for cough, chest tightness, shortness of breath and wheezing.    Cardiovascular:  Negative for chest pain, palpitations and leg swelling.   Gastrointestinal:  Negative for abdominal pain and blood in stool.   Genitourinary:  Positive for frequency. Negative for dysuria, flank pain and hematuria.   Musculoskeletal:  Positive for arthralgias and myalgias. Negative for joint swelling.   Skin:  Negative for rash.   Neurological:  Negative for weakness, light-headedness, numbness and headaches.   Psychiatric/Behavioral:  Negative for behavioral problems, confusion, sleep disturbance and suicidal ideas. The patient is nervous/anxious.        Objective   /76 (BP Location: Right arm, Patient Position: Sitting, BP Cuff Size: Adult)   Pulse 67   Temp 36.1 °C (97 °F) (Temporal)   Resp 16   Ht 1.524 m (5')   Wt 90.3 kg (199 lb)   SpO2 97%   BMI 38.86 kg/m²           Physical Exam  Vital signs reviewed and normal good blood pressure good pulse ox  General- pleasant interactive dividual in no acute  distress  Neck neck is supple no mass adenopathy bruits rigidity  Cardiovascular RSR without significant murmurs gallop or ectopy  Pulmonary chest is clear anteriorly and posteriorly  GI incisions small in the left flank area is healing very nicely no flank tenderness bilaterally no rebound  Peripheral vascular distal pulses are intact no symmetric or asymmetric edema  Mood-mood is stable without anxiety depressive or cognitive issue    Assessment/Plan   Problem List Items Addressed This Visit    None  Patient is done well post lithotripsy and will see a urologist for removal of the ureteral stent on the left side--  Continue metoprolol and low-salt diet for hypertension  May resume Lipitor since she is off Bactrim and Cipro.  Will recheck in about 1month follow-up blood pressure health maintenance otherwise continue SBE no change to date  No GI red flags and will continue good low salt and low fat diet  @discharge  The above diagnosis and treatment plan was discussed with the patient patient will continue appropriate diet and exercise as reviewed  Patient will recheck earlier if any interval problems of significance or clinical worsening of the above problems.  Agrees above surveillance.  All question were addressed regarding above meds

## 2025-05-15 PROBLEM — Z87.448 HISTORY OF PYELONEPHRITIS: Status: ACTIVE | Noted: 2025-05-15

## 2025-05-15 RX ORDER — LIDOCAINE HYDROCHLORIDE 20 MG/ML
1 JELLY TOPICAL ONCE
Status: COMPLETED | OUTPATIENT
Start: 2025-05-19 | End: 2025-05-19

## 2025-05-15 ASSESSMENT — ENCOUNTER SYMPTOMS
SHORTNESS OF BREATH: 0
PALPITATIONS: 0
CONFUSION: 0
COUGH: 0
FLANK PAIN: 0
WEAKNESS: 0
HEADACHES: 0
ABDOMINAL PAIN: 0
FEVER: 0
FREQUENCY: 1
BLOOD IN STOOL: 0
DYSURIA: 0
MYALGIAS: 1
NUMBNESS: 0
NERVOUS/ANXIOUS: 1
WHEEZING: 0
FATIGUE: 0
SLEEP DISTURBANCE: 0
LIGHT-HEADEDNESS: 0
CHEST TIGHTNESS: 0
JOINT SWELLING: 0
ARTHRALGIAS: 1
HEMATURIA: 0

## 2025-05-19 ENCOUNTER — APPOINTMENT (OUTPATIENT)
Dept: RADIOLOGY | Facility: HOSPITAL | Age: 70
End: 2025-05-19
Payer: MEDICARE

## 2025-05-19 ENCOUNTER — APPOINTMENT (OUTPATIENT)
Dept: UROLOGY | Facility: CLINIC | Age: 70
End: 2025-05-19
Payer: MEDICARE

## 2025-05-19 VITALS
TEMPERATURE: 97.5 F | DIASTOLIC BLOOD PRESSURE: 87 MMHG | SYSTOLIC BLOOD PRESSURE: 148 MMHG | WEIGHT: 200 LBS | BODY MASS INDEX: 39.27 KG/M2 | HEIGHT: 60 IN | HEART RATE: 77 BPM

## 2025-05-19 DIAGNOSIS — N20.0 KIDNEY STONES: ICD-10-CM

## 2025-05-19 LAB
POC APPEARANCE, URINE: CLEAR
POC BILIRUBIN, URINE: NEGATIVE
POC BLOOD, URINE: ABNORMAL
POC COLOR, URINE: YELLOW
POC GLUCOSE, URINE: NEGATIVE MG/DL
POC KETONES, URINE: NEGATIVE MG/DL
POC LEUKOCYTES, URINE: ABNORMAL
POC NITRITE,URINE: NEGATIVE
POC PH, URINE: 5.5 PH
POC PROTEIN, URINE: ABNORMAL MG/DL
POC SPECIFIC GRAVITY, URINE: 1.01
POC UROBILINOGEN, URINE: 0.2 EU/DL

## 2025-05-19 PROCEDURE — 81003 URINALYSIS AUTO W/O SCOPE: CPT | Performed by: UROLOGY

## 2025-05-19 PROCEDURE — 52310 CYSTOSCOPY AND TREATMENT: CPT | Performed by: UROLOGY

## 2025-05-19 RX ADMIN — LIDOCAINE HYDROCHLORIDE 1 APPLICATION: 20 JELLY TOPICAL at 08:45

## 2025-05-19 ASSESSMENT — PATIENT HEALTH QUESTIONNAIRE - PHQ9
2. FEELING DOWN, DEPRESSED OR HOPELESS: NOT AT ALL
SUM OF ALL RESPONSES TO PHQ9 QUESTIONS 1 AND 2: 0
1. LITTLE INTEREST OR PLEASURE IN DOING THINGS: NOT AT ALL

## 2025-05-19 NOTE — PROGRESS NOTES
PAST UROLOGICAL HISTORY:  69-year-old woman admitted to [facility] on 03/24/2025 with sepsis and a large kidney stone. Initially managed with percutaneous tube placement, followed by mini percutaneous nephrolithotomy (PCNL) on 05/07/2025. Multiple stones were removed including a 1.5 cm stone and three 1 cm stones. Stone analysis revealed a mixture of calcium oxalate and calcium phosphate stones.    Imaging, laboratory results, and operative reports were independently reviewed and interpreted as noted herein.    HPI TODAY 05/19/2025:    Nephrolithiasis:  - Patient presents today for stent removal following mini PCNL performed on 05/07/2025.  - All stones appeared to be completely cleared during the procedure.  - Patient reports doing well post-operatively with no significant post-operative pain, fevers, chills, or hematuria.    REVIEW OF SYSTEMS: A tailored review of systems was performed and all pertinent positives and negatives are listed in the HPI.    PHYSICAL EXAMINATION:  Gen: NAD  Pulm: No increased WOB on RA  Cards: [facility]  Patient ID: Beth Quinones is a 69 y.o. female.    Cystoscopy    Date/Time: 5/19/2025 9:02 AM    Performed by: Oral Hatfield MD  Authorized by: Oral Hatfield MD      Comments:      Patient's genitalia were prepped and draped in the usual sterile fashion. A 15 Tajik flexible cystoscope was passed atraumatically per the urethra. We entered the bladder and identified the previously placed ureteral stent emanating from the left ureteral orifice. It was grasped with a flexible grasper and removed intact. Patient tolerated the procedure without difficulty.          ASSESSMENT/PLAN:    Nephrolithiasis (N20.0)  - Assessment: Ms. Quinones is status post mini PCNL on 05/07/2025 for multiple calcium-based kidney stones. She has done well post-operatively without complications.  - Plan:    - Stent removal performed today    - Renal/bladder ultrasound in 6 weeks to evaluate for silent obstruction    -  Follow-up with Meño to discuss kidney stone prevention  - Counseling: Discussed kidney stone prevention strategies including increased fluid intake, citrus consumption, and low sodium diet. Advised that post-treatment ultrasounds may occasionally show stone fragments that could represent false positives, which would be monitored with follow-up imaging in 6 months if needed.

## 2025-06-16 ENCOUNTER — APPOINTMENT (OUTPATIENT)
Dept: PRIMARY CARE | Facility: CLINIC | Age: 70
End: 2025-06-16
Payer: MEDICARE

## 2025-06-16 VITALS
WEIGHT: 202 LBS | OXYGEN SATURATION: 97 % | SYSTOLIC BLOOD PRESSURE: 124 MMHG | HEIGHT: 60 IN | HEART RATE: 63 BPM | TEMPERATURE: 97 F | BODY MASS INDEX: 39.66 KG/M2 | RESPIRATION RATE: 16 BRPM | DIASTOLIC BLOOD PRESSURE: 76 MMHG

## 2025-06-16 DIAGNOSIS — Z87.448 HISTORY OF PYELONEPHRITIS: ICD-10-CM

## 2025-06-16 DIAGNOSIS — I10 ESSENTIAL (PRIMARY) HYPERTENSION: Primary | ICD-10-CM

## 2025-06-16 DIAGNOSIS — N20.0 LEFT RENAL STONE: ICD-10-CM

## 2025-06-16 DIAGNOSIS — E78.5 DYSLIPIDEMIA: ICD-10-CM

## 2025-06-16 PROCEDURE — 99213 OFFICE O/P EST LOW 20 MIN: CPT | Performed by: FAMILY MEDICINE

## 2025-06-16 PROCEDURE — 1159F MED LIST DOCD IN RCRD: CPT | Performed by: FAMILY MEDICINE

## 2025-06-16 PROCEDURE — 3074F SYST BP LT 130 MM HG: CPT | Performed by: FAMILY MEDICINE

## 2025-06-16 PROCEDURE — 1160F RVW MEDS BY RX/DR IN RCRD: CPT | Performed by: FAMILY MEDICINE

## 2025-06-16 PROCEDURE — 3078F DIAST BP <80 MM HG: CPT | Performed by: FAMILY MEDICINE

## 2025-06-16 PROCEDURE — 3008F BODY MASS INDEX DOCD: CPT | Performed by: FAMILY MEDICINE

## 2025-06-16 PROCEDURE — 1036F TOBACCO NON-USER: CPT | Performed by: FAMILY MEDICINE

## 2025-06-16 NOTE — PROGRESS NOTES
Subjective   Patient ID: Beth Quinones is a 69 y.o. female who presents for Hypertension (4 week follow up ) and Hyperlipidemia.  HPI  Pleasant newly  with history of dyslipidemia doing well on Lipitor  Does take 50 mg metoprolol daily for hypertension he denies any recent chest pain shortness with or palpitations  Followed by urology and status post lithotripsy for left renal stone no hematuria no flank pain dysuria has done very well  History of borderline sugar when in the hospital and recent sugar was good at 105 with normal kidney functions  Earlier anemia from surgery is markedly improved maintains on her multivitamin with iron.  Otherwise very active without exertional resting chest pain shortness of breath or palpitations importance of diet weight loss reviewed earlier Chris is resolved last urine culture is negative regularly by urology.  Also follow-up mammogram with her GYN.  Last diagnostic was normal.  Will believe will be checking again in about 3 to 4 months  No change in SBE  No GI red flags does need consideration for colonoscopy or Cologuard she will consider when I see her in 2 to 3 months    Review of Systems   Constitutional:  Negative for fatigue, fever and unexpected weight change.   Eyes:  Negative for visual disturbance.   Respiratory:  Negative for cough, chest tightness and shortness of breath.    Cardiovascular:  Negative for chest pain, palpitations and leg swelling.   Gastrointestinal:  Negative for abdominal pain, blood in stool, nausea, rectal pain and vomiting.   Genitourinary:  Positive for frequency and urgency. Negative for dysuria, flank pain and hematuria.   Musculoskeletal:  Positive for arthralgias and myalgias.   Skin:  Negative for rash.   Neurological:  Negative for weakness, light-headedness, numbness and headaches.   Psychiatric/Behavioral:  Negative for behavioral problems, confusion, sleep disturbance and suicidal ideas.        Objective   /76 (BP Location:  Right arm, Patient Position: Sitting, BP Cuff Size: Large adult)   Pulse 63   Temp 36.1 °C (97 °F) (Temporal)   Resp 16   Ht 1.524 m (5')   Wt 91.6 kg (202 lb)   SpO2 97%   BMI 39.45 kg/m²           Order: [id number]   Status: Final result       Dx: Left nephrolithiasis    Test Result Released: Yes (seen)    0 Result Notes            Component  Ref Range & Units 1 mo ago  (4/21/25) 2 mo ago  (3/26/25) 2 mo ago  (3/25/25) 2 mo ago  (3/22/25) 3 mo ago  (3/21/25) 3 mo ago  (3/20/25) 3 mo ago  (3/19/25)   WBC  4.4 - 11.3 x10*3/uL 8.3 8.9 9.8 11.9 High  16.1 High  24.2 High  32.1 High    nRBC  0.0 - 0.0 /100 WBCs 0.0 0.0 0.0 0.0 0.0 0.0 0.0   RBC  4.00 - 5.20 x10*6/uL 3.83 Low  3.24 Low  3.28 Low  3.04 Low  3.39 Low  3.31 Low  3.42 Low    Hemoglobin  12.0 - 16.0 g/dL 11.5 Low  9.8 Low  10.0 Low  9.3 Low  10.3 Low  10.2 Low  10.7 Low    Hematocrit  36.0 - 46.0 % 36.3 29.9 Low  30.1 Low  26.8 Low  29.9 Low  29.2 Low  30.2 Low    MCV  80 - 100 fL 95 92 92 88 88 88 88   MCH  26.0 - 34.0 pg 30.0 30.2 30.5 30.6 30.4 30.8 31.3   MCHC  32.0 - 36.0 g/dL 31.7 Low  32.8 33.2 34.7 34.4 34.9 35.4   RDW  11.5 - 14.5 % 14.4 15.6 High  15.9 High  15.7 High  15.6 High  15.4 High  14.9 High    Platelets  150 - 450 x10*3/uL 304             Contains abnormal data Basic Metabolic Panel  Order: [id number]   Status: Final result       Dx: Left nephrolithiasis    Test Result Released: Yes (seen)    0 Result Notes            Component  Ref Range & Units 1 mo ago  (4/21/25) 2 mo ago  (3/26/25) 2 mo ago  (3/25/25) 2 mo ago  (3/23/25) 2 mo ago  (3/22/25) 3 mo ago  (3/21/25) 3 mo ago  (3/20/25)   Glucose  74 - 99 mg/dL 105 High  115 High  109 High  110 High  117 High  110 High  115 High    Sodium  136 - 145 mmol/L 137 139 141 139 137 137 136   Potassium  3.5 - 5.3 mmol/L 4.5 3.4 Low  3.1 Low  3.0 Low  4.8 CM 3.9 CM 3.1 Low    Chloride  98 - 107 mmol/L 103 104 104 105 105 107 104   Bicarbonate  21 - 32 mmol/L 25 28 27 26 24 21 22   Anion Gap  10  - 20 mmol/L 14 10 13 11 13 13 13   Urea Nitrogen  6 - 23 mg/dL 16 13 12 17 16 19 23   Creatinine  0.50 - 1.05 mg/dL 1.12 High  0.92 0.91 0.94 0.98 1.21 High  1.46 High    eGFR  >60 mL/min/1.73m*2 53 Low  67 CM 68 CM 65 CM 62 CM 48 Low  CM 39 Low  CM   Comment: Calculations of estimated GFR are performed using the 2021 CKD-EPI Study Refit equation without the race variable for the IDMS-Traceable creatinine methods.  https://jasn.asnjournals.org/content/early/2021/09/22/ASN.9661358520   Calcium  8.6 - 10.3 mg/dL 9.6 8.4 Low  8.5 Low  8.2 Low  8.0 Low  8.3 Low  8.2 Low    [facility]           LDL cholesterol, direct  Order: [id number]   Status: Final result       Dx: Dyslipidemia    Test Result Released: Yes (seen)    2 Result Notes       1  Topic         Component  Ref Range & Units 1 yr ago 2 yr ago 3 yr ago 5 yr ago   LDL, Direct  0 - 129 mg/dL 86 105   CM   Resulting Agency              Physical Exam  Vital sign reviewed normal pulse ox good  General Inspection reveals a more comfortable individual in no acute distress  Neck neck is supple without mass adenopathy bruits or rigidity  Cardiovascular regular sinus rhythm without significant murmurs gallop or ectopy  Pulmonary chest clear anteriorly and posteriorly  Abdominal gratefully no CVA tenderness specifically no left flank pain no suprapubic tenderness remaining abdomen is negative for organomegaly mass rebound  Peripheral vascular no symmetric asymmetric edema pulses are intact  Mood mood is stable anxiety depressive or cognitive issues  Earlier labs reviewed continue multivitamin with iron      Assessment/Plan   Problem List Items Addressed This Visit       Left renal stone    History of pyelonephritis   Continue her metoprolol low-salt diet  Can you follow-up with urology and increasing liquids  Continue Lipitor and will check BMP LDL before seen next see in 3 months we will also try to get updated colonoscopy  and/or Cologuard if declines scope for screening at this time  Follow-up in GYN  Well and aware of ER parameters regarding recurrent kidney stones  Continue low-salt diet low-fat diet  @discharge  The above diagnosis and treatment plan was discussed with the patient patient will continue appropriate diet and exercise as reviewed  Patient will recheck earlier if any interval problems of significance or clinical worsening of the above problems.  Agrees above surveillance.  All question were addressed regarding above meds

## 2025-06-19 ASSESSMENT — ENCOUNTER SYMPTOMS
SLEEP DISTURBANCE: 0
SHORTNESS OF BREATH: 0
FREQUENCY: 1
UNEXPECTED WEIGHT CHANGE: 0
PALPITATIONS: 0
NUMBNESS: 0
ABDOMINAL PAIN: 0
DYSURIA: 0
FEVER: 0
WEAKNESS: 0
ARTHRALGIAS: 1
CHEST TIGHTNESS: 0
BLOOD IN STOOL: 0
RECTAL PAIN: 0
FLANK PAIN: 0
CONFUSION: 0
MYALGIAS: 1
LIGHT-HEADEDNESS: 0
FATIGUE: 0
HEMATURIA: 0
NAUSEA: 0
HEADACHES: 0
VOMITING: 0
COUGH: 0

## 2025-06-30 ENCOUNTER — HOSPITAL ENCOUNTER (OUTPATIENT)
Dept: RADIOLOGY | Facility: HOSPITAL | Age: 70
Discharge: HOME | End: 2025-06-30
Payer: MEDICARE

## 2025-06-30 DIAGNOSIS — N20.0 KIDNEY STONES: ICD-10-CM

## 2025-06-30 PROCEDURE — 76770 US EXAM ABDO BACK WALL COMP: CPT | Performed by: RADIOLOGY

## 2025-06-30 PROCEDURE — 76770 US EXAM ABDO BACK WALL COMP: CPT

## 2025-07-07 ENCOUNTER — APPOINTMENT (OUTPATIENT)
Dept: UROLOGY | Facility: CLINIC | Age: 70
End: 2025-07-07
Payer: MEDICARE

## 2025-07-07 ENCOUNTER — TELEPHONE (OUTPATIENT)
Dept: PRIMARY CARE | Facility: CLINIC | Age: 70
End: 2025-07-07

## 2025-07-07 VITALS
BODY MASS INDEX: 38.79 KG/M2 | SYSTOLIC BLOOD PRESSURE: 109 MMHG | DIASTOLIC BLOOD PRESSURE: 73 MMHG | HEART RATE: 75 BPM | WEIGHT: 198.63 LBS

## 2025-07-07 DIAGNOSIS — N20.0 KIDNEY STONES: Primary | ICD-10-CM

## 2025-07-07 DIAGNOSIS — L65.9 HAIR LOSS DISORDER: Primary | ICD-10-CM

## 2025-07-07 DIAGNOSIS — L65.9 HAIR LOSS: ICD-10-CM

## 2025-07-07 PROCEDURE — 99024 POSTOP FOLLOW-UP VISIT: CPT

## 2025-07-07 PROCEDURE — 3078F DIAST BP <80 MM HG: CPT

## 2025-07-07 PROCEDURE — 3074F SYST BP LT 130 MM HG: CPT

## 2025-07-07 NOTE — TELEPHONE ENCOUNTER
The patient is requesting a set of lab orders to test her vitamin levels.  She became concerned about recent hair loss and mentioned at a recent appointment to her urologist.  The urologist advised she request labs to be ordered by her pcp, as this is not something they would order as her specialty provider.    Please phone the patient once the orders have been placed in the system.

## 2025-07-07 NOTE — PROGRESS NOTES
Assessment/Plan   Problem List Items Addressed This Visit    None      Subjective   Patient ID: Beth Quinones is a 70 y.o. female who presents for No chief complaint on file..    Surgical History[1]   Family History[2]   Social History     Socioeconomic History    Marital status:      Spouse name: Not on file    Number of children: Not on file    Years of education: Not on file    Highest education level: Not on file   Occupational History    Not on file   Tobacco Use    Smoking status: Never     Passive exposure: Past    Smokeless tobacco: Never   Vaping Use    Vaping status: Never Used   Substance and Sexual Activity    Alcohol use: Never    Drug use: Never    Sexual activity: Yes     Comment: Hysterectomy   Other Topics Concern    Not on file   Social History Narrative    Not on file     Social Drivers of Health     Financial Resource Strain: Low Risk  (3/21/2025)    Overall Financial Resource Strain (CARDIA)     Difficulty of Paying Living Expenses: Not very hard   Food Insecurity: No Food Insecurity (3/18/2025)    Hunger Vital Sign     Worried About Running Out of Food in the Last Year: Never true     Ran Out of Food in the Last Year: Never true   Transportation Needs: No Transportation Needs (4/10/2025)    OASIS : Transportation     Lack of Transportation (Medical): No     Lack of Transportation (Non-Medical): No     Patient Unable or Declines to Respond: No   Physical Activity: Insufficiently Active (3/18/2025)    Exercise Vital Sign     Days of Exercise per Week: 2 days     Minutes of Exercise per Session: 10 min   Stress: Not on file   Social Connections: Feeling Socially Integrated (4/10/2025)    OASIS : Social Isolation     Frequency of experiencing loneliness or isolation: Never   Intimate Partner Violence: Not At Risk (3/18/2025)    Humiliation, Afraid, Rape, and Kick questionnaire     Fear of Current or Ex-Partner: No     Emotionally Abused: No     Physically Abused: No     Sexually  "Abused: No   Housing Stability: Low Risk  (3/21/2025)    Housing Stability Vital Sign     Unable to Pay for Housing in the Last Year: No     Number of Times Moved in the Last Year: 0     Homeless in the Last Year: No      Patient has no known allergies.   Current Rx[3]   There were no vitals filed for this visit.   Problem List Items Addressed This Visit    None     No orders of the defined types were placed in this encounter.       HPI    ROS    PHYSICAL EXAM      No results found for: \"PR1\", \"BMPR1A\", \"CMPLAS\", \"LI9RTKPA\", \"KPSAT\"   Lab Results   Component Value Date    CHOL 248 (H) 01/03/2024    LDLCALC 151 (H) 01/03/2024    CHHDL 3.6 01/03/2024     Lab Results   Component Value Date    TSH 2.04 01/03/2024              === 03/17/25 ===    CT ABDOMEN PELVIS W IV CONTRAST    - Impression -  1. 10 x 10 x 18 mm proximal left ureteral calculus with mild left  hydronephrosis.  2. Left renal calculi.    MACRO:  None    Signed by: Bebo Negrete 3/23/2025 3:11 PM  Dictation workstation:   [id number]    Results for orders placed during the hospital encounter of 03/17/25    Transthoracic Echo (TTE) Complete    Narrative  [place] [id number]  Tel [phone] Fax [phone]    TRANSTHORACIC ECHOCARDIOGRAM REPORT    Patient Name:       JENNIFER SANON      Reading Physician:    [id number] Aj Patino MD, [facility]  Study Date:         3/20/2025           Ordering Provider:    [id number] SORAIDA STERN  MRN/PID:            [id number]            Fellow:  Accession#:         [id number]        Nurse:  Date of Birth/Age:  1955 / 69 years Sonographer:          Patricia Betts  [facility]  Gender Assigned at  F                   Additional Staff:  Birth:  Height:             152.40 cm           Admit Date:           3/17/2025  Weight:             95.71 kg            Admission Status:     Inpatient -  Routine  BSA / BMI:          1.91 m2 / 41.21     Department Location:  [address]"                                    Echo Lab  Blood Pressure: 129 /64 mmHg    Study Type:    TRANSTHORACIC ECHO (TTE) COMPLETE  Diagnosis/ICD: Myocarditis, unspecified-I51.4  Indication:    Evaluate for Myocarditis  CPT Codes:     Echo Complete w Full [id number]    Patient History:  Pertinent History: HTN and Hyperlipidemia.    Study Detail: The following Echo studies were performed: 2D, M-Mode, Doppler and  color flow. Definity used as a contrast agent for endocardial  border definition. Total contrast used for this procedure was 2 mL  via IV push.      PHYSICIAN INTERPRETATION:  Left Ventricle: The left ventricular systolic function is low normal, with a visually estimated ejection fraction of 50-55%. There is mild concentric left ventricular hypertrophy. There are no regional wall motion abnormalities. The left ventricular cavity size is normal. There is mild increased septal and mildly increased posterior left ventricular wall thickness. Spectral Doppler shows a Grade I (impaired relaxation pattern) of left ventricular diastolic filling with normal left atrial filling pressure.  Left Atrium: The left atrial size is normal. Left atrial volume index 28.4 mL/m2.  Right Ventricle: The right ventricle is normal in size. There is normal right ventricular global systolic function.  Right Atrium: The right atrial size is normal.  Aortic Valve: The aortic valve is trileaflet. The aortic valve dimensionless index is 0.82. There is no evidence of aortic valve regurgitation. The peak instantaneous gradient of the aortic valve is 7 mmHg. The mean gradient of the aortic valve is 4 mmHg.  Mitral Valve: The mitral valve is normal in structure. There is no evidence of mitral valve regurgitation.  Tricuspid Valve: The tricuspid valve is structurally normal. No evidence of tricuspid regurgitation. Trivial tricuspid regurgitation with estimated RVSP 21 mmHg.  Pulmonic Valve: The pulmonic valve is structurally normal. There is no  indication of pulmonic valve regurgitation.  Pericardium: Trivial pericardial effusion. The effusion is circumferential.  Aorta: The aortic root is normal.  Systemic Veins: The inferior vena cava appears normal in size, with IVC inspiratory collapse greater than 50%.  In comparison to the previous echocardiogram(s): No previous available for comparison.      CONCLUSIONS:  1. The left ventricular systolic function is low normal, with a visually estimated ejection fraction of 50-55%.  2. No regional wall motion abnormalities.  3. Spectral Doppler shows a Grade I (impaired relaxation pattern) of left ventricular diastolic filling with normal left atrial filling pressure.  4. Trivial tricuspid regurgitation with estimated RVSP 21 mmHg.  5. No previous available for comparison.    QUANTITATIVE DATA SUMMARY:    2D MEASUREMENTS:             Normal Ranges:  Ao Root d:       3.31 cm     (2.0-3.7cm)  LAs:             4.30 cm     (2.7-4.0cm)  IVSd:            1.09 cm     (0.6-1.1cm)  LVPWd:           1.05 cm     (0.6-1.1cm)  LVIDd:           4.75 cm     (3.9-5.9cm)  LVIDs:           3.25 cm  LV Mass Index:   96.1 g/m2  LVEDV Index:     63.77 ml/m2  LV % FS          31.6 %      LEFT ATRIUM:                  Normal Ranges:  LA Vol A4C:        60.9 ml    (22+/-6mL/m2)  LA Vol A2C:        48.1 ml  LA Vol BP:         54.2 ml  LA Vol Index A4C:  31.9ml/m2  LA Vol Index A2C:  25.2 ml/m2  LA Vol Index BP:   28.4 ml/m2  LA Area A4C:       21.0 cm2  LA Area A2C:       18.6 cm2  LA Major Axis A4C: 6.2 cm  LA Major Axis A2C: 6.1 cm  LA Volume Index:   27.3 ml/m2      RIGHT ATRIUM:                 Normal Ranges:  RA Vol A4C:        40.5 ml    (8.3-19.5ml)  RA Vol Index A4C:  21.2 ml/m2  RA Area A4C:       16.9 cm2  RA Major Axis A4C: 6.0 cm      AORTA MEASUREMENTS:         Normal Ranges:  Asc Ao, d:          3.22 cm (2.1-3.4cm)      LV SYSTOLIC FUNCTION:  Normal Ranges:  EF-A4C View:    56 % (>=55%)  EF-A2C View:    55 %  EF-Biplane:      53 %  EF-Visual:      53 %  LV EF Reported: 53 %      LV DIASTOLIC FUNCTION:           Normal Ranges:  MV Peak E:             0.76 m/s  (0.7-1.2 m/s)  MV Peak A:             1.15 m/s  (0.42-0.7 m/s)  E/A Ratio:             0.66      (1.0-2.2)  MV e'                  0.085 m/s (>8.0)  MV lateral e'          0.10 m/s  MV medial e'           0.07 m/s  E/e' Ratio:            8.96      (<8.0)      MITRAL VALVE:          Normal Ranges:  MV DT:        170 msec (150-240msec)      AORTIC VALVE:                     Normal Ranges:  AoV Vmax:                1.28 m/s (<=1.7m/s)  AoV Peak P.6 mmHg (<20mmHg)  AoV Mean P.0 mmHg (1.7-11.5mmHg)  LVOT Max Dinh:            1.05 m/s (<=1.1m/s)  AoV VTI:                 24.90 cm (18-25cm)  LVOT VTI:                20.30 cm  LVOT Diameter:           2.18 cm  (1.8-2.4cm)  AoV Area, VTI:           3.04 cm2 (2.5-5.5cm2)  AoV Area,Vmax:           3.06 cm2 (2.5-4.5cm2)  AoV Dimensionless Index: 0.82      RIGHT VENTRICLE:  RV Basal 3.39 cm  RV Mid   2.69 cm  RV Major 7.4 cm  TAPSE:   17.8 mm  RV s'    0.15 m/s      TRICUSPID VALVE/RVSP:          Normal Ranges:  Peak TR Velocity:     2.13 m/s  RV Syst Pressure:     21 mmHg  (< 30mmHg)  IVC Diam:             1.58 cm      PULMONIC VALVE:          Normal Ranges:  PV Accel Time:  92 msec  (>120ms)  PV Max Dinh:     1.2 m/s  (0.6-0.9m/s)  PV Max P.4 mmHg      [id number] Aj Patino MD, FACC  Electronically signed on 3/20/2025 at 10:42:58 AM        ** Final **                        [1]   Past Surgical History:  Procedure Laterality Date    BREAST BIOPSY Right     CHOLECYSTECTOMY  2015    COLONOSCOPY      ESOPHAGOGASTRODUODENOSCOPY      HYSTERECTOMY  2015    MOUTH SURGERY      tooth removed   [2]   Family History  Problem Relation Name Age of Onset    Colon cancer Mother Moca     Osteoporosis Mother Moca     Thyroid disease Mother Moca     Heart disease Father Humphrey     Breast cancer  Maternal Grandmother Grandma Beth     Heart disease Brother Gustavo Sanon     Heart disease Brother Gustavo Sanon    [3]   Current Outpatient Medications   Medication Sig Dispense Refill    aspirin 81 mg EC tablet Take 1 tablet (81 mg) by mouth once daily.      atorvastatin (Lipitor) 40 mg tablet Take 1 tablet (40 mg) by mouth once daily. (Patient taking differently: Take 1 tablet (40 mg) by mouth once daily at bedtime.) 90 tablet 3    busPIRone (Buspar) 5 mg tablet Take 1 tablet (5 mg) by mouth once daily as needed (if needed nerves). 90 tablet 3    ergocalciferol (Vitamin D-2) 1.25 MG (16756 UT) capsule Take 1 capsule (1,250 mcg) by mouth 1 (one) time per week. (Patient not taking: Reported on 7/7/2025) 13 capsule 3    estradiol (Estrace) 0.01 % (0.1 mg/gram) vaginal cream Insert 0.25 Applicatorfuls (1 g) into the vagina 3 times a week. 34 g 5    ibuprofen 600 mg tablet Take 1 tablet (600 mg) by mouth 2 times daily (morning and late afternoon). 180 tablet 3    meclizine (Antivert) 12.5 mg tablet Take 1 tablet (12.5 mg) by mouth 3 times a day as needed for dizziness. 30 tablet 3    metoprolol succinate XL (Toprol-XL) 50 mg 24 hr tablet Take 1 tablet (50 mg) by mouth once daily. Do not crush or chew. 90 tablet 3     No current facility-administered medications for this visit.

## 2025-07-07 NOTE — PATIENT INSTRUCTIONS
KIDNEY STONE PREVENTION:  -Drink 80-90 ounces of fluids per day (mostly water).  -Add fresh lemon or lime to water.  -Urine should be light yellow and clear by lunchtime.  -Things to AVOID: iced tea (black or green tea in moderation), dark frederick, energy drinks, foods high in sodium (deli meats, canned goods, fast food), foods high in oxalates (dark leafy green, nuts, dark chocolate), high doses of vitamin C supplements, TUMS.

## 2025-07-07 NOTE — PROGRESS NOTES
Subjective   Patient ID: Beth Quinones is a 70 y.o. female who presents for Follow-up.    HPI  Patient presents with a PMH of kidney stones who was [place] to [facility] [place] [place]24[place] with sepsis and large obstructing kidney stone initially managed with a PCNT followed by a mini-PCNL on 05/07/25 with Dr. Hatfield.  Multiple stones were removed including a 1.5 cm stone and three 1 cm stones. Stone analysis revealed a mixture of calcium oxalate and calcium phosphate stones.  Patient's stent was removed on 05/19/25 without incident.  Renal US on 06/30 was normal wo stones or hydro.    Patient has been doing well.  No pain, dysuria, hematuria.  No frequency or incontinence.  Previously drank a lot of Pepsi, did not drink a lot of water.  She's been having some hair loss and is going to reach out to her PCP.  Denies hx of thyroid issues or known vitamin deficiency.    Pertinent  hx:  06/30/25 renal US - normal  05/19/25 s/p in-office stent removal  05/07/25 s/p mini PCNL with Dr. Hatfield  03/24/25 - [facility] [place] for obstructing calculus + sepsis    Review of Systems  See HPI.    Objective   Physical Exam  Vitals:    07/07/25 1029   BP: 109/73   Pulse: 75     Alert and oriented x3  No acute distress, cooperative  Breathes easily on room air  Normal range of motion  No focal neurological deficits  Appears stated age    Lab Results   Component Value Date    GLUCOSE 105 (H) 04/21/2025    CALCIUM 9.6 04/21/2025     04/21/2025    K 4.5 04/21/2025    CO2 25 04/21/2025     04/21/2025    BUN 16 04/21/2025    CREATININE 1.12 (H) 04/21/2025     === 06/30/25 ===    US RENAL COMPLETE    - Impression -  No hydronephrosis bilaterally.    No focal urinary bladder abnormality identified.    MACRO:  None.    Signed by: Roberto Burr 6/30/2025 5:52 PM  Dictation workstation:   [id number]    Assessment/Plan   Diagnoses and all orders for this visit:  Kidney stones  -     Follow Up In Urology; Future  -     US renal complete;  Future  Hair loss    Patient is doing well s/p mini PCNL with subsequent stent removal and renal US wo stones or hydro.  We discussed kidney stone prevention in detail.  She will work on increasing her fluids overall and avoiding dark frederick.  We'll get a renal US in 6 mo to check for new stone development.  As for her hair loss, I advised she reach out to her PCP.  May need to get TSH and vitamin levels checked.  Patient agrees with plan and all questions answered.    PLAN:  Diet modification for stone prevention  Renal US 6 mo  RTO 2 wks after imaging    Gisselle Ayala PA-C 07/07/25 10:42 AM

## 2025-07-13 LAB
ALBUMIN SERPL-MCNC: 4.3 G/DL (ref 3.6–5.1)
ALP SERPL-CCNC: 396 U/L (ref 37–153)
ALT SERPL-CCNC: 26 U/L (ref 6–29)
ANION GAP SERPL CALCULATED.4IONS-SCNC: 6 MMOL/L (CALC) (ref 7–17)
AST SERPL-CCNC: 28 U/L (ref 10–35)
BILIRUB SERPL-MCNC: 0.3 MG/DL (ref 0.2–1.2)
BUN SERPL-MCNC: 26 MG/DL (ref 7–25)
CALCIUM SERPL-MCNC: 9.3 MG/DL (ref 8.6–10.4)
CHLORIDE SERPL-SCNC: 108 MMOL/L (ref 98–110)
CO2 SERPL-SCNC: 28 MMOL/L (ref 20–32)
CREAT SERPL-MCNC: 1.12 MG/DL (ref 0.6–1)
EGFRCR SERPLBLD CKD-EPI 2021: 53 ML/MIN/1.73M2
GLUCOSE SERPL-MCNC: 106 MG/DL (ref 65–99)
POTASSIUM SERPL-SCNC: 4.6 MMOL/L (ref 3.5–5.3)
PROT SERPL-MCNC: 7.4 G/DL (ref 6.1–8.1)
SODIUM SERPL-SCNC: 142 MMOL/L (ref 135–146)
T3FREE SERPL-MCNC: 2.2 PG/ML (ref 2.3–4.2)
TSH SERPL-ACNC: 2.05 MIU/L (ref 0.4–4.5)

## 2025-09-15 ENCOUNTER — APPOINTMENT (OUTPATIENT)
Dept: PRIMARY CARE | Facility: CLINIC | Age: 70
End: 2025-09-15
Payer: MEDICARE

## 2026-01-05 ENCOUNTER — APPOINTMENT (OUTPATIENT)
Dept: UROLOGY | Facility: CLINIC | Age: 71
End: 2026-01-05
Payer: MEDICARE

## 2026-03-09 ENCOUNTER — APPOINTMENT (OUTPATIENT)
Dept: PRIMARY CARE | Facility: CLINIC | Age: 71
End: 2026-03-09
Payer: MEDICARE

## (undated) DEVICE — GOWN, SURGICAL, SMARTGOWN, XX-LARGE, STERILE

## (undated) DEVICE — TOWEL PACK, STERILE, 16X24, XRAY DETECTABLE, BLUE, 4/PK

## (undated) DEVICE — Device

## (undated) DEVICE — COVER HANDLE LIGHT, STERIS, BLUE, STERILE

## (undated) DEVICE — COUNTER, NEEDLE, FOAM BLOCK, W/MAGNET, 20 COUNT

## (undated) DEVICE — ADHESIVE, SKIN, DERMABOND ADVANCED, 15CM, PEN-STYLE

## (undated) DEVICE — SYRINGE, 10 CC, LUER LOCK

## (undated) DEVICE — BANDAGE, COHESIVE, HAND TEAR, COFLEX, 2 X 5 YDS, LF

## (undated) DEVICE — PADDING, CAST, SPECIALIST, 3 IN X 4 YD, STERILE

## (undated) DEVICE — SUTURE, ETHILON, 3-0, 18 IN, PS1, BLACK

## (undated) DEVICE — CRADLE, ARM, FOAM

## (undated) DEVICE — SOLUTION, IRRIGATION, STERILE WATER, 1000 ML, POUR BOTTLE

## (undated) DEVICE — SOLUTION, IRRIGATION, USP, SODIUM CHLORIDE 0.9%, 3000 ML, BAG

## (undated) DEVICE — GLOVE, SURGICAL, PROTEXIS PI , 7.5, PF, LF

## (undated) DEVICE — GOWN, SURGICAL, ROYAL SILK, XL, STERILE

## (undated) DEVICE — SOLUTION, IRRIGATION, SODIUM CHLORIDE 0.9%, 1000 ML, POUR BOTTLE

## (undated) DEVICE — GLOVE, SURGICAL, PROTEXIS PI BLUE W/NEUTHERA, 8.0, PF, LF

## (undated) DEVICE — DRAPE, SHEET, THREE QUARTER, FAN FOLD, 57 X 77 IN

## (undated) DEVICE — PREP TRAY, VAGINAL

## (undated) DEVICE — CATHETER, DUAL LUMEN, UDC/10/50 M

## (undated) DEVICE — COVER, PLASTIC, MAYO STAND, 29.5IN X 55.5IN

## (undated) DEVICE — APPLICATOR, CHLORAPREP, W/ORANGE TINT, 26ML

## (undated) DEVICE — SYRINGE, 20 CC, LUER LOCK, MONOJECT, W/O CAP, LF

## (undated) DEVICE — CONTAINER, SPECIMEN, 4 OZ, OR PEEL PACK, STERILE

## (undated) DEVICE — TOWEL PACK, STERILE, 4/PACK, BLUE

## (undated) DEVICE — DRAPE, C-ARM IMAGE

## (undated) DEVICE — GLOVE, SURGICAL, PROTEXIS PI , 7.0, PF, LF

## (undated) DEVICE — DRAPE, PCNL

## (undated) DEVICE — CATHETER, URETERAL, POLLACK, OPEN END, 5.5 FR, 70 CM

## (undated) DEVICE — Y-ADAPTER, GATEWAY ADVANTAGE

## (undated) DEVICE — STRAP, VELCRO, BODY, 4 X 60IN, NS

## (undated) DEVICE — SUTURE, VICRYL, 4-0, 27 IN, PS-2, UNDYED

## (undated) DEVICE — TUBING, SUCTION, CONNECTING, 9/32 X 10FT, LF

## (undated) DEVICE — GLOVE, SURGICAL, PROTEXIS PI MICRO, 8.0, PF, LF

## (undated) DEVICE — SUTURE, ETHILON, 4-0, BLK, MONO, PS-2 18

## (undated) DEVICE — SYRINGE, 60 CC, IRRIGATION, BULB, CONTRO-BULB, PAPER POUCH

## (undated) DEVICE — TRAY, SURESTEP, SILICONE DRAINAGE BAG, STATLOCK, 16FR

## (undated) DEVICE — MARKER, SKIN, DUAL TIP, W/RULER AND LABEL

## (undated) DEVICE — GUIDEWIRE, AMPLATZ, SUPER STIFF, 035 STRAIGHT

## (undated) DEVICE — GUIDEWIRE, DUAL SENSOR, .035 X 150 STRAIGHT,  3CM

## (undated) DEVICE — DRESSING, NON-ADHERENT, 3 X 3 IN, STERILE